# Patient Record
Sex: MALE | Race: WHITE | NOT HISPANIC OR LATINO | URBAN - METROPOLITAN AREA
[De-identification: names, ages, dates, MRNs, and addresses within clinical notes are randomized per-mention and may not be internally consistent; named-entity substitution may affect disease eponyms.]

---

## 2017-02-28 ENCOUNTER — EMERGENCY (EMERGENCY)
Facility: HOSPITAL | Age: 32
LOS: 1 days | Discharge: HOME | End: 2017-02-28
Attending: EMERGENCY MEDICINE
Payer: MEDICAID

## 2017-02-28 VITALS
DIASTOLIC BLOOD PRESSURE: 78 MMHG | TEMPERATURE: 98 F | WEIGHT: 139.99 LBS | OXYGEN SATURATION: 100 % | SYSTOLIC BLOOD PRESSURE: 151 MMHG | HEART RATE: 96 BPM | HEIGHT: 70 IN | RESPIRATION RATE: 22 BRPM

## 2017-02-28 DIAGNOSIS — R11.2 NAUSEA WITH VOMITING, UNSPECIFIED: ICD-10-CM

## 2017-02-28 DIAGNOSIS — F17.200 NICOTINE DEPENDENCE, UNSPECIFIED, UNCOMPLICATED: ICD-10-CM

## 2017-02-28 DIAGNOSIS — K29.20 ALCOHOLIC GASTRITIS WITHOUT BLEEDING: ICD-10-CM

## 2017-02-28 PROCEDURE — 99285 EMERGENCY DEPT VISIT HI MDM: CPT | Mod: 25

## 2017-02-28 NOTE — ED ADULT TRIAGE NOTE - CHIEF COMPLAINT QUOTE
Pt c/o vomiting , diarrhea x 3 hours. Pt has hx of pancreatitis and colitis.  Pt also reports sob.  On 12L NRB upon arrival.

## 2017-03-01 VITALS
RESPIRATION RATE: 16 BRPM | TEMPERATURE: 98 F | HEART RATE: 87 BPM | OXYGEN SATURATION: 98 % | DIASTOLIC BLOOD PRESSURE: 87 MMHG | SYSTOLIC BLOOD PRESSURE: 135 MMHG

## 2017-03-01 LAB
ALBUMIN SERPL ELPH-MCNC: 4.5 G/DL — SIGNIFICANT CHANGE UP (ref 3.3–5.2)
ALP SERPL-CCNC: 65 U/L — SIGNIFICANT CHANGE UP (ref 40–120)
ALT FLD-CCNC: 23 U/L — SIGNIFICANT CHANGE UP
ANION GAP SERPL CALC-SCNC: 14 MMOL/L — SIGNIFICANT CHANGE UP (ref 5–17)
AST SERPL-CCNC: 18 U/L — SIGNIFICANT CHANGE UP
BILIRUB SERPL-MCNC: 0.3 MG/DL — LOW (ref 0.4–2)
BUN SERPL-MCNC: 22 MG/DL — HIGH (ref 8–20)
CALCIUM SERPL-MCNC: 9.8 MG/DL — SIGNIFICANT CHANGE UP (ref 8.6–10.2)
CHLORIDE SERPL-SCNC: 101 MMOL/L — SIGNIFICANT CHANGE UP (ref 98–107)
CO2 SERPL-SCNC: 27 MMOL/L — SIGNIFICANT CHANGE UP (ref 22–29)
CREAT SERPL-MCNC: 1.02 MG/DL — SIGNIFICANT CHANGE UP (ref 0.5–1.3)
ETHANOL SERPL-MCNC: <10 MG/DL — SIGNIFICANT CHANGE UP
GLUCOSE SERPL-MCNC: 105 MG/DL — SIGNIFICANT CHANGE UP (ref 70–115)
HCT VFR BLD CALC: 42.2 % — SIGNIFICANT CHANGE UP (ref 42–52)
HGB BLD-MCNC: 15.3 G/DL — SIGNIFICANT CHANGE UP (ref 14–18)
LIDOCAIN IGE QN: 29 U/L — SIGNIFICANT CHANGE UP (ref 22–51)
MCHC RBC-ENTMCNC: 35.5 PG — HIGH (ref 27–31)
MCHC RBC-ENTMCNC: 36.3 G/DL — HIGH (ref 32–36)
MCV RBC AUTO: 97.9 FL — HIGH (ref 80–94)
PLATELET # BLD AUTO: 249 K/UL — SIGNIFICANT CHANGE UP (ref 150–400)
POTASSIUM SERPL-MCNC: 3.6 MMOL/L — SIGNIFICANT CHANGE UP (ref 3.5–5.3)
POTASSIUM SERPL-SCNC: 3.6 MMOL/L — SIGNIFICANT CHANGE UP (ref 3.5–5.3)
PROT SERPL-MCNC: 7.2 G/DL — SIGNIFICANT CHANGE UP (ref 6.6–8.7)
RBC # BLD: 4.31 M/UL — LOW (ref 4.6–6.2)
RBC # FLD: 12.1 % — SIGNIFICANT CHANGE UP (ref 11–15.6)
SODIUM SERPL-SCNC: 142 MMOL/L — SIGNIFICANT CHANGE UP (ref 135–145)
WBC # BLD: 7.2 K/UL — SIGNIFICANT CHANGE UP (ref 4.8–10.8)
WBC # FLD AUTO: 7.2 K/UL — SIGNIFICANT CHANGE UP (ref 4.8–10.8)

## 2017-03-01 PROCEDURE — 74177 CT ABD & PELVIS W/CONTRAST: CPT

## 2017-03-01 PROCEDURE — 74177 CT ABD & PELVIS W/CONTRAST: CPT | Mod: 26

## 2017-03-01 PROCEDURE — 99284 EMERGENCY DEPT VISIT MOD MDM: CPT | Mod: 25

## 2017-03-01 PROCEDURE — 85027 COMPLETE CBC AUTOMATED: CPT

## 2017-03-01 PROCEDURE — 96376 TX/PRO/DX INJ SAME DRUG ADON: CPT

## 2017-03-01 PROCEDURE — 80307 DRUG TEST PRSMV CHEM ANLYZR: CPT

## 2017-03-01 PROCEDURE — 80053 COMPREHEN METABOLIC PANEL: CPT

## 2017-03-01 PROCEDURE — 96375 TX/PRO/DX INJ NEW DRUG ADDON: CPT | Mod: XU

## 2017-03-01 PROCEDURE — 96374 THER/PROPH/DIAG INJ IV PUSH: CPT | Mod: XU

## 2017-03-01 PROCEDURE — 83690 ASSAY OF LIPASE: CPT

## 2017-03-01 RX ORDER — ONDANSETRON 8 MG/1
4 TABLET, FILM COATED ORAL ONCE
Qty: 0 | Refills: 0 | Status: COMPLETED | OUTPATIENT
Start: 2017-03-01 | End: 2017-03-01

## 2017-03-01 RX ORDER — SODIUM CHLORIDE 9 MG/ML
2000 INJECTION INTRAMUSCULAR; INTRAVENOUS; SUBCUTANEOUS ONCE
Qty: 0 | Refills: 0 | Status: COMPLETED | OUTPATIENT
Start: 2017-03-01 | End: 2017-03-01

## 2017-03-01 RX ORDER — DIPHENHYDRAMINE HCL 50 MG
25 CAPSULE ORAL ONCE
Qty: 0 | Refills: 0 | Status: COMPLETED | OUTPATIENT
Start: 2017-03-01 | End: 2017-03-01

## 2017-03-01 RX ORDER — HYDROMORPHONE HYDROCHLORIDE 2 MG/ML
1 INJECTION INTRAMUSCULAR; INTRAVENOUS; SUBCUTANEOUS ONCE
Qty: 0 | Refills: 0 | Status: DISCONTINUED | OUTPATIENT
Start: 2017-03-01 | End: 2017-03-01

## 2017-03-01 RX ORDER — FAMOTIDINE 10 MG/ML
20 INJECTION INTRAVENOUS ONCE
Qty: 0 | Refills: 0 | Status: COMPLETED | OUTPATIENT
Start: 2017-03-01 | End: 2017-03-01

## 2017-03-01 RX ORDER — KETOROLAC TROMETHAMINE 30 MG/ML
30 SYRINGE (ML) INJECTION ONCE
Qty: 0 | Refills: 0 | Status: DISCONTINUED | OUTPATIENT
Start: 2017-03-01 | End: 2017-03-01

## 2017-03-01 RX ORDER — METOCLOPRAMIDE HCL 10 MG
10 TABLET ORAL ONCE
Qty: 0 | Refills: 0 | Status: COMPLETED | OUTPATIENT
Start: 2017-03-01 | End: 2017-03-01

## 2017-03-01 RX ADMIN — Medication 30 MILLIGRAM(S): at 00:56

## 2017-03-01 RX ADMIN — SODIUM CHLORIDE 2000 MILLILITER(S): 9 INJECTION INTRAMUSCULAR; INTRAVENOUS; SUBCUTANEOUS at 00:41

## 2017-03-01 RX ADMIN — Medication 30 MILLIGRAM(S): at 00:42

## 2017-03-01 RX ADMIN — Medication 10 MILLIGRAM(S): at 00:54

## 2017-03-01 RX ADMIN — HYDROMORPHONE HYDROCHLORIDE 1 MILLIGRAM(S): 2 INJECTION INTRAMUSCULAR; INTRAVENOUS; SUBCUTANEOUS at 01:14

## 2017-03-01 RX ADMIN — HYDROMORPHONE HYDROCHLORIDE 1 MILLIGRAM(S): 2 INJECTION INTRAMUSCULAR; INTRAVENOUS; SUBCUTANEOUS at 00:56

## 2017-03-01 RX ADMIN — ONDANSETRON 4 MILLIGRAM(S): 8 TABLET, FILM COATED ORAL at 02:46

## 2017-03-01 RX ADMIN — HYDROMORPHONE HYDROCHLORIDE 1 MILLIGRAM(S): 2 INJECTION INTRAMUSCULAR; INTRAVENOUS; SUBCUTANEOUS at 03:11

## 2017-03-01 RX ADMIN — HYDROMORPHONE HYDROCHLORIDE 1 MILLIGRAM(S): 2 INJECTION INTRAMUSCULAR; INTRAVENOUS; SUBCUTANEOUS at 03:25

## 2017-03-01 RX ADMIN — HYDROMORPHONE HYDROCHLORIDE 1 MILLIGRAM(S): 2 INJECTION INTRAMUSCULAR; INTRAVENOUS; SUBCUTANEOUS at 00:41

## 2017-03-01 RX ADMIN — FAMOTIDINE 20 MILLIGRAM(S): 10 INJECTION INTRAVENOUS at 00:42

## 2017-03-01 RX ADMIN — Medication 25 MILLIGRAM(S): at 00:54

## 2017-03-01 RX ADMIN — HYDROMORPHONE HYDROCHLORIDE 1 MILLIGRAM(S): 2 INJECTION INTRAMUSCULAR; INTRAVENOUS; SUBCUTANEOUS at 01:30

## 2017-03-01 NOTE — ED PROVIDER NOTE - OBJECTIVE STATEMENT
32 y/o M pt with hx of pancreatitis and colitis presents to ED c/o abdominal pain, nausea, vomiting and diarrhea starting 2 days ago. Pt has recent endoscopy, with normal findings. last episode was in September.  Pt filled prescription for Zofran and Dilaudid last year. Smoker and THC use. Denies chest pain, SOB. No headache, dizziness, blurry vision. No blood in stool. No further complaints at this time.   GI: Jing

## 2017-03-01 NOTE — ED PROVIDER NOTE - MEDICAL DECISION MAKING DETAILS
labs, IV fluids, CT scan, Zofran, Dilaudid, Pepcid. no obvious signs of drug abuse. and re-evaluate pt

## 2017-03-01 NOTE — ED PROVIDER NOTE - NS ED MD SCRIBE ATTENDING SCRIBE SECTIONS
HISTORY OF PRESENT ILLNESS/VITAL SIGNS( Pullset)/PHYSICAL EXAM/HIV/REVIEW OF SYSTEMS/DISPOSITION/PAST MEDICAL/SURGICAL/SOCIAL HISTORY

## 2017-03-01 NOTE — ED ADULT NURSE NOTE - OBJECTIVE STATEMENT
Patient presents to ED with reports of abdominal pain, na Patient presents to ED with reports of abdominal pain, nausea and vomiting nonstop for 3 hours. As per patient he has a history of pancreatitis and colitis. Patient states that he was just having dinner when he suddenly felt above mentioned symptoms. Patient states because of his colitis, he has episodes of diarrhea. Patient added he has experienced SOB. He can't tolerate pain any longer and presents to bedside tearful.

## 2017-03-01 NOTE — ED PROVIDER NOTE - PROGRESS NOTE DETAILS
pt's labs and CT reviewed  no evidence of emergent pathology  possible alcoholic gastritis  will d/c after one more round of pain meds - follow up with GI and pmd  advised to stop ETOH

## 2017-07-22 ENCOUNTER — EMERGENCY (EMERGENCY)
Facility: HOSPITAL | Age: 32
LOS: 1 days | Discharge: HOME | End: 2017-07-22
Attending: EMERGENCY MEDICINE | Admitting: EMERGENCY MEDICINE
Payer: SELF-PAY

## 2017-07-22 VITALS
SYSTOLIC BLOOD PRESSURE: 124 MMHG | OXYGEN SATURATION: 99 % | HEART RATE: 78 BPM | RESPIRATION RATE: 18 BRPM | DIASTOLIC BLOOD PRESSURE: 78 MMHG | TEMPERATURE: 98 F

## 2017-07-22 VITALS
SYSTOLIC BLOOD PRESSURE: 121 MMHG | RESPIRATION RATE: 18 BRPM | HEART RATE: 89 BPM | WEIGHT: 134.92 LBS | OXYGEN SATURATION: 99 % | TEMPERATURE: 99 F | DIASTOLIC BLOOD PRESSURE: 70 MMHG | HEIGHT: 70 IN

## 2017-07-22 LAB
ALBUMIN SERPL ELPH-MCNC: 5 G/DL — SIGNIFICANT CHANGE UP (ref 3.3–5.2)
ALP SERPL-CCNC: 72 U/L — SIGNIFICANT CHANGE UP (ref 40–120)
ALT FLD-CCNC: 24 U/L — SIGNIFICANT CHANGE UP
ANION GAP SERPL CALC-SCNC: 15 MMOL/L — SIGNIFICANT CHANGE UP (ref 5–17)
APPEARANCE UR: CLEAR — SIGNIFICANT CHANGE UP
AST SERPL-CCNC: 21 U/L — SIGNIFICANT CHANGE UP
BACTERIA # UR AUTO: NEGATIVE — SIGNIFICANT CHANGE UP
BASOPHILS NFR BLD AUTO: 1 % — SIGNIFICANT CHANGE UP (ref 0–2)
BILIRUB SERPL-MCNC: 0.3 MG/DL — LOW (ref 0.4–2)
BILIRUB UR-MCNC: NEGATIVE — SIGNIFICANT CHANGE UP
BLD GP AB SCN SERPL QL: SIGNIFICANT CHANGE UP
BUN SERPL-MCNC: 15 MG/DL — SIGNIFICANT CHANGE UP (ref 8–20)
CALCIUM SERPL-MCNC: 9.3 MG/DL — SIGNIFICANT CHANGE UP (ref 8.6–10.2)
CHLORIDE SERPL-SCNC: 104 MMOL/L — SIGNIFICANT CHANGE UP (ref 98–107)
CO2 SERPL-SCNC: 23 MMOL/L — SIGNIFICANT CHANGE UP (ref 22–29)
COLOR SPEC: YELLOW — SIGNIFICANT CHANGE UP
CREAT SERPL-MCNC: 0.6 MG/DL — SIGNIFICANT CHANGE UP (ref 0.5–1.3)
DIFF PNL FLD: NEGATIVE — SIGNIFICANT CHANGE UP
EPI CELLS # UR: SIGNIFICANT CHANGE UP
GLUCOSE SERPL-MCNC: 116 MG/DL — HIGH (ref 70–115)
GLUCOSE UR QL: NEGATIVE MG/DL — SIGNIFICANT CHANGE UP
HCT VFR BLD CALC: 41.1 % — LOW (ref 42–52)
HGB BLD-MCNC: 14.7 G/DL — SIGNIFICANT CHANGE UP (ref 14–18)
KETONES UR-MCNC: ABNORMAL
LEUKOCYTE ESTERASE UR-ACNC: NEGATIVE — SIGNIFICANT CHANGE UP
LIDOCAIN IGE QN: 65 U/L — HIGH (ref 22–51)
LYMPHOCYTES # BLD AUTO: 7 % — LOW (ref 20–55)
MCHC RBC-ENTMCNC: 34.4 PG — HIGH (ref 27–31)
MCHC RBC-ENTMCNC: 35.8 G/DL — SIGNIFICANT CHANGE UP (ref 32–36)
MCV RBC AUTO: 96.3 FL — HIGH (ref 80–94)
NEUTROPHILS NFR BLD AUTO: 91 % — HIGH (ref 37–73)
NITRITE UR-MCNC: NEGATIVE — SIGNIFICANT CHANGE UP
PH UR: 6.5 — SIGNIFICANT CHANGE UP (ref 5–8)
PLAT MORPH BLD: NORMAL — SIGNIFICANT CHANGE UP
PLATELET # BLD AUTO: 227 K/UL — SIGNIFICANT CHANGE UP (ref 150–400)
POTASSIUM SERPL-MCNC: 3.9 MMOL/L — SIGNIFICANT CHANGE UP (ref 3.5–5.3)
POTASSIUM SERPL-SCNC: 3.9 MMOL/L — SIGNIFICANT CHANGE UP (ref 3.5–5.3)
PROT SERPL-MCNC: 7.1 G/DL — SIGNIFICANT CHANGE UP (ref 6.6–8.7)
PROT UR-MCNC: NEGATIVE MG/DL — SIGNIFICANT CHANGE UP
RBC # BLD: 4.27 M/UL — LOW (ref 4.6–6.2)
RBC # FLD: 12.5 % — SIGNIFICANT CHANGE UP (ref 11–15.6)
RBC BLD AUTO: NORMAL — SIGNIFICANT CHANGE UP
RBC CASTS # UR COMP ASSIST: SIGNIFICANT CHANGE UP /HPF (ref 0–4)
SODIUM SERPL-SCNC: 142 MMOL/L — SIGNIFICANT CHANGE UP (ref 135–145)
SP GR SPEC: 1.01 — SIGNIFICANT CHANGE UP (ref 1.01–1.02)
TYPE + AB SCN PNL BLD: SIGNIFICANT CHANGE UP
UROBILINOGEN FLD QL: NEGATIVE MG/DL — SIGNIFICANT CHANGE UP
VARIANT LYMPHS # BLD: 1 % — SIGNIFICANT CHANGE UP (ref 0–6)
WBC # BLD: 8 K/UL — SIGNIFICANT CHANGE UP (ref 4.8–10.8)
WBC # FLD AUTO: 8 K/UL — SIGNIFICANT CHANGE UP (ref 4.8–10.8)
WBC UR QL: SIGNIFICANT CHANGE UP

## 2017-07-22 PROCEDURE — 86850 RBC ANTIBODY SCREEN: CPT

## 2017-07-22 PROCEDURE — 96375 TX/PRO/DX INJ NEW DRUG ADDON: CPT

## 2017-07-22 PROCEDURE — 99284 EMERGENCY DEPT VISIT MOD MDM: CPT | Mod: 25

## 2017-07-22 PROCEDURE — 99284 EMERGENCY DEPT VISIT MOD MDM: CPT

## 2017-07-22 PROCEDURE — 81001 URINALYSIS AUTO W/SCOPE: CPT

## 2017-07-22 PROCEDURE — 74177 CT ABD & PELVIS W/CONTRAST: CPT

## 2017-07-22 PROCEDURE — 96374 THER/PROPH/DIAG INJ IV PUSH: CPT | Mod: XU

## 2017-07-22 PROCEDURE — 80053 COMPREHEN METABOLIC PANEL: CPT

## 2017-07-22 PROCEDURE — 85027 COMPLETE CBC AUTOMATED: CPT

## 2017-07-22 PROCEDURE — 86901 BLOOD TYPING SEROLOGIC RH(D): CPT

## 2017-07-22 PROCEDURE — 83690 ASSAY OF LIPASE: CPT

## 2017-07-22 PROCEDURE — 36415 COLL VENOUS BLD VENIPUNCTURE: CPT

## 2017-07-22 PROCEDURE — 86900 BLOOD TYPING SEROLOGIC ABO: CPT

## 2017-07-22 PROCEDURE — 74177 CT ABD & PELVIS W/CONTRAST: CPT | Mod: 26

## 2017-07-22 RX ORDER — SODIUM CHLORIDE 9 MG/ML
3 INJECTION INTRAMUSCULAR; INTRAVENOUS; SUBCUTANEOUS ONCE
Qty: 0 | Refills: 0 | Status: COMPLETED | OUTPATIENT
Start: 2017-07-22 | End: 2017-07-22

## 2017-07-22 RX ORDER — ONDANSETRON 8 MG/1
8 TABLET, FILM COATED ORAL ONCE
Qty: 0 | Refills: 0 | Status: COMPLETED | OUTPATIENT
Start: 2017-07-22 | End: 2017-07-22

## 2017-07-22 RX ORDER — MORPHINE SULFATE 50 MG/1
4 CAPSULE, EXTENDED RELEASE ORAL ONCE
Qty: 0 | Refills: 0 | Status: DISCONTINUED | OUTPATIENT
Start: 2017-07-22 | End: 2017-07-22

## 2017-07-22 RX ADMIN — SODIUM CHLORIDE 3 MILLILITER(S): 9 INJECTION INTRAMUSCULAR; INTRAVENOUS; SUBCUTANEOUS at 12:34

## 2017-07-22 RX ADMIN — ONDANSETRON 8 MILLIGRAM(S): 8 TABLET, FILM COATED ORAL at 12:56

## 2017-07-22 RX ADMIN — MORPHINE SULFATE 4 MILLIGRAM(S): 50 CAPSULE, EXTENDED RELEASE ORAL at 12:56

## 2017-07-22 RX ADMIN — MORPHINE SULFATE 4 MILLIGRAM(S): 50 CAPSULE, EXTENDED RELEASE ORAL at 13:11

## 2017-07-22 NOTE — ED ADULT NURSE NOTE - OBJECTIVE STATEMENT
pt BIBA for the ED for complaining of left sided abdominal pain with radiation to back.  pt is A/Ox3.

## 2017-07-22 NOTE — ED PROVIDER NOTE - MEDICAL DECISION MAKING DETAILS
The patient has history of ulcer and had alcohol last night.  Lab/CT negative.  The patient appears well and feels better.  Will DC home and f/u with PMD

## 2017-07-22 NOTE — ED PROVIDER NOTE - PROGRESS NOTE DETAILS
Patient given Zofran 8mg and Morphine 4mg and has improved symptomatically. Now is laying more comfortably in bed, only slightly tender to palpation on LLQ but still has L flank tenderness The patient appears well and feels better. Lab/CT negative and will DC home and f/u with PMD

## 2017-07-22 NOTE — ED PROVIDER NOTE - OBJECTIVE STATEMENT
Patient is a 33 y/o M with PMH of alcoholic pancreatitis, gastric ulcers and colitis how presents with multiple episodes of vomiting which started at 2am last night. He had 2 beers and coffee last night. He states he has vomited every 15 mins since they started and is experiencing L sided abdominal pain. The vomit started out yellow in color and in large amounts then small volumes with small specs of red but no carli red blood. He states the pain was 10/10, sharp and colicky, radiates to his L groin and back and only relieved by pain meds and keeping his hips flexed reduces the pain. He states he takes ibuprofen everyday even though he was told by his GI not to. He states he has chronic diarrhea with dark black loose stools daily. He endorses painful and concentrated urination and fatigue. He denies any CP, SOB, H/A, or fever. Patient is a 31 y/o M with PMH of alcoholic pancreatitis, gastric ulcers and colitis presents with multiple episodes of vomiting which started at 2am last night. He had 2 beers and coffee last night. He states he has vomited every 15 mins since they started and is experiencing L sided abdominal pain. The vomit started out yellow in color and in large amounts then small volumes with small specs of red but no carli red blood. He states the pain was 10/10, sharp and colicky, radiates to his L groin and back and only relieved by pain meds and keeping his hips flexed reduces the pain. He states he takes ibuprofen everyday even though he was told by his GI not to. He states he has chronic diarrhea with dark black loose stools daily. He endorses painful and concentrated urination and fatigue. He denies any CP, SOB, H/A, or fever.

## 2018-03-05 ENCOUNTER — EMERGENCY (EMERGENCY)
Facility: HOSPITAL | Age: 33
LOS: 1 days | End: 2018-03-05
Payer: MEDICAID

## 2018-03-05 PROCEDURE — 99284 EMERGENCY DEPT VISIT MOD MDM: CPT

## 2018-08-08 NOTE — ED PROVIDER NOTE - CROS ED RESP ALL NEG
Problem: Risk for Impaired Skin Integrity  Goal: Tissue integrity - skin and mucous membranes  Structural intactness and normal physiological function of skin and  mucous membranes.      Intervention: PRESSURE ULCER PREVENTION  Pt turned to side frequently using pillow wedge, pillow under heels/elbows negative...

## 2019-02-06 ENCOUNTER — EMERGENCY (EMERGENCY)
Facility: HOSPITAL | Age: 34
LOS: 1 days | Discharge: HOME | End: 2019-02-06
Attending: EMERGENCY MEDICINE
Payer: MEDICAID

## 2019-02-06 VITALS
TEMPERATURE: 98 F | SYSTOLIC BLOOD PRESSURE: 126 MMHG | RESPIRATION RATE: 16 BRPM | WEIGHT: 175.05 LBS | OXYGEN SATURATION: 98 % | HEART RATE: 109 BPM | HEIGHT: 71 IN | DIASTOLIC BLOOD PRESSURE: 80 MMHG

## 2019-02-06 VITALS — HEART RATE: 88 BPM

## 2019-02-06 PROBLEM — K25.9 GASTRIC ULCER, UNSPECIFIED AS ACUTE OR CHRONIC, WITHOUT HEMORRHAGE OR PERFORATION: Chronic | Status: ACTIVE | Noted: 2017-07-22

## 2019-02-06 PROBLEM — K52.9 NONINFECTIVE GASTROENTERITIS AND COLITIS, UNSPECIFIED: Chronic | Status: ACTIVE | Noted: 2017-03-01

## 2019-02-06 PROBLEM — K29.70 GASTRITIS, UNSPECIFIED, WITHOUT BLEEDING: Chronic | Status: ACTIVE | Noted: 2017-07-22

## 2019-02-06 LAB
ALBUMIN SERPL ELPH-MCNC: 4.6 G/DL — SIGNIFICANT CHANGE UP (ref 3.3–5.2)
ALP SERPL-CCNC: 97 U/L — SIGNIFICANT CHANGE UP (ref 40–120)
ALT FLD-CCNC: 39 U/L — SIGNIFICANT CHANGE UP
ANION GAP SERPL CALC-SCNC: 11 MMOL/L — SIGNIFICANT CHANGE UP (ref 5–17)
APPEARANCE UR: CLEAR — SIGNIFICANT CHANGE UP
AST SERPL-CCNC: 28 U/L — SIGNIFICANT CHANGE UP
BILIRUB SERPL-MCNC: 0.6 MG/DL — SIGNIFICANT CHANGE UP (ref 0.4–2)
BILIRUB UR-MCNC: NEGATIVE — SIGNIFICANT CHANGE UP
BUN SERPL-MCNC: 22 MG/DL — HIGH (ref 8–20)
CALCIUM SERPL-MCNC: 9.4 MG/DL — SIGNIFICANT CHANGE UP (ref 8.6–10.2)
CHLORIDE SERPL-SCNC: 102 MMOL/L — SIGNIFICANT CHANGE UP (ref 98–107)
CO2 SERPL-SCNC: 28 MMOL/L — SIGNIFICANT CHANGE UP (ref 22–29)
COLOR SPEC: YELLOW — SIGNIFICANT CHANGE UP
CREAT SERPL-MCNC: 0.8 MG/DL — SIGNIFICANT CHANGE UP (ref 0.5–1.3)
DIFF PNL FLD: NEGATIVE — SIGNIFICANT CHANGE UP
EOSINOPHIL NFR BLD AUTO: 1 % — SIGNIFICANT CHANGE UP (ref 0–6)
GLUCOSE SERPL-MCNC: 107 MG/DL — SIGNIFICANT CHANGE UP (ref 70–115)
GLUCOSE UR QL: NEGATIVE MG/DL — SIGNIFICANT CHANGE UP
HCT VFR BLD CALC: 45.5 % — SIGNIFICANT CHANGE UP (ref 42–52)
HGB BLD-MCNC: 16.2 G/DL — SIGNIFICANT CHANGE UP (ref 14–18)
KETONES UR-MCNC: NEGATIVE — SIGNIFICANT CHANGE UP
LEUKOCYTE ESTERASE UR-ACNC: NEGATIVE — SIGNIFICANT CHANGE UP
LIDOCAIN IGE QN: 28 U/L — SIGNIFICANT CHANGE UP (ref 22–51)
LYMPHOCYTES # BLD AUTO: 4 % — LOW (ref 20–55)
MCHC RBC-ENTMCNC: 34.2 PG — HIGH (ref 27–31)
MCHC RBC-ENTMCNC: 35.6 G/DL — SIGNIFICANT CHANGE UP (ref 32–36)
MCV RBC AUTO: 96 FL — HIGH (ref 80–94)
MONOCYTES NFR BLD AUTO: 2 % — LOW (ref 3–10)
NEUTROPHILS NFR BLD AUTO: 90 % — HIGH (ref 37–73)
NEUTS BAND # BLD: 3 % — SIGNIFICANT CHANGE UP (ref 0–8)
NITRITE UR-MCNC: NEGATIVE — SIGNIFICANT CHANGE UP
PH UR: 6 — SIGNIFICANT CHANGE UP (ref 5–8)
PLAT MORPH BLD: NORMAL — SIGNIFICANT CHANGE UP
PLATELET # BLD AUTO: 201 K/UL — SIGNIFICANT CHANGE UP (ref 150–400)
POTASSIUM SERPL-MCNC: 4.3 MMOL/L — SIGNIFICANT CHANGE UP (ref 3.5–5.3)
POTASSIUM SERPL-SCNC: 4.3 MMOL/L — SIGNIFICANT CHANGE UP (ref 3.5–5.3)
PROT SERPL-MCNC: 7.3 G/DL — SIGNIFICANT CHANGE UP (ref 6.6–8.7)
PROT UR-MCNC: NEGATIVE MG/DL — SIGNIFICANT CHANGE UP
RBC # BLD: 4.74 M/UL — SIGNIFICANT CHANGE UP (ref 4.6–6.2)
RBC # FLD: 12.5 % — SIGNIFICANT CHANGE UP (ref 11–15.6)
RBC BLD AUTO: NORMAL — SIGNIFICANT CHANGE UP
SODIUM SERPL-SCNC: 141 MMOL/L — SIGNIFICANT CHANGE UP (ref 135–145)
SP GR SPEC: 1.01 — SIGNIFICANT CHANGE UP (ref 1.01–1.02)
UROBILINOGEN FLD QL: NEGATIVE MG/DL — SIGNIFICANT CHANGE UP
WBC # BLD: 12.1 K/UL — HIGH (ref 4.8–10.8)
WBC # FLD AUTO: 12.1 K/UL — HIGH (ref 4.8–10.8)

## 2019-02-06 PROCEDURE — 96375 TX/PRO/DX INJ NEW DRUG ADDON: CPT

## 2019-02-06 PROCEDURE — 81003 URINALYSIS AUTO W/O SCOPE: CPT

## 2019-02-06 PROCEDURE — 96374 THER/PROPH/DIAG INJ IV PUSH: CPT

## 2019-02-06 PROCEDURE — 85027 COMPLETE CBC AUTOMATED: CPT

## 2019-02-06 PROCEDURE — 99284 EMERGENCY DEPT VISIT MOD MDM: CPT | Mod: 25

## 2019-02-06 PROCEDURE — 96361 HYDRATE IV INFUSION ADD-ON: CPT

## 2019-02-06 PROCEDURE — 36415 COLL VENOUS BLD VENIPUNCTURE: CPT

## 2019-02-06 PROCEDURE — 83690 ASSAY OF LIPASE: CPT

## 2019-02-06 PROCEDURE — 99284 EMERGENCY DEPT VISIT MOD MDM: CPT

## 2019-02-06 PROCEDURE — 80053 COMPREHEN METABOLIC PANEL: CPT

## 2019-02-06 RX ORDER — KETOROLAC TROMETHAMINE 30 MG/ML
15 SYRINGE (ML) INJECTION ONCE
Qty: 0 | Refills: 0 | Status: DISCONTINUED | OUTPATIENT
Start: 2019-02-06 | End: 2019-02-06

## 2019-02-06 RX ORDER — ONDANSETRON 8 MG/1
4 TABLET, FILM COATED ORAL ONCE
Qty: 0 | Refills: 0 | Status: COMPLETED | OUTPATIENT
Start: 2019-02-06 | End: 2019-02-06

## 2019-02-06 RX ORDER — METOCLOPRAMIDE HCL 10 MG
10 TABLET ORAL ONCE
Qty: 0 | Refills: 0 | Status: COMPLETED | OUTPATIENT
Start: 2019-02-06 | End: 2019-02-06

## 2019-02-06 RX ORDER — SODIUM CHLORIDE 9 MG/ML
3 INJECTION INTRAMUSCULAR; INTRAVENOUS; SUBCUTANEOUS ONCE
Qty: 0 | Refills: 0 | Status: COMPLETED | OUTPATIENT
Start: 2019-02-06 | End: 2019-02-06

## 2019-02-06 RX ORDER — FAMOTIDINE 10 MG/ML
20 INJECTION INTRAVENOUS ONCE
Qty: 0 | Refills: 0 | Status: COMPLETED | OUTPATIENT
Start: 2019-02-06 | End: 2019-02-06

## 2019-02-06 RX ORDER — METOCLOPRAMIDE HCL 10 MG
1 TABLET ORAL
Qty: 15 | Refills: 0 | OUTPATIENT
Start: 2019-02-06 | End: 2019-02-10

## 2019-02-06 RX ORDER — SODIUM CHLORIDE 9 MG/ML
1000 INJECTION INTRAMUSCULAR; INTRAVENOUS; SUBCUTANEOUS ONCE
Qty: 0 | Refills: 0 | Status: COMPLETED | OUTPATIENT
Start: 2019-02-06 | End: 2019-02-06

## 2019-02-06 RX ADMIN — SODIUM CHLORIDE 3 MILLILITER(S): 9 INJECTION INTRAMUSCULAR; INTRAVENOUS; SUBCUTANEOUS at 10:44

## 2019-02-06 RX ADMIN — SODIUM CHLORIDE 1000 MILLILITER(S): 9 INJECTION INTRAMUSCULAR; INTRAVENOUS; SUBCUTANEOUS at 10:44

## 2019-02-06 RX ADMIN — SODIUM CHLORIDE 1000 MILLILITER(S): 9 INJECTION INTRAMUSCULAR; INTRAVENOUS; SUBCUTANEOUS at 13:07

## 2019-02-06 RX ADMIN — ONDANSETRON 4 MILLIGRAM(S): 8 TABLET, FILM COATED ORAL at 10:44

## 2019-02-06 RX ADMIN — Medication 10 MILLIGRAM(S): at 13:07

## 2019-02-06 RX ADMIN — Medication 15 MILLIGRAM(S): at 13:07

## 2019-02-06 RX ADMIN — Medication 15 MILLIGRAM(S): at 10:54

## 2019-02-06 RX ADMIN — FAMOTIDINE 20 MILLIGRAM(S): 10 INJECTION INTRAVENOUS at 10:54

## 2019-02-06 NOTE — ED STATDOCS - PMH
Colitis    Colitis    Gastritis    Pancreatitis    Pancreatitis    Stomach ulcer Colitis    Gastritis    Pancreatitis    Stomach ulcer

## 2019-02-06 NOTE — ED ADULT NURSE NOTE - OBJECTIVE STATEMENT
Patient care assumed 1025. Patient received AOx3 sitting comfortably in bed with girlfriend at bedside. No distress noted at this time. Patient states abdominal pain, N/V since 0500 this AM. Patient states he has hx of pancreatitis, but thinks "this might be from bad food I ate last night." pt educated on plan of care, pt able to successfully teach back plan of care to RN, RN will continue to reeducate pt during hospital stay.

## 2019-02-06 NOTE — ED STATDOCS - PSH
No significant past surgical history    No significant past surgical history No significant past surgical history

## 2019-02-06 NOTE — ED STATDOCS - PROGRESS NOTE DETAILS
NP NOTE:  HPI, ROS, PE of intake doctor reviewed.  Abd exam + bs x 4, soft, nttp.  No vomiting while in ED, tolerating po.  Labs reviewed.  Will rx reglan as patient states ondansetron is too expensive and he cannot get it.  Refer to GI.

## 2019-02-06 NOTE — ED STATDOCS - ATTENDING CONTRIBUTION TO CARE
I, Karl Gilbert, performed the initial face to face bedside interview with this patient regarding history of present illness, review of symptoms and relevant past medical, social and family history.  I completed an independent physical examination.  I was the initial provider who evaluated this patient. I have signed out the follow up of any pending tests (i.e. labs, radiological studies) to the ACP.  I have communicated the patient’s plan of care and disposition with the ACP.

## 2019-02-06 NOTE — ED STATDOCS - MEDICAL DECISION MAKING DETAILS
Patient with prior history of alcohol pancreatiti having epigastric and LUQ pain since this morning. Will check blood and UA; will give IV fluid for hydration, Toradol for pain, Pepcid, and Zofran for nausea Patient with prior history of alcohol pancreatitis having epigastric and LUQ pain since this morning. Will check blood and UA; will give IV fluid for hydration, Toradol for pain, Pepcid, and Zofran for nausea

## 2019-02-06 NOTE — ED ADULT TRIAGE NOTE - CHIEF COMPLAINT QUOTE
Pt ambulatory in ED c/o abdominal pain and vomiting since approx 0500 this morning. Reports hx of pancreatitis and states " I may have eaten something that didn't agree with me."

## 2019-02-06 NOTE — ED STATDOCS - PHYSICAL EXAMINATION
epiga and luq pain  uncomfortable looking  lungs heart fine  diffuse erythema of skin (pt states he had recent sunburn_

## 2019-02-06 NOTE — ED STATDOCS - OBJECTIVE STATEMENT
34 y/o M with PMHx of pancreatitis presents to the ED c/o abdominal pain and vomiting since 0500 this morning. Patient reports associated symptoms of diaphoresis and feeling cold; he states regurgitating this morning but no food is excreted. Patient notes that symptoms are similar to pancreatitis symptoms in the past. He states that he has been clear of EtOH and tobacco use for 1 year. No further complaints at this time.

## 2019-06-22 NOTE — ED PROVIDER NOTE - ATTENDING CONTRIBUTION TO CARE
No I, Polo Donaldson, performed the initial face to face bedside interview with this patient regarding history of present illness, review of symptoms and relevant past medical, social and family history.  I completed an independent physical examination.  I was the initial provider who evaluated this patient. I have signed out the follow up of any pending tests (i.e. labs, radiological studies) to the resident.  I have communicated the patient’s plan of care and disposition with the resident.

## 2019-07-27 ENCOUNTER — EMERGENCY (EMERGENCY)
Facility: HOSPITAL | Age: 34
LOS: 1 days | Discharge: HOME | End: 2019-07-27
Attending: EMERGENCY MEDICINE
Payer: MEDICAID

## 2019-07-27 VITALS
SYSTOLIC BLOOD PRESSURE: 148 MMHG | DIASTOLIC BLOOD PRESSURE: 75 MMHG | HEART RATE: 68 BPM | TEMPERATURE: 98 F | WEIGHT: 145.06 LBS | HEIGHT: 70 IN | RESPIRATION RATE: 18 BRPM | OXYGEN SATURATION: 99 %

## 2019-07-27 VITALS
RESPIRATION RATE: 18 BRPM | DIASTOLIC BLOOD PRESSURE: 61 MMHG | SYSTOLIC BLOOD PRESSURE: 104 MMHG | TEMPERATURE: 98 F | OXYGEN SATURATION: 97 % | HEART RATE: 51 BPM

## 2019-07-27 LAB
ALBUMIN SERPL ELPH-MCNC: 4.9 G/DL — SIGNIFICANT CHANGE UP (ref 3.3–5.2)
ALP SERPL-CCNC: 87 U/L — SIGNIFICANT CHANGE UP (ref 40–120)
ALT FLD-CCNC: 16 U/L — SIGNIFICANT CHANGE UP
ANION GAP SERPL CALC-SCNC: 12 MMOL/L — SIGNIFICANT CHANGE UP (ref 5–17)
APTT BLD: 28.6 SEC — SIGNIFICANT CHANGE UP (ref 27.5–36.3)
AST SERPL-CCNC: 18 U/L — SIGNIFICANT CHANGE UP
BASE EXCESS BLDV CALC-SCNC: 2.1 MMOL/L — HIGH (ref -2–2)
BASOPHILS # BLD AUTO: 0.06 K/UL — SIGNIFICANT CHANGE UP (ref 0–0.2)
BASOPHILS NFR BLD AUTO: 0.4 % — SIGNIFICANT CHANGE UP (ref 0–2)
BILIRUB SERPL-MCNC: 0.2 MG/DL — LOW (ref 0.4–2)
BUN SERPL-MCNC: 23 MG/DL — HIGH (ref 8–20)
CA-I SERPL-SCNC: 1.16 MMOL/L — SIGNIFICANT CHANGE UP (ref 1.15–1.33)
CALCIUM SERPL-MCNC: 9.8 MG/DL — SIGNIFICANT CHANGE UP (ref 8.6–10.2)
CHLORIDE BLDV-SCNC: 103 MMOL/L — SIGNIFICANT CHANGE UP (ref 98–107)
CHLORIDE SERPL-SCNC: 102 MMOL/L — SIGNIFICANT CHANGE UP (ref 98–107)
CO2 SERPL-SCNC: 26 MMOL/L — SIGNIFICANT CHANGE UP (ref 22–29)
CREAT SERPL-MCNC: 0.72 MG/DL — SIGNIFICANT CHANGE UP (ref 0.5–1.3)
EOSINOPHIL # BLD AUTO: 0.23 K/UL — SIGNIFICANT CHANGE UP (ref 0–0.5)
EOSINOPHIL NFR BLD AUTO: 1.7 % — SIGNIFICANT CHANGE UP (ref 0–6)
GAS PNL BLDV: 143 MMOL/L — SIGNIFICANT CHANGE UP (ref 135–145)
GAS PNL BLDV: SIGNIFICANT CHANGE UP
GAS PNL BLDV: SIGNIFICANT CHANGE UP
GLUCOSE BLDV-MCNC: 137 MG/DL — HIGH (ref 70–99)
GLUCOSE SERPL-MCNC: 142 MG/DL — HIGH (ref 70–115)
HCO3 BLDV-SCNC: 26 MMOL/L — SIGNIFICANT CHANGE UP (ref 20–26)
HCT VFR BLD CALC: 41.4 % — SIGNIFICANT CHANGE UP (ref 39–50)
HCT VFR BLDA CALC: 48 — SIGNIFICANT CHANGE UP (ref 39–50)
HGB BLD CALC-MCNC: 15.7 G/DL — SIGNIFICANT CHANGE UP (ref 13–17)
HGB BLD-MCNC: 15 G/DL — SIGNIFICANT CHANGE UP (ref 13–17)
IMM GRANULOCYTES NFR BLD AUTO: 0.9 % — SIGNIFICANT CHANGE UP (ref 0–1.5)
INR BLD: 0.95 RATIO — SIGNIFICANT CHANGE UP (ref 0.88–1.16)
LACTATE BLDV-MCNC: 2 MMOL/L — SIGNIFICANT CHANGE UP (ref 0.5–2)
LIDOCAIN IGE QN: 83 U/L — HIGH (ref 22–51)
LYMPHOCYTES # BLD AUTO: 1.98 K/UL — SIGNIFICANT CHANGE UP (ref 1–3.3)
LYMPHOCYTES # BLD AUTO: 14.2 % — SIGNIFICANT CHANGE UP (ref 13–44)
MAGNESIUM SERPL-MCNC: 2 MG/DL — SIGNIFICANT CHANGE UP (ref 1.6–2.6)
MCHC RBC-ENTMCNC: 35.2 PG — HIGH (ref 27–34)
MCHC RBC-ENTMCNC: 36.2 GM/DL — HIGH (ref 32–36)
MCV RBC AUTO: 97.2 FL — SIGNIFICANT CHANGE UP (ref 80–100)
MONOCYTES # BLD AUTO: 0.71 K/UL — SIGNIFICANT CHANGE UP (ref 0–0.9)
MONOCYTES NFR BLD AUTO: 5.1 % — SIGNIFICANT CHANGE UP (ref 2–14)
NEUTROPHILS # BLD AUTO: 10.82 K/UL — HIGH (ref 1.8–7.4)
NEUTROPHILS NFR BLD AUTO: 77.7 % — HIGH (ref 43–77)
OTHER CELLS CSF MANUAL: 18 ML/DL — SIGNIFICANT CHANGE UP (ref 18–22)
PCO2 BLDV: 51 MMHG — HIGH (ref 35–50)
PH BLDV: 7.36 — SIGNIFICANT CHANGE UP (ref 7.32–7.43)
PHOSPHATE SERPL-MCNC: 1.4 MG/DL — LOW (ref 2.4–4.7)
PLATELET # BLD AUTO: 269 K/UL — SIGNIFICANT CHANGE UP (ref 150–400)
PO2 BLDV: 50 MMHG — HIGH (ref 25–45)
POTASSIUM BLDV-SCNC: 4.1 MMOL/L — SIGNIFICANT CHANGE UP (ref 3.4–4.5)
POTASSIUM SERPL-MCNC: 4.1 MMOL/L — SIGNIFICANT CHANGE UP (ref 3.5–5.3)
POTASSIUM SERPL-SCNC: 4.1 MMOL/L — SIGNIFICANT CHANGE UP (ref 3.5–5.3)
PROT SERPL-MCNC: 7 G/DL — SIGNIFICANT CHANGE UP (ref 6.6–8.7)
PROTHROM AB SERPL-ACNC: 10.9 SEC — SIGNIFICANT CHANGE UP (ref 10–12.9)
RBC # BLD: 4.26 M/UL — SIGNIFICANT CHANGE UP (ref 4.2–5.8)
RBC # FLD: 11.7 % — SIGNIFICANT CHANGE UP (ref 10.3–14.5)
SAO2 % BLDV: 84 % — SIGNIFICANT CHANGE UP
SODIUM SERPL-SCNC: 140 MMOL/L — SIGNIFICANT CHANGE UP (ref 135–145)
WBC # BLD: 13.93 K/UL — HIGH (ref 3.8–10.5)
WBC # FLD AUTO: 13.93 K/UL — HIGH (ref 3.8–10.5)

## 2019-07-27 PROCEDURE — 85014 HEMATOCRIT: CPT

## 2019-07-27 PROCEDURE — 96374 THER/PROPH/DIAG INJ IV PUSH: CPT | Mod: XU

## 2019-07-27 PROCEDURE — 82435 ASSAY OF BLOOD CHLORIDE: CPT

## 2019-07-27 PROCEDURE — 93010 ELECTROCARDIOGRAM REPORT: CPT

## 2019-07-27 PROCEDURE — 71045 X-RAY EXAM CHEST 1 VIEW: CPT

## 2019-07-27 PROCEDURE — 85027 COMPLETE CBC AUTOMATED: CPT

## 2019-07-27 PROCEDURE — 96376 TX/PRO/DX INJ SAME DRUG ADON: CPT

## 2019-07-27 PROCEDURE — 96372 THER/PROPH/DIAG INJ SC/IM: CPT | Mod: XU

## 2019-07-27 PROCEDURE — 82330 ASSAY OF CALCIUM: CPT

## 2019-07-27 PROCEDURE — 82803 BLOOD GASES ANY COMBINATION: CPT

## 2019-07-27 PROCEDURE — 74177 CT ABD & PELVIS W/CONTRAST: CPT

## 2019-07-27 PROCEDURE — 74177 CT ABD & PELVIS W/CONTRAST: CPT | Mod: 26

## 2019-07-27 PROCEDURE — 80053 COMPREHEN METABOLIC PANEL: CPT

## 2019-07-27 PROCEDURE — 83735 ASSAY OF MAGNESIUM: CPT

## 2019-07-27 PROCEDURE — 93005 ELECTROCARDIOGRAM TRACING: CPT

## 2019-07-27 PROCEDURE — 85610 PROTHROMBIN TIME: CPT

## 2019-07-27 PROCEDURE — 99284 EMERGENCY DEPT VISIT MOD MDM: CPT | Mod: 25

## 2019-07-27 PROCEDURE — 84100 ASSAY OF PHOSPHORUS: CPT

## 2019-07-27 PROCEDURE — 96375 TX/PRO/DX INJ NEW DRUG ADDON: CPT

## 2019-07-27 PROCEDURE — 83690 ASSAY OF LIPASE: CPT

## 2019-07-27 PROCEDURE — 84295 ASSAY OF SERUM SODIUM: CPT

## 2019-07-27 PROCEDURE — 82947 ASSAY GLUCOSE BLOOD QUANT: CPT

## 2019-07-27 PROCEDURE — 99285 EMERGENCY DEPT VISIT HI MDM: CPT

## 2019-07-27 PROCEDURE — 83605 ASSAY OF LACTIC ACID: CPT

## 2019-07-27 PROCEDURE — 85730 THROMBOPLASTIN TIME PARTIAL: CPT

## 2019-07-27 PROCEDURE — 36415 COLL VENOUS BLD VENIPUNCTURE: CPT

## 2019-07-27 PROCEDURE — 71045 X-RAY EXAM CHEST 1 VIEW: CPT | Mod: 26

## 2019-07-27 PROCEDURE — 84132 ASSAY OF SERUM POTASSIUM: CPT

## 2019-07-27 RX ORDER — ONDANSETRON 8 MG/1
1 TABLET, FILM COATED ORAL
Qty: 12 | Refills: 0
Start: 2019-07-27 | End: 2019-07-30

## 2019-07-27 RX ORDER — ONDANSETRON 8 MG/1
4 TABLET, FILM COATED ORAL ONCE
Refills: 0 | Status: COMPLETED | OUTPATIENT
Start: 2019-07-27 | End: 2019-07-27

## 2019-07-27 RX ORDER — POTASSIUM PHOSPHATE, MONOBASIC POTASSIUM PHOSPHATE, DIBASIC 236; 224 MG/ML; MG/ML
15 INJECTION, SOLUTION INTRAVENOUS ONCE
Refills: 0 | Status: DISCONTINUED | OUTPATIENT
Start: 2019-07-27 | End: 2019-08-01

## 2019-07-27 RX ORDER — HYDROMORPHONE HYDROCHLORIDE 2 MG/ML
1 INJECTION INTRAMUSCULAR; INTRAVENOUS; SUBCUTANEOUS ONCE
Refills: 0 | Status: DISCONTINUED | OUTPATIENT
Start: 2019-07-27 | End: 2019-07-27

## 2019-07-27 RX ORDER — MORPHINE SULFATE 50 MG/1
8 CAPSULE, EXTENDED RELEASE ORAL ONCE
Refills: 0 | Status: DISCONTINUED | OUTPATIENT
Start: 2019-07-27 | End: 2019-07-27

## 2019-07-27 RX ORDER — MORPHINE SULFATE 50 MG/1
6 CAPSULE, EXTENDED RELEASE ORAL ONCE
Refills: 0 | Status: DISCONTINUED | OUTPATIENT
Start: 2019-07-27 | End: 2019-07-27

## 2019-07-27 RX ORDER — ONDANSETRON 8 MG/1
8 TABLET, FILM COATED ORAL ONCE
Refills: 0 | Status: COMPLETED | OUTPATIENT
Start: 2019-07-27 | End: 2019-07-27

## 2019-07-27 RX ORDER — SODIUM CHLORIDE 9 MG/ML
1000 INJECTION, SOLUTION INTRAVENOUS
Refills: 0 | Status: DISCONTINUED | OUTPATIENT
Start: 2019-07-27 | End: 2019-08-01

## 2019-07-27 RX ORDER — SODIUM CHLORIDE 9 MG/ML
1000 INJECTION INTRAMUSCULAR; INTRAVENOUS; SUBCUTANEOUS ONCE
Refills: 0 | Status: COMPLETED | OUTPATIENT
Start: 2019-07-27 | End: 2019-07-27

## 2019-07-27 RX ORDER — PANTOPRAZOLE SODIUM 20 MG/1
40 TABLET, DELAYED RELEASE ORAL ONCE
Refills: 0 | Status: COMPLETED | OUTPATIENT
Start: 2019-07-27 | End: 2019-07-27

## 2019-07-27 RX ORDER — HALOPERIDOL DECANOATE 100 MG/ML
2 INJECTION INTRAMUSCULAR ONCE
Refills: 0 | Status: COMPLETED | OUTPATIENT
Start: 2019-07-27 | End: 2019-07-27

## 2019-07-27 RX ORDER — METOCLOPRAMIDE HCL 10 MG
10 TABLET ORAL ONCE
Refills: 0 | Status: COMPLETED | OUTPATIENT
Start: 2019-07-27 | End: 2019-07-27

## 2019-07-27 RX ADMIN — ONDANSETRON 8 MILLIGRAM(S): 8 TABLET, FILM COATED ORAL at 08:49

## 2019-07-27 RX ADMIN — SODIUM CHLORIDE 200 MILLILITER(S): 9 INJECTION, SOLUTION INTRAVENOUS at 11:45

## 2019-07-27 RX ADMIN — HALOPERIDOL DECANOATE 2 MILLIGRAM(S): 100 INJECTION INTRAMUSCULAR at 11:16

## 2019-07-27 RX ADMIN — HYDROMORPHONE HYDROCHLORIDE 1 MILLIGRAM(S): 2 INJECTION INTRAMUSCULAR; INTRAVENOUS; SUBCUTANEOUS at 08:49

## 2019-07-27 RX ADMIN — MORPHINE SULFATE 8 MILLIGRAM(S): 50 CAPSULE, EXTENDED RELEASE ORAL at 08:02

## 2019-07-27 RX ADMIN — HYDROMORPHONE HYDROCHLORIDE 1 MILLIGRAM(S): 2 INJECTION INTRAMUSCULAR; INTRAVENOUS; SUBCUTANEOUS at 11:16

## 2019-07-27 RX ADMIN — SODIUM CHLORIDE 1000 MILLILITER(S): 9 INJECTION INTRAMUSCULAR; INTRAVENOUS; SUBCUTANEOUS at 08:49

## 2019-07-27 RX ADMIN — Medication 10 MILLIGRAM(S): at 09:54

## 2019-07-27 RX ADMIN — HYDROMORPHONE HYDROCHLORIDE 1 MILLIGRAM(S): 2 INJECTION INTRAMUSCULAR; INTRAVENOUS; SUBCUTANEOUS at 11:38

## 2019-07-27 RX ADMIN — HYDROMORPHONE HYDROCHLORIDE 1 MILLIGRAM(S): 2 INJECTION INTRAMUSCULAR; INTRAVENOUS; SUBCUTANEOUS at 09:54

## 2019-07-27 RX ADMIN — ONDANSETRON 4 MILLIGRAM(S): 8 TABLET, FILM COATED ORAL at 08:02

## 2019-07-27 RX ADMIN — SODIUM CHLORIDE 1000 MILLILITER(S): 9 INJECTION INTRAMUSCULAR; INTRAVENOUS; SUBCUTANEOUS at 08:02

## 2019-07-27 RX ADMIN — PANTOPRAZOLE SODIUM 40 MILLIGRAM(S): 20 TABLET, DELAYED RELEASE ORAL at 09:54

## 2019-07-27 RX ADMIN — Medication 20 MILLIGRAM(S): at 11:16

## 2019-07-27 RX ADMIN — HYDROMORPHONE HYDROCHLORIDE 1 MILLIGRAM(S): 2 INJECTION INTRAMUSCULAR; INTRAVENOUS; SUBCUTANEOUS at 09:39

## 2019-07-27 RX ADMIN — MORPHINE SULFATE 8 MILLIGRAM(S): 50 CAPSULE, EXTENDED RELEASE ORAL at 09:39

## 2019-07-27 NOTE — ED ADULT NURSE NOTE - OBJECTIVE STATEMENT
Assumed pt care at 0730.  Pt a&ox4 c/o 10/10 abdominal pain both R and LLQ radiating to back, pt diaphoretic and vomiting, pt states that he was 500 days sober and drank alcohol last night and smoked weed, also states he has hx of pancreatitis.  No acute s/s of respiratory distress noted or reported at this time, will continue to monitor

## 2019-07-27 NOTE — ED PROVIDER NOTE - PLAN OF CARE
1. Return to ED for worsening, progressive or any other concerning symptoms   2. Follow up with your primary care doctor in 2-3days   3. Take 4mg Zofran every 6-8 hours as needed for nausea   4. Take 20mg of bentyl every 6 hours for abdominal cramping/spasm

## 2019-07-27 NOTE — ED ADULT TRIAGE NOTE - CHIEF COMPLAINT QUOTE
Pt states "I'm sober from alcohol for 500 days and I drank some last night and smoke some weed", c/o pain radiating from right to left abd, denies diarrhea, c/o vomiting at home, pt diaphoretic

## 2019-07-27 NOTE — ED PROVIDER NOTE - PROGRESS NOTE DETAILS
patient with some improvement but still severe pain and intermittent retching, will redose meds and order CT, lipase mildly elvated, but in setting of chornic pancreatitis might be falsely low. -Misty DO Pt offered admission due to inability to tolerate PO, and abnormal findings on CT of ascites and periportal edema as well as dx of likely acute on chornic pancreatitis. patient declined admission. will Follow up outpt. pain resolved. still nauseous. The patient is leaving against medical advice. The patient has the capacity to refuse further medical evaluation and care. I had a lengthy discussion with the patient in which appropriate further evaluation and treatment was offered to the patient as well as acceptable alternative measures, and they declined. The patient understands that as a consequence of this decision they may be at risk for clinical deterioration including vomiting, esophageal rupture, liver failure, infection, permanent disability and death. The patient understands and verbalized the risks of leaving without further evaulation and treatment. Clear return precautions were discussed. The patient was urged to seek follow-up care, with appropriate referrals made as needed. The patient was also given a prescription for zofran and bentyl. -Misty DO

## 2019-07-27 NOTE — ED PROVIDER NOTE - PHYSICAL EXAMINATION
Gen: leaning forward, dry heaving, diaphoretic, AOx3  Head: NCAT  HEENT: PERRL, EOMI, oral mucosa moist, normal conjunctiva, neck supple  Lung: CTAB, no respiratory distress  CV: rrr, no murmur, Normal perfusion  Abd: soft, hypoactive BS, +epigastric ttp with gurading, no rebound  MSK: No edema, no visible deformities  Neuro: No focal neurologic deficits  Skin: No rash   Psych: normal affect

## 2019-07-27 NOTE — ED PROVIDER NOTE - OBJECTIVE STATEMENT
35yo M with h/o chronic pancreatitis, last episode >1 year ago, was drinking heavily last night. since 5am upper abd pain radiating into back and dry heavintg with NBNB vomiting. +chills and sweats. no fevers. no CP/SOB. no diarrhea. was sober for 500 days. has good outpt support. no h/o abd sx. pain sharp constant no improvement with vomiting.

## 2019-07-27 NOTE — ED PROVIDER NOTE - CARE PLAN
Principal Discharge DX:	Pancreatitis, chronic  Assessment and plan of treatment:	1. Return to ED for worsening, progressive or any other concerning symptoms   2. Follow up with your primary care doctor in 2-3days   3. Take 4mg Zofran every 6-8 hours as needed for nausea   4. Take 20mg of bentyl every 6 hours for abdominal cramping/spasm  Secondary Diagnosis:	Acute alcoholic gastritis without hemorrhage

## 2019-07-27 NOTE — ED PROVIDER NOTE - NS ED ROS FT
ROS: no CP/SOB. no cough. no fever. +n/v/ +abd pain. no rash. no bleeding. no urinary complaints. no weakness. no vision changes. no HA. no neck/back pain. no extremity swelling/deformity. No change in mental status.

## 2019-07-27 NOTE — ED PROVIDER NOTE - CLINICAL SUMMARY MEDICAL DECISION MAKING FREE TEXT BOX
patient with upper abd pain consistent with h/o pancreatitis, relapsed and was drinking last night. denies drug use. no CP/SOB. no fevers. acute onset today. will check labs. pain control. antiemetics. no CT now as acute in pain not consistent with necrosis. if requiring multiple medications will image.

## 2019-09-14 ENCOUNTER — EMERGENCY (EMERGENCY)
Facility: HOSPITAL | Age: 34
LOS: 1 days | Discharge: HOME | End: 2019-09-14
Attending: EMERGENCY MEDICINE
Payer: MEDICAID

## 2019-09-14 VITALS
DIASTOLIC BLOOD PRESSURE: 63 MMHG | TEMPERATURE: 98 F | RESPIRATION RATE: 16 BRPM | SYSTOLIC BLOOD PRESSURE: 110 MMHG | OXYGEN SATURATION: 100 % | HEART RATE: 75 BPM

## 2019-09-14 VITALS
OXYGEN SATURATION: 97 % | WEIGHT: 149.91 LBS | HEIGHT: 71 IN | SYSTOLIC BLOOD PRESSURE: 117 MMHG | HEART RATE: 85 BPM | RESPIRATION RATE: 18 BRPM | DIASTOLIC BLOOD PRESSURE: 72 MMHG | TEMPERATURE: 98 F

## 2019-09-14 LAB
ALBUMIN SERPL ELPH-MCNC: 4.9 G/DL — SIGNIFICANT CHANGE UP (ref 3.3–5.2)
ALP SERPL-CCNC: 92 U/L — SIGNIFICANT CHANGE UP (ref 40–120)
ALT FLD-CCNC: 19 U/L — SIGNIFICANT CHANGE UP
ANION GAP SERPL CALC-SCNC: 13 MMOL/L — SIGNIFICANT CHANGE UP (ref 5–17)
AST SERPL-CCNC: 21 U/L — SIGNIFICANT CHANGE UP
BASOPHILS # BLD AUTO: 0.05 K/UL — SIGNIFICANT CHANGE UP (ref 0–0.2)
BASOPHILS NFR BLD AUTO: 0.5 % — SIGNIFICANT CHANGE UP (ref 0–2)
BILIRUB SERPL-MCNC: 0.4 MG/DL — SIGNIFICANT CHANGE UP (ref 0.4–2)
BUN SERPL-MCNC: 17 MG/DL — SIGNIFICANT CHANGE UP (ref 8–20)
CALCIUM SERPL-MCNC: 9.9 MG/DL — SIGNIFICANT CHANGE UP (ref 8.6–10.2)
CHLORIDE SERPL-SCNC: 103 MMOL/L — SIGNIFICANT CHANGE UP (ref 98–107)
CO2 SERPL-SCNC: 25 MMOL/L — SIGNIFICANT CHANGE UP (ref 22–29)
CREAT SERPL-MCNC: 0.83 MG/DL — SIGNIFICANT CHANGE UP (ref 0.5–1.3)
EOSINOPHIL # BLD AUTO: 0.46 K/UL — SIGNIFICANT CHANGE UP (ref 0–0.5)
EOSINOPHIL NFR BLD AUTO: 4.6 % — SIGNIFICANT CHANGE UP (ref 0–6)
ETHANOL SERPL-MCNC: <10 MG/DL — SIGNIFICANT CHANGE UP
GLUCOSE SERPL-MCNC: 136 MG/DL — HIGH (ref 70–115)
HCT VFR BLD CALC: 45.9 % — SIGNIFICANT CHANGE UP (ref 39–50)
HGB BLD-MCNC: 16.3 G/DL — SIGNIFICANT CHANGE UP (ref 13–17)
IMM GRANULOCYTES NFR BLD AUTO: 0.4 % — SIGNIFICANT CHANGE UP (ref 0–1.5)
LIDOCAIN IGE QN: 21 U/L — LOW (ref 22–51)
LYMPHOCYTES # BLD AUTO: 1.83 K/UL — SIGNIFICANT CHANGE UP (ref 1–3.3)
LYMPHOCYTES # BLD AUTO: 18.1 % — SIGNIFICANT CHANGE UP (ref 13–44)
MCHC RBC-ENTMCNC: 34.5 PG — HIGH (ref 27–34)
MCHC RBC-ENTMCNC: 35.5 GM/DL — SIGNIFICANT CHANGE UP (ref 32–36)
MCV RBC AUTO: 97 FL — SIGNIFICANT CHANGE UP (ref 80–100)
MONOCYTES # BLD AUTO: 0.68 K/UL — SIGNIFICANT CHANGE UP (ref 0–0.9)
MONOCYTES NFR BLD AUTO: 6.7 % — SIGNIFICANT CHANGE UP (ref 2–14)
NEUTROPHILS # BLD AUTO: 7.04 K/UL — SIGNIFICANT CHANGE UP (ref 1.8–7.4)
NEUTROPHILS NFR BLD AUTO: 69.7 % — SIGNIFICANT CHANGE UP (ref 43–77)
PLATELET # BLD AUTO: 228 K/UL — SIGNIFICANT CHANGE UP (ref 150–400)
POTASSIUM SERPL-MCNC: 4.2 MMOL/L — SIGNIFICANT CHANGE UP (ref 3.5–5.3)
POTASSIUM SERPL-SCNC: 4.2 MMOL/L — SIGNIFICANT CHANGE UP (ref 3.5–5.3)
PROT SERPL-MCNC: 7.3 G/DL — SIGNIFICANT CHANGE UP (ref 6.6–8.7)
RBC # BLD: 4.73 M/UL — SIGNIFICANT CHANGE UP (ref 4.2–5.8)
RBC # FLD: 11.8 % — SIGNIFICANT CHANGE UP (ref 10.3–14.5)
SODIUM SERPL-SCNC: 141 MMOL/L — SIGNIFICANT CHANGE UP (ref 135–145)
WBC # BLD: 10.1 K/UL — SIGNIFICANT CHANGE UP (ref 3.8–10.5)
WBC # FLD AUTO: 10.1 K/UL — SIGNIFICANT CHANGE UP (ref 3.8–10.5)

## 2019-09-14 PROCEDURE — 74177 CT ABD & PELVIS W/CONTRAST: CPT | Mod: 26

## 2019-09-14 PROCEDURE — 96374 THER/PROPH/DIAG INJ IV PUSH: CPT | Mod: XU

## 2019-09-14 PROCEDURE — 83690 ASSAY OF LIPASE: CPT

## 2019-09-14 PROCEDURE — 96375 TX/PRO/DX INJ NEW DRUG ADDON: CPT

## 2019-09-14 PROCEDURE — 74177 CT ABD & PELVIS W/CONTRAST: CPT

## 2019-09-14 PROCEDURE — 99285 EMERGENCY DEPT VISIT HI MDM: CPT

## 2019-09-14 PROCEDURE — 36415 COLL VENOUS BLD VENIPUNCTURE: CPT

## 2019-09-14 PROCEDURE — 80307 DRUG TEST PRSMV CHEM ANLYZR: CPT

## 2019-09-14 PROCEDURE — 85027 COMPLETE CBC AUTOMATED: CPT

## 2019-09-14 PROCEDURE — 80053 COMPREHEN METABOLIC PANEL: CPT

## 2019-09-14 PROCEDURE — 99284 EMERGENCY DEPT VISIT MOD MDM: CPT | Mod: 25

## 2019-09-14 RX ORDER — METOCLOPRAMIDE HCL 10 MG
1 TABLET ORAL
Qty: 60 | Refills: 0
Start: 2019-09-14 | End: 2019-09-28

## 2019-09-14 RX ORDER — HYDROMORPHONE HYDROCHLORIDE 2 MG/ML
1 INJECTION INTRAMUSCULAR; INTRAVENOUS; SUBCUTANEOUS ONCE
Refills: 0 | Status: DISCONTINUED | OUTPATIENT
Start: 2019-09-14 | End: 2019-09-14

## 2019-09-14 RX ORDER — FAMOTIDINE 10 MG/ML
20 INJECTION INTRAVENOUS ONCE
Refills: 0 | Status: COMPLETED | OUTPATIENT
Start: 2019-09-14 | End: 2019-09-14

## 2019-09-14 RX ORDER — DIPHENHYDRAMINE HCL 50 MG
25 CAPSULE ORAL ONCE
Refills: 0 | Status: COMPLETED | OUTPATIENT
Start: 2019-09-14 | End: 2019-09-14

## 2019-09-14 RX ORDER — ONDANSETRON 8 MG/1
4 TABLET, FILM COATED ORAL ONCE
Refills: 0 | Status: COMPLETED | OUTPATIENT
Start: 2019-09-14 | End: 2019-09-14

## 2019-09-14 RX ORDER — SODIUM CHLORIDE 9 MG/ML
1000 INJECTION INTRAMUSCULAR; INTRAVENOUS; SUBCUTANEOUS ONCE
Refills: 0 | Status: COMPLETED | OUTPATIENT
Start: 2019-09-14 | End: 2019-09-14

## 2019-09-14 RX ORDER — PROCHLORPERAZINE MALEATE 5 MG
10 TABLET ORAL ONCE
Refills: 0 | Status: COMPLETED | OUTPATIENT
Start: 2019-09-14 | End: 2019-09-14

## 2019-09-14 RX ORDER — HALOPERIDOL DECANOATE 100 MG/ML
2.5 INJECTION INTRAMUSCULAR ONCE
Refills: 0 | Status: COMPLETED | OUTPATIENT
Start: 2019-09-14 | End: 2019-09-14

## 2019-09-14 RX ADMIN — Medication 25 MILLIGRAM(S): at 08:11

## 2019-09-14 RX ADMIN — SODIUM CHLORIDE 1000 MILLILITER(S): 9 INJECTION INTRAMUSCULAR; INTRAVENOUS; SUBCUTANEOUS at 08:12

## 2019-09-14 RX ADMIN — ONDANSETRON 4 MILLIGRAM(S): 8 TABLET, FILM COATED ORAL at 08:30

## 2019-09-14 RX ADMIN — HYDROMORPHONE HYDROCHLORIDE 1 MILLIGRAM(S): 2 INJECTION INTRAMUSCULAR; INTRAVENOUS; SUBCUTANEOUS at 10:50

## 2019-09-14 RX ADMIN — FAMOTIDINE 20 MILLIGRAM(S): 10 INJECTION INTRAVENOUS at 08:30

## 2019-09-14 RX ADMIN — HYDROMORPHONE HYDROCHLORIDE 1 MILLIGRAM(S): 2 INJECTION INTRAMUSCULAR; INTRAVENOUS; SUBCUTANEOUS at 09:52

## 2019-09-14 RX ADMIN — Medication 10 MILLIGRAM(S): at 08:12

## 2019-09-14 RX ADMIN — HALOPERIDOL DECANOATE 2.5 MILLIGRAM(S): 100 INJECTION INTRAMUSCULAR at 08:30

## 2019-09-14 NOTE — ED PROVIDER NOTE - CLINICAL SUMMARY MEDICAL DECISION MAKING FREE TEXT BOX
pt here with abd pain nausea, vomiting. symptoms similar to several prior visits with negative workup. will treat as cyclic vomiting for now given marijuana use. if unable to control pain and symptoms will give narcotics and ct a/p

## 2019-09-14 NOTE — ED ADULT NURSE REASSESSMENT NOTE - NS ED NURSE REASSESS COMMENT FT1
pt status improved, refer to flowsheet and chart, pt safety maintained, pt hemodynamically stable, pending CT results prior to dispo

## 2019-09-14 NOTE — ED PROVIDER NOTE - OBJECTIVE STATEMENT
Patient is a 34 year old male with history of substance abuse, chronic pancreatitis, gastritis presenting with abd pain and vomiting. Pt notes he woke up around 4 am with left sided abd pain and vomiting. Many episodes of vomiting and retching. No fevers, trauma, blood in stool or vomit. No diarrhea. No meds for symptoms. Pt has been seen in ED several times over the past 2 years for same complaint with negative labs and ct a/p. Pt requesting dilaudid for symptom control.

## 2019-09-14 NOTE — ED PROVIDER NOTE - PATIENT PORTAL LINK FT
You can access the FollowMyHealth Patient Portal offered by [facility] by registering at the following website: [email or web address]. By joining [facility]’s FollowMyHealth portal, you will also be able to view your health information using other applications (apps) compatible with our system.

## 2019-09-14 NOTE — ED PROVIDER NOTE - CONSTITUTIONAL, MLM
normal... uncomfortable appearing, well nourished, awake, alert, oriented to person, place, time/situation and curled up in fetal position sweating

## 2019-09-14 NOTE — ED ADULT TRIAGE NOTE - CHIEF COMPLAINT QUOTE
Im having left sided abd pain since 0400 radiating into my lower back with associated N/V/D, reports previous episodes with hospitalization, pt presents diaphoretic and actively vomiting in triage

## 2019-09-14 NOTE — ED PROVIDER NOTE - PROGRESS NOTE DETAILS
AJM: pt feeling slightly improved but still withabd pain. explaiend to patient he cannot recive narcotics for pain without abd imaging ordered. will order 1 mg dilaudid and ct ap AJM: pt feeling slightly improved but still with abd pain. explained to patient he cannot received narcotics for pain without abd imaging ordered. will order 1 mg dilaudid and ct ap AJM: pt feeling improved. workup neg. walking around ED. All results discussed with the patient, and a copy of results has been provided. Patient is comfortable with dc plan for home. Opportunity for questions was provided and all questions have been addressed. reglan rx sent. gi follow up given.

## 2019-11-13 NOTE — ED ADULT TRIAGE NOTE - BP NONINVASIVE SYSTOLIC (MM HG)
Quality 130: Documentation Of Current Medications In The Medical Record: Current Medications Documented Detail Level: Detailed Quality 431: Preventive Care And Screening: Unhealthy Alcohol Use - Screening: Patient screened for unhealthy alcohol use using a single question and scores less than 2 times per year Quality 110: Preventive Care And Screening: Influenza Immunization: Influenza Immunization not Administered for Documented Reasons. Quality 111:Pneumonia Vaccination Status For Older Adults: Pneumococcal Vaccination not Administered or Previously Received, Reason not Otherwise Specified Quality 226: Preventive Care And Screening: Tobacco Use: Screening And Cessation Intervention: Tobacco Screening not Performed for Medical Reasons 117

## 2019-12-10 ENCOUNTER — EMERGENCY (EMERGENCY)
Facility: HOSPITAL | Age: 34
LOS: 1 days | Discharge: HOME | End: 2019-12-10
Attending: EMERGENCY MEDICINE
Payer: MEDICAID

## 2019-12-10 VITALS
HEIGHT: 71 IN | WEIGHT: 169.98 LBS | RESPIRATION RATE: 22 BRPM | OXYGEN SATURATION: 99 % | DIASTOLIC BLOOD PRESSURE: 74 MMHG | TEMPERATURE: 98 F | HEART RATE: 100 BPM | SYSTOLIC BLOOD PRESSURE: 123 MMHG

## 2019-12-10 PROCEDURE — 99284 EMERGENCY DEPT VISIT MOD MDM: CPT

## 2019-12-11 VITALS
DIASTOLIC BLOOD PRESSURE: 50 MMHG | RESPIRATION RATE: 20 BRPM | HEART RATE: 64 BPM | OXYGEN SATURATION: 98 % | SYSTOLIC BLOOD PRESSURE: 100 MMHG

## 2019-12-11 LAB
ALBUMIN SERPL ELPH-MCNC: 4.7 G/DL — SIGNIFICANT CHANGE UP (ref 3.3–5.2)
ALP SERPL-CCNC: 78 U/L — SIGNIFICANT CHANGE UP (ref 40–120)
ALT FLD-CCNC: 18 U/L — SIGNIFICANT CHANGE UP
ANION GAP SERPL CALC-SCNC: 19 MMOL/L — HIGH (ref 5–17)
APTT BLD: 27.7 SEC — SIGNIFICANT CHANGE UP (ref 27.5–36.3)
AST SERPL-CCNC: 20 U/L — SIGNIFICANT CHANGE UP
BASOPHILS # BLD AUTO: 0.07 K/UL — SIGNIFICANT CHANGE UP (ref 0–0.2)
BASOPHILS NFR BLD AUTO: 0.3 % — SIGNIFICANT CHANGE UP (ref 0–2)
BILIRUB SERPL-MCNC: 0.3 MG/DL — LOW (ref 0.4–2)
BUN SERPL-MCNC: 21 MG/DL — HIGH (ref 8–20)
CALCIUM SERPL-MCNC: 9.6 MG/DL — SIGNIFICANT CHANGE UP (ref 8.6–10.2)
CHLORIDE SERPL-SCNC: 100 MMOL/L — SIGNIFICANT CHANGE UP (ref 98–107)
CK SERPL-CCNC: 96 U/L — SIGNIFICANT CHANGE UP (ref 30–200)
CO2 SERPL-SCNC: 21 MMOL/L — LOW (ref 22–29)
CREAT SERPL-MCNC: 0.76 MG/DL — SIGNIFICANT CHANGE UP (ref 0.5–1.3)
EOSINOPHIL # BLD AUTO: 0.04 K/UL — SIGNIFICANT CHANGE UP (ref 0–0.5)
EOSINOPHIL NFR BLD AUTO: 0.2 % — SIGNIFICANT CHANGE UP (ref 0–6)
GLUCOSE SERPL-MCNC: 176 MG/DL — HIGH (ref 70–115)
HCT VFR BLD CALC: 40.8 % — SIGNIFICANT CHANGE UP (ref 39–50)
HGB BLD-MCNC: 14.7 G/DL — SIGNIFICANT CHANGE UP (ref 13–17)
IMM GRANULOCYTES NFR BLD AUTO: 0.5 % — SIGNIFICANT CHANGE UP (ref 0–1.5)
INR BLD: 1.03 RATIO — SIGNIFICANT CHANGE UP (ref 0.88–1.16)
LIDOCAIN IGE QN: 17 U/L — LOW (ref 22–51)
LYMPHOCYTES # BLD AUTO: 1.62 K/UL — SIGNIFICANT CHANGE UP (ref 1–3.3)
LYMPHOCYTES # BLD AUTO: 6.9 % — LOW (ref 13–44)
MANUAL SMEAR VERIFICATION: SIGNIFICANT CHANGE UP
MCHC RBC-ENTMCNC: 34.4 PG — HIGH (ref 27–34)
MCHC RBC-ENTMCNC: 36 GM/DL — SIGNIFICANT CHANGE UP (ref 32–36)
MCV RBC AUTO: 95.6 FL — SIGNIFICANT CHANGE UP (ref 80–100)
MONOCYTES # BLD AUTO: 1.2 K/UL — HIGH (ref 0–0.9)
MONOCYTES NFR BLD AUTO: 5.1 % — SIGNIFICANT CHANGE UP (ref 2–14)
NEUTROPHILS # BLD AUTO: 20.31 K/UL — HIGH (ref 1.8–7.4)
NEUTROPHILS NFR BLD AUTO: 87 % — HIGH (ref 43–77)
PLAT MORPH BLD: NORMAL — SIGNIFICANT CHANGE UP
PLATELET # BLD AUTO: 269 K/UL — SIGNIFICANT CHANGE UP (ref 150–400)
POTASSIUM SERPL-MCNC: 3.5 MMOL/L — SIGNIFICANT CHANGE UP (ref 3.5–5.3)
POTASSIUM SERPL-SCNC: 3.5 MMOL/L — SIGNIFICANT CHANGE UP (ref 3.5–5.3)
PROT SERPL-MCNC: 7 G/DL — SIGNIFICANT CHANGE UP (ref 6.6–8.7)
PROTHROM AB SERPL-ACNC: 11.8 SEC — SIGNIFICANT CHANGE UP (ref 10–12.9)
RBC # BLD: 4.27 M/UL — SIGNIFICANT CHANGE UP (ref 4.2–5.8)
RBC # FLD: 11.7 % — SIGNIFICANT CHANGE UP (ref 10.3–14.5)
RBC BLD AUTO: NORMAL — SIGNIFICANT CHANGE UP
SODIUM SERPL-SCNC: 140 MMOL/L — SIGNIFICANT CHANGE UP (ref 135–145)
WBC # BLD: 23.35 K/UL — HIGH (ref 3.8–10.5)
WBC # FLD AUTO: 23.35 K/UL — HIGH (ref 3.8–10.5)

## 2019-12-11 PROCEDURE — 82550 ASSAY OF CK (CPK): CPT

## 2019-12-11 PROCEDURE — 85027 COMPLETE CBC AUTOMATED: CPT

## 2019-12-11 PROCEDURE — 96376 TX/PRO/DX INJ SAME DRUG ADON: CPT

## 2019-12-11 PROCEDURE — 96375 TX/PRO/DX INJ NEW DRUG ADDON: CPT

## 2019-12-11 PROCEDURE — 99284 EMERGENCY DEPT VISIT MOD MDM: CPT | Mod: 25

## 2019-12-11 PROCEDURE — 96361 HYDRATE IV INFUSION ADD-ON: CPT

## 2019-12-11 PROCEDURE — 85730 THROMBOPLASTIN TIME PARTIAL: CPT

## 2019-12-11 PROCEDURE — 80053 COMPREHEN METABOLIC PANEL: CPT

## 2019-12-11 PROCEDURE — 85610 PROTHROMBIN TIME: CPT

## 2019-12-11 PROCEDURE — 36415 COLL VENOUS BLD VENIPUNCTURE: CPT

## 2019-12-11 PROCEDURE — 83690 ASSAY OF LIPASE: CPT

## 2019-12-11 PROCEDURE — 96365 THER/PROPH/DIAG IV INF INIT: CPT

## 2019-12-11 RX ORDER — ONDANSETRON 8 MG/1
8 TABLET, FILM COATED ORAL ONCE
Refills: 0 | Status: COMPLETED | OUTPATIENT
Start: 2019-12-11 | End: 2019-12-11

## 2019-12-11 RX ORDER — MORPHINE SULFATE 50 MG/1
4 CAPSULE, EXTENDED RELEASE ORAL ONCE
Refills: 0 | Status: DISCONTINUED | OUTPATIENT
Start: 2019-12-11 | End: 2019-12-11

## 2019-12-11 RX ORDER — SODIUM CHLORIDE 9 MG/ML
1000 INJECTION, SOLUTION INTRAVENOUS ONCE
Refills: 0 | Status: COMPLETED | OUTPATIENT
Start: 2019-12-11 | End: 2019-12-11

## 2019-12-11 RX ORDER — ONDANSETRON 8 MG/1
1 TABLET, FILM COATED ORAL
Qty: 12 | Refills: 0
Start: 2019-12-11 | End: 2020-11-12

## 2019-12-11 RX ORDER — ONDANSETRON 8 MG/1
4 TABLET, FILM COATED ORAL ONCE
Refills: 0 | Status: COMPLETED | OUTPATIENT
Start: 2019-12-11 | End: 2019-12-11

## 2019-12-11 RX ORDER — ONDANSETRON 8 MG/1
1 TABLET, FILM COATED ORAL
Qty: 12 | Refills: 0
Start: 2019-12-11 | End: 2019-12-14

## 2019-12-11 RX ORDER — METOCLOPRAMIDE HCL 10 MG
10 TABLET ORAL ONCE
Refills: 0 | Status: COMPLETED | OUTPATIENT
Start: 2019-12-11 | End: 2019-12-11

## 2019-12-11 RX ADMIN — ONDANSETRON 8 MILLIGRAM(S): 8 TABLET, FILM COATED ORAL at 00:55

## 2019-12-11 RX ADMIN — MORPHINE SULFATE 4 MILLIGRAM(S): 50 CAPSULE, EXTENDED RELEASE ORAL at 05:03

## 2019-12-11 RX ADMIN — SODIUM CHLORIDE 1000 MILLILITER(S): 9 INJECTION, SOLUTION INTRAVENOUS at 03:05

## 2019-12-11 RX ADMIN — ONDANSETRON 4 MILLIGRAM(S): 8 TABLET, FILM COATED ORAL at 05:02

## 2019-12-11 RX ADMIN — MORPHINE SULFATE 4 MILLIGRAM(S): 50 CAPSULE, EXTENDED RELEASE ORAL at 01:03

## 2019-12-11 RX ADMIN — SODIUM CHLORIDE 2000 MILLILITER(S): 9 INJECTION, SOLUTION INTRAVENOUS at 00:55

## 2019-12-11 RX ADMIN — Medication 10 MILLIGRAM(S): at 03:05

## 2019-12-11 RX ADMIN — Medication 104 MILLIGRAM(S): at 02:06

## 2019-12-11 RX ADMIN — Medication 1 MILLIGRAM(S): at 03:03

## 2019-12-11 NOTE — ED ADULT NURSE NOTE - NSIMPLEMENTINTERV_GEN_ALL_ED
Implemented All Universal Safety Interventions:  [place] to call system. Call bell, personal items and telephone within reach. Instruct patient to call for assistance. Room bathroom lighting operational. Non-slip footwear when patient is off stretcher. Physically safe environment: no spills, clutter or unnecessary equipment. Stretcher in lowest position, wheels locked, appropriate side rails in place.

## 2019-12-11 NOTE — ED PROVIDER NOTE - CARE PROVIDER_API CALL
Eitan Barnes)  Gastroenterology; Internal Medicine  [address], [address]  [place] [address]  Phone: [phone]  Fax: [phone]  Follow Up Time:

## 2019-12-11 NOTE — ED PROVIDER NOTE - OBJECTIVE STATEMENT
34 year old male with Hx of Gastritis, Colitis, Pancreatitis, Stomach Ulcer presenting to the ED c/o abdominal pain with associated vomiting and shortness of breath. denies fever. denies HA or neck pain. no chest pain. no nausea/diarrhea. no urinary f/u/d. no back pain. no motor or sensory deficits. denies illicit drug use. no recent travel. no rash. no other acute issues symptoms or concerns

## 2019-12-11 NOTE — ED PROVIDER NOTE - PROGRESS NOTE DETAILS
labs nml abd soft nt nd labs nml . multiple prior visits fro same presentation reeviewed neg ct abd pelvis multiple times will not radiate abd d/y risk neoplasia with recurrent ct scans antiemetics refileed and advised gi f/u without fail suspect leukocytosis purely reactive stress induced to vomiting anxious . return to ed for intractable abdp ain fever any overall worsening

## 2019-12-11 NOTE — ED ADULT NURSE NOTE - OBJECTIVE STATEMENT
PT presents to ED for abdominal pain that started tonight. Pain left flank radiating to abdomen. Pt vomiting and guarding abdomen. Unable to straighten legs. PT has hx of pancreatitis. Last episode beginning of summer.  PT medicated per orders. CTM

## 2019-12-11 NOTE — ED ADULT NURSE NOTE - NS ED NOTE ABUSE SUSPICION NEGLECT YN
No

## 2019-12-11 NOTE — ED PROVIDER NOTE - PATIENT PORTAL LINK FT
You can access the FollowMyHealth Patient Portal offered by [facility] by registering at the following website: [email or web address]. By joining [employer]s FollowMyHealth portal, you will also be able to view your health information using other applications (apps) compatible with our system.

## 2020-01-17 ENCOUNTER — APPOINTMENT (OUTPATIENT)
Dept: GASTROENTEROLOGY | Facility: CLINIC | Age: 35
End: 2020-01-17

## 2020-03-01 ENCOUNTER — OUTPATIENT (OUTPATIENT)
Dept: OUTPATIENT SERVICES | Facility: HOSPITAL | Age: 35
LOS: 1 days | End: 2020-03-01
Payer: MEDICAID

## 2020-03-01 PROCEDURE — G9001: CPT

## 2020-03-26 ENCOUNTER — EMERGENCY (EMERGENCY)
Facility: HOSPITAL | Age: 35
LOS: 1 days | Discharge: HOME | End: 2020-03-26
Attending: EMERGENCY MEDICINE
Payer: MEDICAID

## 2020-03-26 VITALS
TEMPERATURE: 98 F | WEIGHT: 149.91 LBS | OXYGEN SATURATION: 98 % | HEIGHT: 71 IN | HEART RATE: 57 BPM | RESPIRATION RATE: 20 BRPM | SYSTOLIC BLOOD PRESSURE: 129 MMHG | DIASTOLIC BLOOD PRESSURE: 86 MMHG

## 2020-03-26 LAB
ALBUMIN SERPL ELPH-MCNC: 5.1 G/DL — SIGNIFICANT CHANGE UP (ref 3.3–5.2)
ALP SERPL-CCNC: 92 U/L — SIGNIFICANT CHANGE UP (ref 40–120)
ALT FLD-CCNC: 25 U/L — SIGNIFICANT CHANGE UP
ANION GAP SERPL CALC-SCNC: 18 MMOL/L — HIGH (ref 5–17)
AST SERPL-CCNC: 23 U/L — SIGNIFICANT CHANGE UP
BASOPHILS # BLD AUTO: 0.02 K/UL — SIGNIFICANT CHANGE UP (ref 0–0.2)
BASOPHILS NFR BLD AUTO: 0.2 % — SIGNIFICANT CHANGE UP (ref 0–2)
BILIRUB SERPL-MCNC: 0.5 MG/DL — SIGNIFICANT CHANGE UP (ref 0.4–2)
BUN SERPL-MCNC: 22 MG/DL — HIGH (ref 8–20)
CALCIUM SERPL-MCNC: 10.1 MG/DL — SIGNIFICANT CHANGE UP (ref 8.6–10.2)
CHLORIDE SERPL-SCNC: 96 MMOL/L — LOW (ref 98–107)
CO2 SERPL-SCNC: 25 MMOL/L — SIGNIFICANT CHANGE UP (ref 22–29)
CREAT SERPL-MCNC: 0.93 MG/DL — SIGNIFICANT CHANGE UP (ref 0.5–1.3)
EOSINOPHIL # BLD AUTO: 0 K/UL — SIGNIFICANT CHANGE UP (ref 0–0.5)
EOSINOPHIL NFR BLD AUTO: 0 % — SIGNIFICANT CHANGE UP (ref 0–6)
GLUCOSE SERPL-MCNC: 157 MG/DL — HIGH (ref 70–99)
HCT VFR BLD CALC: 45.5 % — SIGNIFICANT CHANGE UP (ref 39–50)
HGB BLD-MCNC: 16.4 G/DL — SIGNIFICANT CHANGE UP (ref 13–17)
IMM GRANULOCYTES NFR BLD AUTO: 0.3 % — SIGNIFICANT CHANGE UP (ref 0–1.5)
LACTATE BLDV-MCNC: 2.3 MMOL/L — HIGH (ref 0.5–2)
LIDOCAIN IGE QN: 18 U/L — LOW (ref 22–51)
LYMPHOCYTES # BLD AUTO: 1.02 K/UL — SIGNIFICANT CHANGE UP (ref 1–3.3)
LYMPHOCYTES # BLD AUTO: 9.2 % — LOW (ref 13–44)
MCHC RBC-ENTMCNC: 35.3 PG — HIGH (ref 27–34)
MCHC RBC-ENTMCNC: 36 GM/DL — SIGNIFICANT CHANGE UP (ref 32–36)
MCV RBC AUTO: 97.8 FL — SIGNIFICANT CHANGE UP (ref 80–100)
MONOCYTES # BLD AUTO: 0.61 K/UL — SIGNIFICANT CHANGE UP (ref 0–0.9)
MONOCYTES NFR BLD AUTO: 5.5 % — SIGNIFICANT CHANGE UP (ref 2–14)
NEUTROPHILS # BLD AUTO: 9.4 K/UL — HIGH (ref 1.8–7.4)
NEUTROPHILS NFR BLD AUTO: 84.8 % — HIGH (ref 43–77)
PLATELET # BLD AUTO: 266 K/UL — SIGNIFICANT CHANGE UP (ref 150–400)
POTASSIUM SERPL-MCNC: 4.4 MMOL/L — SIGNIFICANT CHANGE UP (ref 3.5–5.3)
POTASSIUM SERPL-SCNC: 4.4 MMOL/L — SIGNIFICANT CHANGE UP (ref 3.5–5.3)
PROT SERPL-MCNC: 7.7 G/DL — SIGNIFICANT CHANGE UP (ref 6.6–8.7)
RBC # BLD: 4.65 M/UL — SIGNIFICANT CHANGE UP (ref 4.2–5.8)
RBC # FLD: 11.7 % — SIGNIFICANT CHANGE UP (ref 10.3–14.5)
SODIUM SERPL-SCNC: 139 MMOL/L — SIGNIFICANT CHANGE UP (ref 135–145)
WBC # BLD: 11.08 K/UL — HIGH (ref 3.8–10.5)
WBC # FLD AUTO: 11.08 K/UL — HIGH (ref 3.8–10.5)

## 2020-03-26 PROCEDURE — 99284 EMERGENCY DEPT VISIT MOD MDM: CPT | Mod: 25

## 2020-03-26 PROCEDURE — 83690 ASSAY OF LIPASE: CPT

## 2020-03-26 PROCEDURE — 36415 COLL VENOUS BLD VENIPUNCTURE: CPT

## 2020-03-26 PROCEDURE — 99285 EMERGENCY DEPT VISIT HI MDM: CPT

## 2020-03-26 PROCEDURE — 83605 ASSAY OF LACTIC ACID: CPT

## 2020-03-26 PROCEDURE — 96376 TX/PRO/DX INJ SAME DRUG ADON: CPT

## 2020-03-26 PROCEDURE — 74177 CT ABD & PELVIS W/CONTRAST: CPT | Mod: 26

## 2020-03-26 PROCEDURE — 96375 TX/PRO/DX INJ NEW DRUG ADDON: CPT

## 2020-03-26 PROCEDURE — 74177 CT ABD & PELVIS W/CONTRAST: CPT

## 2020-03-26 PROCEDURE — 96374 THER/PROPH/DIAG INJ IV PUSH: CPT

## 2020-03-26 PROCEDURE — 85027 COMPLETE CBC AUTOMATED: CPT

## 2020-03-26 PROCEDURE — 80053 COMPREHEN METABOLIC PANEL: CPT

## 2020-03-26 RX ORDER — SODIUM CHLORIDE 9 MG/ML
2000 INJECTION INTRAMUSCULAR; INTRAVENOUS; SUBCUTANEOUS ONCE
Refills: 0 | Status: COMPLETED | OUTPATIENT
Start: 2020-03-26 | End: 2020-03-26

## 2020-03-26 RX ORDER — ONDANSETRON 8 MG/1
1 TABLET, FILM COATED ORAL
Qty: 30 | Refills: 0
Start: 2020-03-26 | End: 2020-04-24

## 2020-03-26 RX ORDER — SODIUM CHLORIDE 9 MG/ML
1000 INJECTION INTRAMUSCULAR; INTRAVENOUS; SUBCUTANEOUS ONCE
Refills: 0 | Status: COMPLETED | OUTPATIENT
Start: 2020-03-26 | End: 2020-03-26

## 2020-03-26 RX ORDER — ONDANSETRON 8 MG/1
4 TABLET, FILM COATED ORAL ONCE
Refills: 0 | Status: COMPLETED | OUTPATIENT
Start: 2020-03-26 | End: 2020-03-26

## 2020-03-26 RX ORDER — FAMOTIDINE 10 MG/ML
20 INJECTION INTRAVENOUS ONCE
Refills: 0 | Status: COMPLETED | OUTPATIENT
Start: 2020-03-26 | End: 2020-03-26

## 2020-03-26 RX ORDER — MORPHINE SULFATE 50 MG/1
4 CAPSULE, EXTENDED RELEASE ORAL ONCE
Refills: 0 | Status: DISCONTINUED | OUTPATIENT
Start: 2020-03-26 | End: 2020-03-26

## 2020-03-26 RX ORDER — FAMOTIDINE 10 MG/ML
1 INJECTION INTRAVENOUS
Qty: 10 | Refills: 0
Start: 2020-03-26 | End: 2020-04-04

## 2020-03-26 RX ADMIN — Medication 30 MILLILITER(S): at 15:36

## 2020-03-26 RX ADMIN — MORPHINE SULFATE 4 MILLIGRAM(S): 50 CAPSULE, EXTENDED RELEASE ORAL at 14:49

## 2020-03-26 RX ADMIN — ONDANSETRON 4 MILLIGRAM(S): 8 TABLET, FILM COATED ORAL at 14:50

## 2020-03-26 RX ADMIN — SODIUM CHLORIDE 1000 MILLILITER(S): 9 INJECTION INTRAMUSCULAR; INTRAVENOUS; SUBCUTANEOUS at 14:48

## 2020-03-26 RX ADMIN — MORPHINE SULFATE 4 MILLIGRAM(S): 50 CAPSULE, EXTENDED RELEASE ORAL at 16:04

## 2020-03-26 RX ADMIN — SODIUM CHLORIDE 2000 MILLILITER(S): 9 INJECTION INTRAMUSCULAR; INTRAVENOUS; SUBCUTANEOUS at 16:04

## 2020-03-26 RX ADMIN — ONDANSETRON 4 MILLIGRAM(S): 8 TABLET, FILM COATED ORAL at 16:04

## 2020-03-26 RX ADMIN — FAMOTIDINE 20 MILLIGRAM(S): 10 INJECTION INTRAVENOUS at 14:52

## 2020-03-26 NOTE — ED PROVIDER NOTE - PROGRESS NOTE DETAILS
Patient feels significantly improved. Conversation had with patient regarding results of testing. Patient agrees with plan for discharge at this time. Patient agrees to comply with follow up with PCP and/or GI. Return to ED precautions and discharge instructions given to patient.

## 2020-03-26 NOTE — ED PROVIDER NOTE - CLINICAL SUMMARY MEDICAL DECISION MAKING FREE TEXT BOX
Patient presenting with abdominal pain and actively vomiting. Labwork and imaging to evaluate for pancreatitis. CBC, CMP, Lactate, Lipase, CT A/P. Meds given for symptom control.

## 2020-03-26 NOTE — ED PROVIDER NOTE - PATIENT PORTAL LINK FT
You can access the FollowMyHealth Patient Portal offered by [facility] by registering at the following website: [email or web address]. By joining [facility]’s FollowMyHealth portal, you will also be able to view your health information using other applications (apps) compatible with our system.

## 2020-03-26 NOTE — ED PROVIDER NOTE - NS ED ROS FT
General: Endorses fever, chills  HEENT: Denies sensory changes, sore throat  Neck: Denies neck pain, neck stiffness  Resp: Denies coughing, SOB  Cardiovascular: Denies CP, palpitations, LE edema  GI: Endorses nausea, vomiting, abdominal pain. Denies diarrhea, constipation, blood in stool  : Denies dysuria, hematuria, frequency, incontinence  MSK: Endorses back pain  Neuro: Denies HA, dizziness, numbness, weakness  Skin: Denies rashes

## 2020-03-26 NOTE — ED PROVIDER NOTE - CARE PROVIDER_API CALL
Andreina Kramer (DO)  Gastroenterology  [address], [address]  [place] [address]  Phone: [phone]  Fax: [phone]  Follow Up Time: Routine

## 2020-03-26 NOTE — ED PROVIDER NOTE - ATTENDING CONTRIBUTION TO CARE
Addy: I performed a face to face evaluation of this patient and performed a full history and physical examination on the patient.  I agree with the resident's history, physical examination, and plan of the patient unless otherwise noted. My brief assessment is as follows: hx pancreatitis, gerd with 1 day of luq discomfort and vomiting. smoked thc today, states not daily but sometimes, denies recently etoh or other drugs. no psh, no diarrhea, no f/c, no cp, sob or urinary symptoms. mild/moderate distress, actively vomiting, ctab, rrr, no tremors, abd with miild luq ttp, neg murphys, no lower abd ttp. neuro intact. labs, ct, symptom control, reassess.

## 2020-03-26 NOTE — ED PROVIDER NOTE - PHYSICAL EXAMINATION
General: Ill appearing male in acute distress actively vomiting  HEENT: Normocephalic, atraumatic. Dry mucous membranes. Oropharynx clear. No lymphadenopathy.  Eyes: No scleral icterus. EOMI. DORINA.  Neck:. Soft and supple. Full ROM without pain. No midline tenderness  Cardiac: Regular rate and regular rhythm. No murmurs, rubs, gallops.   Resp: Lungs CTAB. Speaking in full sentences. No wheezes, rales or rhonchi.  Abd: TTP Epigastric and LUQ. Soft, non-distended. No guarding or rebound. No scars, masses, or lesions.  Back: Spine midline and non-tender. No CVA tenderness.  Skin: No rashes, abrasions, or lacerations.  Neuro: AO x 3. Moves all extremities symmetrically. Motor strength and sensation grossly intact

## 2020-03-26 NOTE — ED PROVIDER NOTE - OBJECTIVE STATEMENT
Pt is a 34yo M with history of pancreatitis presenting with abdominal pain, n/v x 1 day. He states that yesterday around 8pm he began to feel nauseous and started vomiting. He reports that he recently stopped his regular zofran and prilosec due to inability to pay. He also reports feeling feverish and abdominal pain. He states that he quit alcohol and smoking recently and only uses pot occasionally, last used today. He reports a COVID + exposure with his boss at work. Denies any chest pain, SOB, cough.

## 2020-03-27 DIAGNOSIS — Z71.89 OTHER SPECIFIED COUNSELING: ICD-10-CM

## 2020-06-26 ENCOUNTER — EMERGENCY (EMERGENCY)
Facility: HOSPITAL | Age: 35
LOS: 1 days | Discharge: LEFT BEFORE DISCHARGE | End: 2020-06-26
Attending: STUDENT IN AN ORGANIZED HEALTH CARE EDUCATION/TRAINING PROGRAM
Payer: MEDICAID

## 2020-06-26 VITALS
HEIGHT: 70 IN | DIASTOLIC BLOOD PRESSURE: 79 MMHG | OXYGEN SATURATION: 100 % | TEMPERATURE: 98 F | WEIGHT: 145.06 LBS | RESPIRATION RATE: 22 BRPM | HEART RATE: 62 BPM | SYSTOLIC BLOOD PRESSURE: 141 MMHG

## 2020-06-26 LAB
ALBUMIN SERPL ELPH-MCNC: 4.6 G/DL — SIGNIFICANT CHANGE UP (ref 3.3–5.2)
ALP SERPL-CCNC: 78 U/L — SIGNIFICANT CHANGE UP (ref 40–120)
ALT FLD-CCNC: 27 U/L — SIGNIFICANT CHANGE UP
ANION GAP SERPL CALC-SCNC: 15 MMOL/L — SIGNIFICANT CHANGE UP (ref 5–17)
APPEARANCE UR: CLEAR — SIGNIFICANT CHANGE UP
AST SERPL-CCNC: 28 U/L — SIGNIFICANT CHANGE UP
BACTERIA # UR AUTO: ABNORMAL
BASOPHILS # BLD AUTO: 0 K/UL — SIGNIFICANT CHANGE UP (ref 0–0.2)
BASOPHILS NFR BLD AUTO: 0 % — SIGNIFICANT CHANGE UP (ref 0–2)
BILIRUB SERPL-MCNC: 0.7 MG/DL — SIGNIFICANT CHANGE UP (ref 0.4–2)
BILIRUB UR-MCNC: NEGATIVE — SIGNIFICANT CHANGE UP
BUN SERPL-MCNC: 17 MG/DL — SIGNIFICANT CHANGE UP (ref 8–20)
CALCIUM SERPL-MCNC: 9.7 MG/DL — SIGNIFICANT CHANGE UP (ref 8.6–10.2)
CHLORIDE SERPL-SCNC: 100 MMOL/L — SIGNIFICANT CHANGE UP (ref 98–107)
CO2 SERPL-SCNC: 25 MMOL/L — SIGNIFICANT CHANGE UP (ref 22–29)
COLOR SPEC: YELLOW — SIGNIFICANT CHANGE UP
CREAT SERPL-MCNC: 0.76 MG/DL — SIGNIFICANT CHANGE UP (ref 0.5–1.3)
DIFF PNL FLD: NEGATIVE — SIGNIFICANT CHANGE UP
EOSINOPHIL # BLD AUTO: 0 K/UL — SIGNIFICANT CHANGE UP (ref 0–0.5)
EOSINOPHIL NFR BLD AUTO: 0 % — SIGNIFICANT CHANGE UP (ref 0–6)
EPI CELLS # UR: SIGNIFICANT CHANGE UP
GIANT PLATELETS BLD QL SMEAR: PRESENT — SIGNIFICANT CHANGE UP
GLUCOSE SERPL-MCNC: 131 MG/DL — HIGH (ref 70–99)
GLUCOSE UR QL: NEGATIVE MG/DL — SIGNIFICANT CHANGE UP
HCT VFR BLD CALC: 44.5 % — SIGNIFICANT CHANGE UP (ref 39–50)
HGB BLD-MCNC: 16.3 G/DL — SIGNIFICANT CHANGE UP (ref 13–17)
KETONES UR-MCNC: ABNORMAL
LACTATE BLDV-MCNC: 1.4 MMOL/L — SIGNIFICANT CHANGE UP (ref 0.5–2)
LEUKOCYTE ESTERASE UR-ACNC: NEGATIVE — SIGNIFICANT CHANGE UP
LIDOCAIN IGE QN: 19 U/L — LOW (ref 22–51)
LYMPHOCYTES # BLD AUTO: 0.88 K/UL — LOW (ref 1–3.3)
LYMPHOCYTES # BLD AUTO: 3.5 % — LOW (ref 13–44)
MANUAL SMEAR VERIFICATION: SIGNIFICANT CHANGE UP
MCHC RBC-ENTMCNC: 36.3 PG — HIGH (ref 27–34)
MCHC RBC-ENTMCNC: 36.6 GM/DL — HIGH (ref 32–36)
MCV RBC AUTO: 99.1 FL — SIGNIFICANT CHANGE UP (ref 80–100)
MONOCYTES # BLD AUTO: 1.3 K/UL — HIGH (ref 0–0.9)
MONOCYTES NFR BLD AUTO: 5.2 % — SIGNIFICANT CHANGE UP (ref 2–14)
MYELOCYTES NFR BLD: 0.9 % — HIGH (ref 0–0)
NEUTROPHILS # BLD AUTO: 22.44 K/UL — HIGH (ref 1.8–7.4)
NEUTROPHILS NFR BLD AUTO: 86.9 % — HIGH (ref 43–77)
NEUTS BAND # BLD: 2.6 % — SIGNIFICANT CHANGE UP (ref 0–8)
NITRITE UR-MCNC: NEGATIVE — SIGNIFICANT CHANGE UP
PH UR: 7 — SIGNIFICANT CHANGE UP (ref 5–8)
PLAT MORPH BLD: NORMAL — SIGNIFICANT CHANGE UP
PLATELET # BLD AUTO: 235 K/UL — SIGNIFICANT CHANGE UP (ref 150–400)
POTASSIUM SERPL-MCNC: 4.4 MMOL/L — SIGNIFICANT CHANGE UP (ref 3.5–5.3)
POTASSIUM SERPL-SCNC: 4.4 MMOL/L — SIGNIFICANT CHANGE UP (ref 3.5–5.3)
PROT SERPL-MCNC: 7 G/DL — SIGNIFICANT CHANGE UP (ref 6.6–8.7)
PROT UR-MCNC: 30 MG/DL
RBC # BLD: 4.49 M/UL — SIGNIFICANT CHANGE UP (ref 4.2–5.8)
RBC # FLD: 12.6 % — SIGNIFICANT CHANGE UP (ref 10.3–14.5)
RBC BLD AUTO: SIGNIFICANT CHANGE UP
RBC CASTS # UR COMP ASSIST: SIGNIFICANT CHANGE UP /HPF (ref 0–4)
SODIUM SERPL-SCNC: 140 MMOL/L — SIGNIFICANT CHANGE UP (ref 135–145)
SP GR SPEC: 1.01 — SIGNIFICANT CHANGE UP (ref 1.01–1.02)
STOMATOCYTES BLD QL SMEAR: SLIGHT — SIGNIFICANT CHANGE UP
UROBILINOGEN FLD QL: NEGATIVE MG/DL — SIGNIFICANT CHANGE UP
VARIANT LYMPHS # BLD: 0.9 % — SIGNIFICANT CHANGE UP (ref 0–6)
WBC # BLD: 25.07 K/UL — HIGH (ref 3.8–10.5)
WBC # FLD AUTO: 25.07 K/UL — HIGH (ref 3.8–10.5)
WBC UR QL: SIGNIFICANT CHANGE UP

## 2020-06-26 PROCEDURE — 85027 COMPLETE CBC AUTOMATED: CPT

## 2020-06-26 PROCEDURE — 74177 CT ABD & PELVIS W/CONTRAST: CPT | Mod: 26

## 2020-06-26 PROCEDURE — 87040 BLOOD CULTURE FOR BACTERIA: CPT

## 2020-06-26 PROCEDURE — 74177 CT ABD & PELVIS W/CONTRAST: CPT

## 2020-06-26 PROCEDURE — 83605 ASSAY OF LACTIC ACID: CPT

## 2020-06-26 PROCEDURE — 99285 EMERGENCY DEPT VISIT HI MDM: CPT

## 2020-06-26 PROCEDURE — 83690 ASSAY OF LIPASE: CPT

## 2020-06-26 PROCEDURE — 83735 ASSAY OF MAGNESIUM: CPT

## 2020-06-26 PROCEDURE — 96375 TX/PRO/DX INJ NEW DRUG ADDON: CPT

## 2020-06-26 PROCEDURE — 36415 COLL VENOUS BLD VENIPUNCTURE: CPT

## 2020-06-26 PROCEDURE — 99284 EMERGENCY DEPT VISIT MOD MDM: CPT | Mod: 25

## 2020-06-26 PROCEDURE — 80053 COMPREHEN METABOLIC PANEL: CPT

## 2020-06-26 PROCEDURE — 96374 THER/PROPH/DIAG INJ IV PUSH: CPT

## 2020-06-26 PROCEDURE — 81001 URINALYSIS AUTO W/SCOPE: CPT

## 2020-06-26 PROCEDURE — 96376 TX/PRO/DX INJ SAME DRUG ADON: CPT

## 2020-06-26 RX ORDER — OMEPRAZOLE 10 MG/1
1 CAPSULE, DELAYED RELEASE ORAL
Qty: 7 | Refills: 0
Start: 2020-06-26 | End: 2020-07-02

## 2020-06-26 RX ORDER — HYDROMORPHONE HYDROCHLORIDE 2 MG/ML
0.5 INJECTION INTRAMUSCULAR; INTRAVENOUS; SUBCUTANEOUS ONCE
Refills: 0 | Status: DISCONTINUED | OUTPATIENT
Start: 2020-06-26 | End: 2020-06-26

## 2020-06-26 RX ORDER — MORPHINE SULFATE 50 MG/1
4 CAPSULE, EXTENDED RELEASE ORAL ONCE
Refills: 0 | Status: DISCONTINUED | OUTPATIENT
Start: 2020-06-26 | End: 2020-06-26

## 2020-06-26 RX ORDER — FAMOTIDINE 10 MG/ML
20 INJECTION INTRAVENOUS ONCE
Refills: 0 | Status: COMPLETED | OUTPATIENT
Start: 2020-06-26 | End: 2020-06-26

## 2020-06-26 RX ORDER — ONDANSETRON 8 MG/1
4 TABLET, FILM COATED ORAL ONCE
Refills: 0 | Status: COMPLETED | OUTPATIENT
Start: 2020-06-26 | End: 2020-06-26

## 2020-06-26 RX ORDER — KETOROLAC TROMETHAMINE 30 MG/ML
15 SYRINGE (ML) INJECTION ONCE
Refills: 0 | Status: DISCONTINUED | OUTPATIENT
Start: 2020-06-26 | End: 2020-06-26

## 2020-06-26 RX ORDER — SODIUM CHLORIDE 9 MG/ML
2000 INJECTION INTRAMUSCULAR; INTRAVENOUS; SUBCUTANEOUS ONCE
Refills: 0 | Status: COMPLETED | OUTPATIENT
Start: 2020-06-26 | End: 2020-06-26

## 2020-06-26 RX ORDER — ONDANSETRON 8 MG/1
1 TABLET, FILM COATED ORAL
Qty: 8 | Refills: 0
Start: 2020-06-26 | End: 2020-06-27

## 2020-06-26 RX ADMIN — ONDANSETRON 4 MILLIGRAM(S): 8 TABLET, FILM COATED ORAL at 21:00

## 2020-06-26 RX ADMIN — HYDROMORPHONE HYDROCHLORIDE 0.5 MILLIGRAM(S): 2 INJECTION INTRAMUSCULAR; INTRAVENOUS; SUBCUTANEOUS at 21:00

## 2020-06-26 RX ADMIN — MORPHINE SULFATE 4 MILLIGRAM(S): 50 CAPSULE, EXTENDED RELEASE ORAL at 19:58

## 2020-06-26 RX ADMIN — Medication 15 MILLIGRAM(S): at 19:53

## 2020-06-26 RX ADMIN — MORPHINE SULFATE 4 MILLIGRAM(S): 50 CAPSULE, EXTENDED RELEASE ORAL at 19:48

## 2020-06-26 RX ADMIN — ONDANSETRON 4 MILLIGRAM(S): 8 TABLET, FILM COATED ORAL at 19:53

## 2020-06-26 RX ADMIN — FAMOTIDINE 20 MILLIGRAM(S): 10 INJECTION INTRAVENOUS at 19:53

## 2020-06-26 RX ADMIN — SODIUM CHLORIDE 2000 MILLILITER(S): 9 INJECTION INTRAMUSCULAR; INTRAVENOUS; SUBCUTANEOUS at 19:53

## 2020-06-26 NOTE — ED PROVIDER NOTE - CARE PLAN
Principal Discharge DX:	Nausea and vomiting in adult patient Principal Discharge DX:	Colitis  Secondary Diagnosis:	Nausea and vomiting in adult patient

## 2020-06-26 NOTE — ED ADULT TRIAGE NOTE - CHIEF COMPLAINT QUOTE
Patient A&Ox4 complaining of abdominal pain that began around 0800, stated has Hx of Pancreatitis, drank alcohol last night. Also complaining of nausea. Patient A&Ox4 complaining of abdominal pain that began around 0800, stated has Hx of Pancreatitis, drank alcohol last night. Also complaining of nausea. Stated ran out of his Zofran & Morphine today. Denies taking anything for pain today.

## 2020-06-26 NOTE — ED PROVIDER NOTE - PATIENT PORTAL LINK FT
You can access the FollowMyHealth Patient Portal offered by [facility] by registering at the following website: [email or web address]. By joining [employer]’s FollowMyHealth portal, you will also be able to view your health information using other applications (apps) compatible with our system.

## 2020-06-26 NOTE — ED PROVIDER NOTE - CARDIAC, MLM
[FreeTextEntry1] : According to Mom, teachers are commenting that he has a hard time focusing in class. Specifically that he needs reminders to remain on task. There are times when he is unable to understand spoken and written instructions so completes work incorrectly. When there are multiple steps he may not remember all of the steps. He struggles the most academically with reading comprehension. Reportedly his writing composition is appropriate for age. He is typically able to complete his work in class but does have extra time if needed as part of his IEP. He is not described as impulsive or disruptive in class.\par \par At home he requires assistance with initiating homework as he does not understand the instructions. There have been times when he has forgotten what he needed to complete homework but this is not frequent. He studies but then forgets information when he takes his exams.  He can typically multitask and has good time management. He can keep up in conversation and does not go off topic. He has good memory of social events. \par \par Socially he interacts well with other kids. He follows rules and does not require routine.
Normal rate, regular rhythm.  Heart sounds S1, S2.  No murmurs, rubs or gallops.

## 2020-06-26 NOTE — ED PROVIDER NOTE - CARE PROVIDER_API CALL
Mark Newsome  GASTROENTEROLOGY  [address] [place] [address]  Phone: [phone]  Fax: [phone]  Follow Up Time: 1-3 Days

## 2020-06-26 NOTE — ED PROVIDER NOTE - ENMT, MLM
Airway patent, Nasal mucosa clear. Mouth with normal mucosa. Throat has no vesicles, no oropharyngeal exudates and uvula is midline. Pt is spitting up white sputum.

## 2020-06-26 NOTE — ED PROVIDER NOTE - CLINICAL SUMMARY MEDICAL DECISION MAKING FREE TEXT BOX
Will obtain labs, hydrate and given unusual location of pain if recurrent pancreatitis/gastritis will obtain CT.

## 2020-06-26 NOTE — ED ADULT NURSE NOTE - CHIEF COMPLAINT QUOTE
Patient A&Ox4 complaining of abdominal pain that began around 0800, stated has Hx of Pancreatitis, drank alcohol last night. Also complaining of nausea. Stated ran out of his Zofran & Morphine today. Denies taking anything for pain today.

## 2020-06-26 NOTE — ED PROVIDER NOTE - PROGRESS NOTE DETAILS
Pt requesting additional pain medication, stating the initial dose took his pain from a 10 to a 9.5, but his nausea improved and nearly resolved. His CBC is noted and will order cultures and a lactate as it is unclear if this is a stress reaction vs acute infectious process. Pt pulled his IV out after the CT was completed and states he would like to leave even though I informed the pt that he has significant pathology that requires admission. Pt understands and recalls risks of leaving against medical advice, including death from XX. Pt states that pt will see PMD. Pt advised to return to the ED if pt feels worse. Patient feels much better and resting comfortably. Abdominal tenderness and pain have resolved. He is tolerating oral intake. Given elevated WBC's, I recommend continued observation and management in the ED. Pt understands and recalls risks of leaving against medical advice, including death from worsneing inflammation or possible infection. Pt states that pt will see PMD. Pt advised to return to the ED if pt feels worse.

## 2020-06-26 NOTE — ED PROVIDER NOTE - NSFOLLOWUPINSTRUCTIONS_ED_ALL_ED_FT
1) Follow up with your doctor in 1-2 days; you will need repeat blood work  2) Return to the ER immediately for worsening or concerning symptoms  3) take medication as prescribed    Acute Nausea and Vomiting    WHAT YOU NEED TO KNOW:    Acute nausea and vomiting start suddenly, worsen quickly, and last a short time.    DISCHARGE INSTRUCTIONS:    Return to the emergency department if:     You see blood in your vomit or your bowel movements.      You have sudden, severe pain in your chest and upper abdomen after hard vomiting or retching.      You have swelling in your neck and chest.       You are dizzy, cold, and thirsty and your eyes and mouth are dry.      You are urinating very little or not at all.      You have muscle weakness, leg cramps, and trouble breathing.       Your heart is beating much faster than normal.       You continue to vomit for more than 48 hours.     Contact your healthcare provider if:     You have frequent dry heaves (vomiting but nothing comes out).      Your nausea and vomiting does not get better or go away after you use medicine.      You have questions or concerns about your condition or treatment.    Medicines: You may need any of the following:     Medicines may be given to calm your stomach and stop your vomiting. You may also need medicines to help you feel more relaxed or to stop nausea and vomiting caused by motion sickness.      Gastrointestinal stimulants are used to help empty your stomach and bowels. This may help decrease nausea and vomiting.      Take your medicine as directed. Contact your healthcare provider if you think your medicine is not helping or if you have side effects. Tell him or her if you are allergic to any medicine. Keep a list of the medicines, vitamins, and herbs you take. Include the amounts, and when and why you take them. Bring the list or the pill bottles to follow-up visits. Carry your medicine list with you in case of an emergency.    Prevent or manage acute nausea and vomiting:     Do not drink alcohol. Alcohol may upset or irritate your stomach. Too much alcohol can also cause acute nausea and vomiting.      Control stress. Headaches due to stress may cause nausea and vomiting. Find ways to relax and manage your stress. Get more rest and sleep.      Drink more liquids as directed. Vomiting can lead to dehydration. It is important to drink more liquids to help replace lost body fluids. Ask your healthcare provider how much liquid to drink each day and which liquids are best for you. Your provider may recommend that you drink an oral rehydration solution (ORS). ORS contains water, salts, and sugar that are needed to replace the lost body fluids. Ask what kind of ORS to use, how much to drink, and where to get it.      Eat smaller meals, more often. Eat small amounts of food every 2 to 3 hours, even if you are not hungry. Food in your stomach may decrease your nausea.      Talk to your healthcare provider before you take over-the-counter (OTC) medicines. These medicines can cause serious problems if you use certain other medicines, or you have a medical condition. You may have problems if you use too much or use them for longer than the label says. Follow directions on the label carefully.     Follow up with your healthcare provider as directed: Write down your questions so you remember to ask them during your follow-up visits.    Leukocytosis  Leukocytosis means that a person has more white blood cells than normal. White blood cells are made in the bone marrow. Bone marrow is the spongy tissue inside bones. The main job of white blood cells is to fight infection.  Having too many white blood cells is a common condition. It can develop as a result of many types of medical problems.  What are the causes?  Leukocytosis may be caused by various conditions. In some cases, the bone marrow is normal but is still making too many white blood cells. This can be due to:  Infection.Injury.Physical stress.Emotional stress.Surgery.Allergic reactions.Tumors that do not start in the blood or bone marrow.An inherited disease.Certain medicines.Pregnancy and labor.In other cases, a person may have a bone marrow disorder that is causing the body to make too many white blood cells. Bone marrow disorders include:  Leukemia. This is a type of blood cancer.Myeloproliferative disorders. These disorders cause blood cells to grow abnormally.What are the signs or symptoms?  Often, this condition causes no symptoms. Some people may have symptoms due to the medical condition that is causing their leukocytosis. These symptoms may include:  Bleeding.Bruising.Fever.Night sweats.Swollen lymph nodes.An enlarged spleen.Repeated infections.Weakness.Weight loss.How is this diagnosed?  This condition is diagnosed with blood tests. It is often found when blood is tested as part of a routine physical exam. You may have other tests to help determine why you have too many white blood cells. These tests may include:  A complete blood count (CBC). This test measures all the types of blood cells in your body.Chest X-rays, urine tests, or other tests to look for signs of infection.Bone marrow aspiration. For this test, a needle is put into your bone. Cells from the bone marrow are removed through the needle and examined under a microscope.Other tests on the blood or bone marrow sample.CT scan, bone scan, or other imaging tests.How is this treated?  Usually, treatment is not needed for leukocytosis. However, if an infection, cancer, bone marrow disorder, or other serious problem is causing your leukocytosis, it will need to be treated. Treatment may include:  Regular monitoring of your white blood cell count to look for changes.Antibiotic medicine if you have a bacterial infection.Bone marrow transplant. This treatment replaces your diseased bone marrow with healthy cells that will grow new bone marrow.Chemotherapy or biological therapies such as the use of antibodies. These treatments may be used to kill cancer cells or to decrease the number of white blood cells.Follow these instructions at home:  Medicines     Take over-the-counter and prescription medicines only as told by your health care provider.If you were prescribed an antibiotic medicine, take it as told by your health care provider. Do not stop taking the antibiotic even if you start to feel better.Eating and drinking        Eat foods that are low in saturated fats and high in fiber. Eat plenty of fruits and vegetables.Drink enough fluid to keep your urine pale yellow.Limit your intake of caffeine and alcohol.General instructions     Maintain a healthy weight. Ask your health care provider what weight is best for you.Do 30 minutes of exercise at least 5 times each week. Check with your health care provider before you start a new exercise routine.Follow any safety precautions as told by your health care provider. This may be needed if you are at increased risk for infection or bleeding because of your condition.Do not use any products that contain nicotine or tobacco, such as cigarettes, e-cigarettes, and chewing tobacco. If you need help quitting, ask your health care provider.Keep all follow-up visits as told by your health care provider. This is important.Contact a health care provider if you:  Feel weak or more tired than usual.Develop chills, a cough, or nasal congestion.Have a fever.Lose weight without trying.Have night sweats.Bruise easily.Have new or worsening symptoms.Get help right away if you:  Bleed more than normal.Have chest pain.Have trouble breathing.Have uncontrolled nausea or vomiting.Feel dizzy or light-headed.  Summary  Leukocytosis means that a person has more white blood cells than normal.This condition often causes no symptoms.This condition may be caused by various conditions.If an infection, cancer, bone marrow disorder, or other serious problem is causing your leukocytosis, it will need to be treated.Keep all follow-up visits as told by your health care provider. This is important.This information is not intended to replace advice given to you by your health care provider. Make sure you discuss any questions you have with your health care provider. 1) Follow up with your doctor in 1-2 days; you will need repeat blood work  2) Return to the ER immediately for worsening or concerning symptoms  3) take medication as prescribed    Colitis    WHAT YOU NEED TO KNOW:    Colitis is swelling and irritation of your colon. Colitis may be caused by ulcers or a problem with your immune system. Bacteria, a virus, or a parasite may also cause colitis. The cause may not be known. You may have diarrhea, abdominal pain, fever, or blood or mucus in your bowel movement.    DISCHARGE INSTRUCTIONS:    Return to the emergency department if:     You have sudden trouble breathing.      Your bowel movements are black or have blood in them.      You have blood in your vomit.      You have severe abdominal pain or your abdomen is swollen and feels hard.      You have any of the following signs of dehydration:   Dizziness or weakness      Dry mouth, cracked lips, or severe thirst      Fast heartbeat or breathing      Urinating very little or not at all    Call your doctor if:     Your symptoms get worse or do not go away.      You have a fever, chills, cough, or feel weak and achy.      You suddenly lose weight without trying.      You have questions or concerns about your condition or care.    Medicines:     Medicines may be given to decrease inflammation in your colon and treat diarrhea.      Take your medicine as directed. Contact your healthcare provider if you think your medicine is not helping or if you have side effects. Tell him of her if you are allergic to any medicine. Keep a list of the medicines, vitamins, and herbs you take. Include the amounts, and when and why you take them. Bring the list or the pill bottles to follow-up visits. Carry your medicine list with you in case of an emergency.    Manage your symptoms:     Drink liquids as directed to help prevent dehydration. Good liquids to drink include water, juice, and broth. Ask how much liquid to drink each day. You may need to drink an oral rehydration solution (ORS). An ORS contains a balance of water, salt, and sugar to replace body fluids lost during diarrhea.      Eat a variety of healthy foods. Healthy foods include fruits, vegetables, whole-grain breads, beans, low-fat dairy products, lean meats, and fish. You may need to eat several small meals throughout the day instead of large meals. Avoid spicy foods, caffeine, chocolate, and foods high in fat.      Talk to your healthcare provider before you take NSAIDs. NSAIDs can cause worsen your symptoms if ulcers are causing your colitis.      Start to exercise when you feel better. Regular exercise helps your bowels work normally. Ask about the best exercise plan for you.    Prevent the spread of germs:          Wash your hands often. Wash your hands several times each day. Wash after you use the bathroom, change a child's diaper, and before you prepare or eat food. Use soap and water every time. Rub your soapy hands together, lacing your fingers. Wash the front and back of your hands, and in between your fingers. Use the fingers of one hand to scrub under the fingernails of the other hand. Wash for at least 20 seconds. Rinse with warm, running water for several seconds. Then dry your hands with a clean towel or paper towel. Use hand  that contains alcohol if soap and water are not available. Do not touch your eyes, nose, or mouth without washing your hands first.Handwashing           Cover a sneeze or cough. Use a tissue that covers your mouth and nose. Throw the tissue away in a trash can right away. Use the bend of your arm if a tissue is not available. Wash your hands well with soap and water or use a hand .      Clean surfaces often. Clean doorknobs, countertops, cell phones, and other surfaces that are touched often. Use a disinfecting wipe, a single-use sponge, or a cloth you can wash and reuse. Use disinfecting  if you do not have wipes. You can create a disinfecting  by mixing 1 part bleach with 10 parts water.      Ask about vaccines you may need. Vaccines help prevent disease caused by some viruses and bacteria. Get the influenza (flu) vaccine as soon as recommended each year. The flu vaccine is usually available starting in September or October. Flu viruses change, so it is important to get a flu vaccine every year. Get the pneumonia vaccine if recommended. This vaccine is usually recommended every 5 years. Your provider will tell you when to get this vaccine, if needed. Your healthcare provider can tell you if you should get other vaccines, and when to get them.    Follow up with your doctor as directed: You may need to return for a colonoscopy or other tests. Write down how often you have a bowel movements and what they look like. Bring this to your follow-up visits. Write down your questions so you remember to ask them during your visits.        Acute Nausea and Vomiting    WHAT YOU NEED TO KNOW:    Acute nausea and vomiting start suddenly, worsen quickly, and last a short time.    DISCHARGE INSTRUCTIONS:    Return to the emergency department if:     You see blood in your vomit or your bowel movements.      You have sudden, severe pain in your chest and upper abdomen after hard vomiting or retching.      You have swelling in your neck and chest.       You are dizzy, cold, and thirsty and your eyes and mouth are dry.      You are urinating very little or not at all.      You have muscle weakness, leg cramps, and trouble breathing.       Your heart is beating much faster than normal.       You continue to vomit for more than 48 hours.     Contact your healthcare provider if:     You have frequent dry heaves (vomiting but nothing comes out).      Your nausea and vomiting does not get better or go away after you use medicine.      You have questions or concerns about your condition or treatment.    Medicines: You may need any of the following:     Medicines may be given to calm your stomach and stop your vomiting. You may also need medicines to help you feel more relaxed or to stop nausea and vomiting caused by motion sickness.      Gastrointestinal stimulants are used to help empty your stomach and bowels. This may help decrease nausea and vomiting.      Take your medicine as directed. Contact your healthcare provider if you think your medicine is not helping or if you have side effects. Tell him or her if you are allergic to any medicine. Keep a list of the medicines, vitamins, and herbs you take. Include the amounts, and when and why you take them. Bring the list or the pill bottles to follow-up visits. Carry your medicine list with you in case of an emergency.    Prevent or manage acute nausea and vomiting:     Do not drink alcohol. Alcohol may upset or irritate your stomach. Too much alcohol can also cause acute nausea and vomiting.      Control stress. Headaches due to stress may cause nausea and vomiting. Find ways to relax and manage your stress. Get more rest and sleep.      Drink more liquids as directed. Vomiting can lead to dehydration. It is important to drink more liquids to help replace lost body fluids. Ask your healthcare provider how much liquid to drink each day and which liquids are best for you. Your provider may recommend that you drink an oral rehydration solution (ORS). ORS contains water, salts, and sugar that are needed to replace the lost body fluids. Ask what kind of ORS to use, how much to drink, and where to get it.      Eat smaller meals, more often. Eat small amounts of food every 2 to 3 hours, even if you are not hungry. Food in your stomach may decrease your nausea.      Talk to your healthcare provider before you take over-the-counter (OTC) medicines. These medicines can cause serious problems if you use certain other medicines, or you have a medical condition. You may have problems if you use too much or use them for longer than the label says. Follow directions on the label carefully.     Follow up with your healthcare provider as directed: Write down your questions so you remember to ask them during your follow-up visits.    Leukocytosis  Leukocytosis means that a person has more white blood cells than normal. White blood cells are made in the bone marrow. Bone marrow is the spongy tissue inside bones. The main job of white blood cells is to fight infection.  Having too many white blood cells is a common condition. It can develop as a result of many types of medical problems.  What are the causes?  Leukocytosis may be caused by various conditions. In some cases, the bone marrow is normal but is still making too many white blood cells. This can be due to:  Infection.Injury.Physical stress.Emotional stress.Surgery.Allergic reactions.Tumors that do not start in the blood or bone marrow.An inherited disease.Certain medicines.Pregnancy and labor.In other cases, a person may have a bone marrow disorder that is causing the body to make too many white blood cells. Bone marrow disorders include:  Leukemia. This is a type of blood cancer.Myeloproliferative disorders. These disorders cause blood cells to grow abnormally.What are the signs or symptoms?  Often, this condition causes no symptoms. Some people may have symptoms due to the medical condition that is causing their leukocytosis. These symptoms may include:  Bleeding.Bruising.Fever.Night sweats.Swollen lymph nodes.An enlarged spleen.Repeated infections.Weakness.Weight loss.How is this diagnosed?  This condition is diagnosed with blood tests. It is often found when blood is tested as part of a routine physical exam. You may have other tests to help determine why you have too many white blood cells. These tests may include:  A complete blood count (CBC). This test measures all the types of blood cells in your body.Chest X-rays, urine tests, or other tests to look for signs of infection.Bone marrow aspiration. For this test, a needle is put into your bone. Cells from the bone marrow are removed through the needle and examined under a microscope.Other tests on the blood or bone marrow sample.CT scan, bone scan, or other imaging tests.How is this treated?  Usually, treatment is not needed for leukocytosis. However, if an infection, cancer, bone marrow disorder, or other serious problem is causing your leukocytosis, it will need to be treated. Treatment may include:  Regular monitoring of your white blood cell count to look for changes.Antibiotic medicine if you have a bacterial infection.Bone marrow transplant. This treatment replaces your diseased bone marrow with healthy cells that will grow new bone marrow.Chemotherapy or biological therapies such as the use of antibodies. These treatments may be used to kill cancer cells or to decrease the number of white blood cells.Follow these instructions at home:  Medicines     Take over-the-counter and prescription medicines only as told by your health care provider.If you were prescribed an antibiotic medicine, take it as told by your health care provider. Do not stop taking the antibiotic even if you start to feel better.Eating and drinking        Eat foods that are low in saturated fats and high in fiber. Eat plenty of fruits and vegetables.Drink enough fluid to keep your urine pale yellow.Limit your intake of caffeine and alcohol.General instructions     Maintain a healthy weight. Ask your health care provider what weight is best for you.Do 30 minutes of exercise at least 5 times each week. Check with your health care provider before you start a new exercise routine.Follow any safety precautions as told by your health care provider. This may be needed if you are at increased risk for infection or bleeding because of your condition.Do not use any products that contain nicotine or tobacco, such as cigarettes, e-cigarettes, and chewing tobacco. If you need help quitting, ask your health care provider.Keep all follow-up visits as told by your health care provider. This is important.Contact a health care provider if you:  Feel weak or more tired than usual.Develop chills, a cough, or nasal congestion.Have a fever.Lose weight without trying.Have night sweats.Bruise easily.Have new or worsening symptoms.Get help right away if you:  Bleed more than normal.Have chest pain.Have trouble breathing.Have uncontrolled nausea or vomiting.Feel dizzy or light-headed.  Summary  Leukocytosis means that a person has more white blood cells than normal.This condition often causes no symptoms.This condition may be caused by various conditions.If an infection, cancer, bone marrow disorder, or other serious problem is causing your leukocytosis, it will need to be treated.Keep all follow-up visits as told by your health care provider. This is important.This information is not intended to replace advice given to you by your health care provider. Make sure you discuss any questions you have with your health care provider.

## 2020-06-26 NOTE — ED ADULT NURSE NOTE - OBJECTIVE STATEMENT
pt A&Ox4, c/o abdominal pain with associated persistent N/V that started this morning. Pt states that he has been sober for "quite some time" but he broke up with him girlfriend recently and began drinking again yesterday. This morning when he woke up he noticed he was experiencing abdominal pain near the periumbilical region with constant N/V and decreased PO intake.

## 2020-06-26 NOTE — ED ADULT NURSE REASSESSMENT NOTE - NS ED NURSE REASSESS COMMENT FT1
pt pulled out IV stating he wants to go home and  he does not want to wait for the result , MD Yates made aware and will come to bedside ,  pt A&Ox4, ambulating around ED without difficulty, resp even and unlabored no distress noted

## 2020-06-26 NOTE — ED PROVIDER NOTE - OBJECTIVE STATEMENT
36 y/o M pt with significant PMHx of Pancreatitis, Colitis and Gastritis secondary to EtOH abuse presents to the ED c/o abdominal pain with associated persistent N/V that started this morning. Pt states that he has been sober for "quite some time" but he broke up with him girlfriend recently and began drinking again yesterday. This morning when he woke up he noticed he was experiencing abdominal pain near the periumbilical region with constant N/V and decreased PO intake. When asked if this is the area where he usually has pain pt states he does not know. In the past pt states he has received Zofran, Morphine, Dilaudid and fluids which helps his symptoms resolves. Denies fever, chills, diarrhea, bruising or trauma. No further complaints at this time.

## 2020-07-01 LAB
CULTURE RESULTS: SIGNIFICANT CHANGE UP
CULTURE RESULTS: SIGNIFICANT CHANGE UP
SPECIMEN SOURCE: SIGNIFICANT CHANGE UP
SPECIMEN SOURCE: SIGNIFICANT CHANGE UP

## 2020-07-07 ENCOUNTER — EMERGENCY (EMERGENCY)
Facility: HOSPITAL | Age: 35
LOS: 1 days | Discharge: HOME | End: 2020-07-07
Attending: EMERGENCY MEDICINE
Payer: MEDICAID

## 2020-07-07 VITALS
SYSTOLIC BLOOD PRESSURE: 184 MMHG | DIASTOLIC BLOOD PRESSURE: 95 MMHG | RESPIRATION RATE: 18 BRPM | HEART RATE: 93 BPM | HEIGHT: 70 IN | WEIGHT: 145.06 LBS | TEMPERATURE: 98 F | OXYGEN SATURATION: 96 %

## 2020-07-07 LAB
ALBUMIN SERPL ELPH-MCNC: 4.6 G/DL — SIGNIFICANT CHANGE UP (ref 3.3–5.2)
ALP SERPL-CCNC: 70 U/L — SIGNIFICANT CHANGE UP (ref 40–120)
ALT FLD-CCNC: 20 U/L — SIGNIFICANT CHANGE UP
AMPHET UR-MCNC: NEGATIVE — SIGNIFICANT CHANGE UP
ANION GAP SERPL CALC-SCNC: 11 MMOL/L — SIGNIFICANT CHANGE UP (ref 5–17)
AST SERPL-CCNC: 22 U/L — SIGNIFICANT CHANGE UP
BARBITURATES UR SCN-MCNC: NEGATIVE — SIGNIFICANT CHANGE UP
BASOPHILS # BLD AUTO: 0.05 K/UL — SIGNIFICANT CHANGE UP (ref 0–0.2)
BASOPHILS NFR BLD AUTO: 0.6 % — SIGNIFICANT CHANGE UP (ref 0–2)
BENZODIAZ UR-MCNC: POSITIVE
BILIRUB SERPL-MCNC: 0.3 MG/DL — LOW (ref 0.4–2)
BUN SERPL-MCNC: 12 MG/DL — SIGNIFICANT CHANGE UP (ref 8–20)
CALCIUM SERPL-MCNC: 9.8 MG/DL — SIGNIFICANT CHANGE UP (ref 8.6–10.2)
CHLORIDE SERPL-SCNC: 104 MMOL/L — SIGNIFICANT CHANGE UP (ref 98–107)
CO2 SERPL-SCNC: 28 MMOL/L — SIGNIFICANT CHANGE UP (ref 22–29)
COCAINE METAB.OTHER UR-MCNC: NEGATIVE — SIGNIFICANT CHANGE UP
CREAT SERPL-MCNC: 0.74 MG/DL — SIGNIFICANT CHANGE UP (ref 0.5–1.3)
EOSINOPHIL # BLD AUTO: 0.33 K/UL — SIGNIFICANT CHANGE UP (ref 0–0.5)
EOSINOPHIL NFR BLD AUTO: 3.7 % — SIGNIFICANT CHANGE UP (ref 0–6)
ETHANOL SERPL-MCNC: <10 MG/DL — SIGNIFICANT CHANGE UP
GLUCOSE SERPL-MCNC: 126 MG/DL — HIGH (ref 70–99)
HCT VFR BLD CALC: 46.1 % — SIGNIFICANT CHANGE UP (ref 39–50)
HGB BLD-MCNC: 16.7 G/DL — SIGNIFICANT CHANGE UP (ref 13–17)
IMM GRANULOCYTES NFR BLD AUTO: 0.6 % — SIGNIFICANT CHANGE UP (ref 0–1.5)
LIDOCAIN IGE QN: 123 U/L — HIGH (ref 22–51)
LYMPHOCYTES # BLD AUTO: 1.8 K/UL — SIGNIFICANT CHANGE UP (ref 1–3.3)
LYMPHOCYTES # BLD AUTO: 20 % — SIGNIFICANT CHANGE UP (ref 13–44)
MAGNESIUM SERPL-MCNC: 1.8 MG/DL — SIGNIFICANT CHANGE UP (ref 1.6–2.6)
MCHC RBC-ENTMCNC: 36.2 GM/DL — HIGH (ref 32–36)
MCHC RBC-ENTMCNC: 36.6 PG — HIGH (ref 27–34)
MCV RBC AUTO: 101.1 FL — HIGH (ref 80–100)
METHADONE UR-MCNC: NEGATIVE — SIGNIFICANT CHANGE UP
MONOCYTES # BLD AUTO: 0.82 K/UL — SIGNIFICANT CHANGE UP (ref 0–0.9)
MONOCYTES NFR BLD AUTO: 9.1 % — SIGNIFICANT CHANGE UP (ref 2–14)
NEUTROPHILS # BLD AUTO: 5.93 K/UL — SIGNIFICANT CHANGE UP (ref 1.8–7.4)
NEUTROPHILS NFR BLD AUTO: 66 % — SIGNIFICANT CHANGE UP (ref 43–77)
OPIATES UR-MCNC: NEGATIVE — SIGNIFICANT CHANGE UP
PCP SPEC-MCNC: SIGNIFICANT CHANGE UP
PCP UR-MCNC: NEGATIVE — SIGNIFICANT CHANGE UP
PHOSPHATE SERPL-MCNC: 2.9 MG/DL — SIGNIFICANT CHANGE UP (ref 2.4–4.7)
PLATELET # BLD AUTO: 261 K/UL — SIGNIFICANT CHANGE UP (ref 150–400)
POTASSIUM SERPL-MCNC: 3.8 MMOL/L — SIGNIFICANT CHANGE UP (ref 3.5–5.3)
POTASSIUM SERPL-SCNC: 3.8 MMOL/L — SIGNIFICANT CHANGE UP (ref 3.5–5.3)
PROT SERPL-MCNC: 7 G/DL — SIGNIFICANT CHANGE UP (ref 6.6–8.7)
RBC # BLD: 4.56 M/UL — SIGNIFICANT CHANGE UP (ref 4.2–5.8)
RBC # FLD: 12.8 % — SIGNIFICANT CHANGE UP (ref 10.3–14.5)
SODIUM SERPL-SCNC: 143 MMOL/L — SIGNIFICANT CHANGE UP (ref 135–145)
THC UR QL: POSITIVE
WBC # BLD: 8.98 K/UL — SIGNIFICANT CHANGE UP (ref 3.8–10.5)
WBC # FLD AUTO: 8.98 K/UL — SIGNIFICANT CHANGE UP (ref 3.8–10.5)

## 2020-07-07 PROCEDURE — 74019 RADEX ABDOMEN 2 VIEWS: CPT | Mod: 26

## 2020-07-07 PROCEDURE — 99218: CPT

## 2020-07-07 PROCEDURE — 74177 CT ABD & PELVIS W/CONTRAST: CPT | Mod: 26

## 2020-07-07 RX ORDER — HYDROMORPHONE HYDROCHLORIDE 2 MG/ML
0.5 INJECTION INTRAMUSCULAR; INTRAVENOUS; SUBCUTANEOUS ONCE
Refills: 0 | Status: DISCONTINUED | OUTPATIENT
Start: 2020-07-07 | End: 2020-07-07

## 2020-07-07 RX ORDER — FAMOTIDINE 10 MG/ML
20 INJECTION INTRAVENOUS ONCE
Refills: 0 | Status: COMPLETED | OUTPATIENT
Start: 2020-07-07 | End: 2020-07-07

## 2020-07-07 RX ORDER — ONDANSETRON 8 MG/1
4 TABLET, FILM COATED ORAL EVERY 8 HOURS
Refills: 0 | Status: DISCONTINUED | OUTPATIENT
Start: 2020-07-07 | End: 2020-07-12

## 2020-07-07 RX ORDER — DIPHENHYDRAMINE HCL 50 MG
50 CAPSULE ORAL EVERY 6 HOURS
Refills: 0 | Status: DISCONTINUED | OUTPATIENT
Start: 2020-07-07 | End: 2020-07-12

## 2020-07-07 RX ORDER — KETOROLAC TROMETHAMINE 30 MG/ML
30 SYRINGE (ML) INJECTION ONCE
Refills: 0 | Status: DISCONTINUED | OUTPATIENT
Start: 2020-07-07 | End: 2020-07-07

## 2020-07-07 RX ORDER — METRONIDAZOLE 500 MG
500 TABLET ORAL ONCE
Refills: 0 | Status: COMPLETED | OUTPATIENT
Start: 2020-07-07 | End: 2020-07-07

## 2020-07-07 RX ORDER — METOCLOPRAMIDE HCL 10 MG
10 TABLET ORAL EVERY 8 HOURS
Refills: 0 | Status: DISCONTINUED | OUTPATIENT
Start: 2020-07-07 | End: 2020-07-12

## 2020-07-07 RX ORDER — METOCLOPRAMIDE HCL 10 MG
10 TABLET ORAL ONCE
Refills: 0 | Status: COMPLETED | OUTPATIENT
Start: 2020-07-07 | End: 2020-07-07

## 2020-07-07 RX ORDER — SUCRALFATE 1 G
1 TABLET ORAL ONCE
Refills: 0 | Status: COMPLETED | OUTPATIENT
Start: 2020-07-07 | End: 2020-07-07

## 2020-07-07 RX ORDER — CIPROFLOXACIN LACTATE 400MG/40ML
500 VIAL (ML) INTRAVENOUS
Refills: 0 | Status: DISCONTINUED | OUTPATIENT
Start: 2020-07-08 | End: 2020-07-12

## 2020-07-07 RX ORDER — HYDROMORPHONE HYDROCHLORIDE 2 MG/ML
1 INJECTION INTRAMUSCULAR; INTRAVENOUS; SUBCUTANEOUS ONCE
Refills: 0 | Status: DISCONTINUED | OUTPATIENT
Start: 2020-07-07 | End: 2020-07-07

## 2020-07-07 RX ORDER — SODIUM CHLORIDE 9 MG/ML
2000 INJECTION INTRAMUSCULAR; INTRAVENOUS; SUBCUTANEOUS ONCE
Refills: 0 | Status: COMPLETED | OUTPATIENT
Start: 2020-07-07 | End: 2020-07-07

## 2020-07-07 RX ORDER — PANTOPRAZOLE SODIUM 20 MG/1
40 TABLET, DELAYED RELEASE ORAL ONCE
Refills: 0 | Status: COMPLETED | OUTPATIENT
Start: 2020-07-07 | End: 2020-07-07

## 2020-07-07 RX ORDER — ONDANSETRON 8 MG/1
8 TABLET, FILM COATED ORAL ONCE
Refills: 0 | Status: COMPLETED | OUTPATIENT
Start: 2020-07-07 | End: 2020-07-07

## 2020-07-07 RX ORDER — CIPROFLOXACIN LACTATE 400MG/40ML
500 VIAL (ML) INTRAVENOUS ONCE
Refills: 0 | Status: COMPLETED | OUTPATIENT
Start: 2020-07-07 | End: 2020-07-07

## 2020-07-07 RX ORDER — LIDOCAINE 4 G/100G
5 CREAM TOPICAL ONCE
Refills: 0 | Status: COMPLETED | OUTPATIENT
Start: 2020-07-07 | End: 2020-07-07

## 2020-07-07 RX ORDER — ONDANSETRON 8 MG/1
4 TABLET, FILM COATED ORAL ONCE
Refills: 0 | Status: COMPLETED | OUTPATIENT
Start: 2020-07-07 | End: 2020-07-07

## 2020-07-07 RX ORDER — ACETAMINOPHEN 500 MG
975 TABLET ORAL ONCE
Refills: 0 | Status: COMPLETED | OUTPATIENT
Start: 2020-07-07 | End: 2020-07-07

## 2020-07-07 RX ORDER — METRONIDAZOLE 500 MG
500 TABLET ORAL EVERY 8 HOURS
Refills: 0 | Status: DISCONTINUED | OUTPATIENT
Start: 2020-07-07 | End: 2020-07-12

## 2020-07-07 RX ADMIN — Medication 1 MILLIGRAM(S): at 14:00

## 2020-07-07 RX ADMIN — Medication 50 MILLIGRAM(S): at 17:57

## 2020-07-07 RX ADMIN — ONDANSETRON 8 MILLIGRAM(S): 8 TABLET, FILM COATED ORAL at 09:55

## 2020-07-07 RX ADMIN — Medication 1 GRAM(S): at 09:56

## 2020-07-07 RX ADMIN — Medication 30 MILLILITER(S): at 09:55

## 2020-07-07 RX ADMIN — Medication 10 MILLIGRAM(S): at 15:17

## 2020-07-07 RX ADMIN — Medication 1 MILLIGRAM(S): at 11:40

## 2020-07-07 RX ADMIN — Medication 975 MILLIGRAM(S): at 10:45

## 2020-07-07 RX ADMIN — HYDROMORPHONE HYDROCHLORIDE 0.5 MILLIGRAM(S): 2 INJECTION INTRAMUSCULAR; INTRAVENOUS; SUBCUTANEOUS at 11:40

## 2020-07-07 RX ADMIN — ONDANSETRON 4 MILLIGRAM(S): 8 TABLET, FILM COATED ORAL at 13:21

## 2020-07-07 RX ADMIN — Medication 500 MILLIGRAM(S): at 15:16

## 2020-07-07 RX ADMIN — Medication 100 MILLIGRAM(S): at 21:05

## 2020-07-07 RX ADMIN — Medication 50 MILLIGRAM(S): at 15:17

## 2020-07-07 RX ADMIN — SODIUM CHLORIDE 2000 MILLILITER(S): 9 INJECTION INTRAMUSCULAR; INTRAVENOUS; SUBCUTANEOUS at 09:56

## 2020-07-07 RX ADMIN — Medication 50 MILLIGRAM(S): at 21:05

## 2020-07-07 RX ADMIN — Medication 30 MILLIGRAM(S): at 10:46

## 2020-07-07 RX ADMIN — Medication 10 MILLIGRAM(S): at 09:55

## 2020-07-07 RX ADMIN — HYDROMORPHONE HYDROCHLORIDE 1 MILLIGRAM(S): 2 INJECTION INTRAMUSCULAR; INTRAVENOUS; SUBCUTANEOUS at 09:55

## 2020-07-07 RX ADMIN — SODIUM CHLORIDE 2000 MILLILITER(S): 9 INJECTION INTRAMUSCULAR; INTRAVENOUS; SUBCUTANEOUS at 10:46

## 2020-07-07 RX ADMIN — PANTOPRAZOLE SODIUM 40 MILLIGRAM(S): 20 TABLET, DELAYED RELEASE ORAL at 09:56

## 2020-07-07 RX ADMIN — LIDOCAINE 5 MILLILITER(S): 4 CREAM TOPICAL at 09:55

## 2020-07-07 RX ADMIN — FAMOTIDINE 20 MILLIGRAM(S): 10 INJECTION INTRAVENOUS at 09:55

## 2020-07-07 RX ADMIN — Medication 50 MILLIGRAM(S): at 10:46

## 2020-07-07 NOTE — ED PROVIDER NOTE - CLINICAL SUMMARY MEDICAL DECISION MAKING FREE TEXT BOX
Pt states he feels much better., Abd is soft and non-surgical. he has tolerated PO. Mild colitis amenable to out pt PO mgt. trung Jones pt also presented with signs of etoh withdrawal, CIWA 14 at highest. Now improved with benzos. he wants detox. He will place on Obs pending covid swab result for detox placement.

## 2020-07-07 NOTE — SBIRT NOTE ADULT - NSSBIRTALCPASSREFTXDET_GEN_A_CORE
Options discussed for further evaluation and treatment, but referral to treatment was not completed due to pending COVID test results.  Patient motivated to attend detox as of 13:30; [facility] Detox Treatment Services was discussed.  [facility] Detox Doc-to-Doc/case presentation has been not completed at this time; awaiting test result, acceptance still pending at 13:30.  [facility] Detox Doc On-Call # [phone].    Offered [employer], external care navigation; patient declined. Options discussed for further evaluation and treatment, but referral to treatment was not completed due to pending COVID test results.  Patient motivated to attend detox as of 13:30, and as of 17:30 when re-visted; [facility] Detox Treatment Services discussed.    [facility] Detox Doc-to-Doc/case presentation has been not completed at this time; awaiting test result, case acceptance still pending at 13:30.    COVID test results still pending at 17:45.    [facility] Detox Doc on-call contact #:  [phone].    Offered [employer], external care navigation; patient consented and enrolled. Options discussed for further evaluation and treatment, but referral to treatment was not completed due to pending COVID test results.    Patient motivated to attend detox as of 13:30, and as of 17:30 when revisited; [facility] Treatment Services discussed.    [facility] Detox Doc-to-Doc/case presentation has been not completed at this time; awaiting test result, case acceptance still pending at 13:30.    COVID test results still pending at 17:45.  7/7 11:30 - Patient reported to be feeling better since last discussion on 7/6; revisit possible detox tx after next medication dose.    Health  F/U with attending RN, Doc, SW.  [facility] Detox Doc on-call contact #:  [phone].  Offered [employer], external care navigation; patient consented and enrolled.

## 2020-07-07 NOTE — ED CDU PROVIDER INITIAL DAY NOTE - OBJECTIVE STATEMENT
34 y/o M with a PMHx of ETOH abuse, stomach ulcer, gastritis, Colitis, pancreatitis presents to the ED c/o left sided abdominal pain with associated n/v that began at 4:00am today. Pt notes associated diarrhea. Denies any blood in stool. Pt admits to daily intake of alcohol about 1 liter per day. Pt states last drink was at 11pm last night.

## 2020-07-07 NOTE — ED ADULT NURSE NOTE - OBJECTIVE STATEMENT
Pt a&ox3 p/w with hx of alcohol abuse (1 liter of vodka daily) and hx of pancreatitis, and colitis. Pt states sudden onset left sided abd pain, Nausea, vomiting at 0400 today. Pt states last drink 11pm. Pt tender to palpation in LLQ and LUQ. Pt denies fever, HA, dizziness, weakness.

## 2020-07-07 NOTE — SBIRT NOTE ADULT - NSSBIRTDRGBRIEFINTDET_GEN_A_CORE
Provided SBIRT services: Full screen positive. Referral to Treatment attempted. Screening results were  reviewed with the patient and patient was provided information about healthy guidelines and potential negative  consequences associated with level of risk. Motivation and readiness to reduce or stop use was discussed and  goals and activities to make changes were suggested/offered.    Options discussed for further evaluation and treatment, but referral to treatment was not completed because  severity of illness / mental status -- Health  will re-attempt see above. See above see above

## 2020-07-07 NOTE — ED CDU PROVIDER INITIAL DAY NOTE - MEDICAL DECISION MAKING DETAILS
Pt presentd with vomitng and abd pain, likely mutlifactorial due to colitis and alcohol withdrawal. Colitis appears mild, and pt tolerated PO with non-surgical abd. Suitable for po mgt. place don Obs for detox placement

## 2020-07-07 NOTE — ED ADULT TRIAGE NOTE - CHIEF COMPLAINT QUOTE
Patient arrived via EMS, awake, alert, and oriented times 3, breathing unlabored.  Patient complaining of left sided abdominal pain and N/V that began at 0400am this morning.  patient admits to drinking every day, last drink being 11pm.

## 2020-07-07 NOTE — SBIRT NOTE ADULT - NSSBIRTBRIEFINTDET_GEN_A_CORE
Provided SBIRT services: Full screen positive. Referral to Treatment attempted. Screening results were  reviewed with the patient and patient was provided information about healthy guidelines and potential negative  consequences associated with level of risk. Motivation and readiness to reduce or stop use was discussed and  goals and activities to make changes were suggested/offered.      Options discussed for further evaluation and treatment, but referral to treatment was not completed because severity of illness / mental status -- Health  will re-attempt. Provided SBIRT services: Full screen positive. Referral to Treatment attempted. Screening results were  reviewed with the patient and patient was provided information about healthy guidelines and potential negative  consequences associated with level of risk. Motivation and readiness to reduce or stop use was discussed and  goals and activities to make changes were suggested/offered.      Options discussed for further evaluation and treatment, but referral to treatment was not completed due to pending COVID test results.  Patient motivated to attend detox as of 12:30; [facility] Detox Treatment Services was discussed.  [facility] Detox Doc-to-Doc/case presentation has been not completed at this time; awaiting test result, acceptance pending.  [facility] Detox Doc On-Call # [phone]. Provided SBIRT services: Full screen positive. Referral to Treatment attempted. Screening results were reviewed with the patient and patient was provided information about healthy guidelines and potential negative consequences associated with level of risk. Motivation and readiness to reduce or stop use was discussed and goals and activities to make changes were suggested/offered.      Options discussed for further evaluation and treatment, but referral to treatment was not completed due to pending COVID test results.  Patient motivated to attend detox as of 12:30; [facility] Detox Treatment Services was discussed.  [facility] Detox Doc-to-Doc/case presentation has been not completed at this time; awaiting test result, acceptance still pending.  [facility] Detox Doc On-Call # [phone]. Provided SBIRT services: Full screen positive. Referral to Treatment attempted. Screening results were reviewed with the patient and patient was provided information about healthy guidelines and potential negative consequences associated with level of risk. Motivation and readiness to reduce or stop use was discussed and goals and activities to make changes were suggested/offered.

## 2020-07-07 NOTE — ED PROVIDER NOTE - OBJECTIVE STATEMENT
36 y/o M with a PMHx of ETOH abuse, stomach ulcer, gastritis, Colitis, pancreatitis presents to the ED c/o left sided abdominal pain with associated n/v that began at 4:00am today. Pt notes associated diarrhea. Denies any blood in stool. Pt admits to daily intake of alcohol about 1 liter per day. Pt states last drink was at 11pm last night.

## 2020-07-08 VITALS
RESPIRATION RATE: 18 BRPM | TEMPERATURE: 99 F | HEART RATE: 79 BPM | OXYGEN SATURATION: 97 % | SYSTOLIC BLOOD PRESSURE: 111 MMHG | DIASTOLIC BLOOD PRESSURE: 70 MMHG

## 2020-07-08 LAB
MAGNESIUM SERPL-MCNC: 1.8 MG/DL — SIGNIFICANT CHANGE UP (ref 1.6–2.6)
PHOSPHATE SERPL-MCNC: 3.4 MG/DL — SIGNIFICANT CHANGE UP (ref 2.4–4.7)
SARS-COV-2 RNA SPEC QL NAA+PROBE: SIGNIFICANT CHANGE UP

## 2020-07-08 PROCEDURE — 80307 DRUG TEST PRSMV CHEM ANLYZR: CPT

## 2020-07-08 PROCEDURE — 80053 COMPREHEN METABOLIC PANEL: CPT

## 2020-07-08 PROCEDURE — 96361 HYDRATE IV INFUSION ADD-ON: CPT

## 2020-07-08 PROCEDURE — 96376 TX/PRO/DX INJ SAME DRUG ADON: CPT

## 2020-07-08 PROCEDURE — G0378: CPT

## 2020-07-08 PROCEDURE — 96372 THER/PROPH/DIAG INJ SC/IM: CPT | Mod: XU

## 2020-07-08 PROCEDURE — 83735 ASSAY OF MAGNESIUM: CPT

## 2020-07-08 PROCEDURE — 83690 ASSAY OF LIPASE: CPT

## 2020-07-08 PROCEDURE — U0003: CPT

## 2020-07-08 PROCEDURE — 74019 RADEX ABDOMEN 2 VIEWS: CPT

## 2020-07-08 PROCEDURE — 36415 COLL VENOUS BLD VENIPUNCTURE: CPT

## 2020-07-08 PROCEDURE — 99284 EMERGENCY DEPT VISIT MOD MDM: CPT | Mod: 25

## 2020-07-08 PROCEDURE — 74177 CT ABD & PELVIS W/CONTRAST: CPT

## 2020-07-08 PROCEDURE — 84100 ASSAY OF PHOSPHORUS: CPT

## 2020-07-08 PROCEDURE — 96375 TX/PRO/DX INJ NEW DRUG ADDON: CPT

## 2020-07-08 PROCEDURE — 99217: CPT

## 2020-07-08 PROCEDURE — 96374 THER/PROPH/DIAG INJ IV PUSH: CPT | Mod: XU

## 2020-07-08 PROCEDURE — 85027 COMPLETE CBC AUTOMATED: CPT

## 2020-07-08 RX ORDER — METRONIDAZOLE 500 MG
1 TABLET ORAL
Qty: 21 | Refills: 0
Start: 2020-07-08 | End: 2020-07-14

## 2020-07-08 RX ORDER — MOXIFLOXACIN HYDROCHLORIDE TABLETS, 400 MG 400 MG/1
1 TABLET, FILM COATED ORAL
Qty: 14 | Refills: 0
Start: 2020-07-08 | End: 2020-07-14

## 2020-07-08 NOTE — ED CDU PROVIDER SUBSEQUENT DAY NOTE - MEDICAL DECISION MAKING DETAILS
Pt. with hx of alcohol abuse stating that he was experiencing abdominal pain and vomiting. Pt. also requesting detox for alcohol addiction. Pt. is improving with Librium. Awaiting Covid swab and will re-assess.

## 2020-07-08 NOTE — ED ADULT NURSE REASSESSMENT NOTE - NS ED NURSE REASSESS COMMENT FT1
Assumed care from previous RN. Plan of care reviewed. Chief complaint of abdominal pain. Pt states he drinks one pint of vodka per day. Pt states his last drink was 07/06/2020 at 11pm. Abdomen soft, tender, non-distended. +Headache. Denies nausea, vomiting, diarrhea, chest pain, sob. Pt alert and oriented x3, respirations even and unlabored, skin warm and dry, color appropriate for ethnicity, speech clear, moving all extremities. Updated pt on plan of care. Will continue to monitor.
received pt alert and oriemntedx3, in  stretcher in no apparent signs of distress. Pt remains stable, CIWA assessment done ,protocol taken,  , PIV site WNL. Pt status unchanged, refer to flowsheet and chart, pt ID band in place, pt safety maintained, pt hemodynamically stable, updated on plan of care.  . Call light provided, fall safety reinforced. Will continue to monitor
Pt with great improvement, tolerating dinner tray, states he feels great. Pt has CIWA of 0 at this time.
Assumed care of the patient @[id number] from RN Jenniffer. Pt A&Ox4 . Patient in understanding of plan of care. Patient with no further questions for the RN. Resting in comfort. Call bell within reach and encouraged to use when assistance needed. Will continue to monitor.

## 2020-07-08 NOTE — ED ADULT NURSE REASSESSMENT NOTE - COMFORT CARE
meal provided/pt. is independent of care
wait time explained/meal provided/plan of care explained/po fluids offered/repositioned/darkened lights/side rails up

## 2020-07-08 NOTE — CHART NOTE - NSCHARTNOTEFT_GEN_A_CORE
POST DISCHARGE SOCIAL WORK NOTE:  SW WAS FOLLOWING CASE FOR TRANSFER TO DETOX UPON RECEIPT OF RESULTED COVID SWAB.  PATIENT WAS DISCHARGED WITHOUT SOCIAL WORK KNOWLDEGE AS PATIENT WAS FEELING BETTER AND VERBALIZED A PREFEENCE FOR OUTPATIENT TREATMENT AT THIS TIME.  SBIRT WAS NOTIFIED AND MET WITH PATIENT TO PROVIDE OUTPATIENT RESOURCES TO TO DISCUSS RISK FACTORS OF GOING HOME.  SW AND SBIRT DISCUSSED CASE POST DISCHARGE.

## 2020-07-08 NOTE — ED CDU PROVIDER SUBSEQUENT DAY NOTE - HISTORY
Pt. received from Dr. Adkins. Pt with hx of alcohol abuse present with vomiting and abd pain. Pt. was placed in observation unit for detox. Pt. has been resting comfortably throughout the night on a Librium tapered dose. Pt. still pending covid test results. Pt. evaluated this morning. Pt. states that he is feeling better. No tremors. No signs of withdrawal. Pt. ate dinner. Pt. currently has no complaint.

## 2020-07-08 NOTE — ED CDU PROVIDER SUBSEQUENT DAY NOTE - PHYSICAL EXAMINATION
Pt. awake and alert. NO distress  Heart-RRR.   Abd-Soft/NT  EXT-Moving all extremities.   Neuro-No tremors/No focal deficit.

## 2020-07-08 NOTE — ED CDU PROVIDER DISPOSITION NOTE - CLINICAL COURSE
Pt. with hx of alcohol abuse presenting to the ED for abdominal pain and vomiting. Pt. was admitted to our observation unit for placement to a detox facility. Pt. was awaiting Covid swab. Pt. however improved during his stay in our observation unit. Pt. would rather go home and follow up. Pt. was seen by SBIRT and was given outpatient resources. Pt. with hx of alcohol abuse presenting to the ED for abdominal pain and vomiting. Pt. was admitted to our observation unit for placement to a detox facility. Pt. was awaiting Covid swab. Pt. however improved during his stay in our observation unit. Pt. would rather go home and follow up. Pt. was seen by SBIRT and was given outpatient resources. Pt. will be discharged on ABX for possible colitis and PO librium.

## 2020-07-13 PROBLEM — F10.10 ALCOHOL ABUSE, UNCOMPLICATED: Chronic | Status: ACTIVE | Noted: 2020-06-26

## 2020-09-02 ENCOUNTER — EMERGENCY (EMERGENCY)
Facility: HOSPITAL | Age: 35
LOS: 1 days | Discharge: HOME | End: 2020-09-02
Attending: STUDENT IN AN ORGANIZED HEALTH CARE EDUCATION/TRAINING PROGRAM
Payer: MEDICAID

## 2020-09-02 VITALS
OXYGEN SATURATION: 96 % | SYSTOLIC BLOOD PRESSURE: 114 MMHG | WEIGHT: 139.99 LBS | HEIGHT: 71 IN | TEMPERATURE: 99 F | RESPIRATION RATE: 18 BRPM | HEART RATE: 125 BPM | DIASTOLIC BLOOD PRESSURE: 72 MMHG

## 2020-09-02 PROCEDURE — 99284 EMERGENCY DEPT VISIT MOD MDM: CPT

## 2020-09-02 NOTE — ED ADULT NURSE NOTE - OBJECTIVE STATEMENT
pt arrived c/o abscess on tooth and lip. states pus drainage from mouth and eyes. reports body aches and states he has not urinated all day. denies fever, chest pain, sob, dizziness, nausea, vomiting. pt was on abx for 2 weeks for tooth procedure. swelling and pain in mouth started friday and tooth was pulled monday and pt states it has not resolved. pt aao x4. respirations even and unlabored. skin color wnl warm and dry.

## 2020-09-02 NOTE — ED ADULT TRIAGE NOTE - CHIEF COMPLAINT QUOTE
c/o abcess in mouth since friday. pt states he had tooth on left side, bottom pulled yesterday. pt has puss coming from lower bottom lip, unable to open mouth all the way. no other s/s of acute distress.

## 2020-09-03 VITALS
SYSTOLIC BLOOD PRESSURE: 120 MMHG | DIASTOLIC BLOOD PRESSURE: 90 MMHG | OXYGEN SATURATION: 100 % | RESPIRATION RATE: 15 BRPM | TEMPERATURE: 98 F | HEART RATE: 74 BPM

## 2020-09-03 LAB
ALBUMIN SERPL ELPH-MCNC: 4 G/DL — SIGNIFICANT CHANGE UP (ref 3.3–5.2)
ALP SERPL-CCNC: 54 U/L — SIGNIFICANT CHANGE UP (ref 40–120)
ALT FLD-CCNC: 20 U/L — SIGNIFICANT CHANGE UP
AMMONIA BLD-MCNC: 34 UMOL/L — SIGNIFICANT CHANGE UP (ref 11–55)
ANION GAP SERPL CALC-SCNC: 12 MMOL/L — SIGNIFICANT CHANGE UP (ref 5–17)
AST SERPL-CCNC: 22 U/L — SIGNIFICANT CHANGE UP
BASOPHILS # BLD AUTO: 0.05 K/UL — SIGNIFICANT CHANGE UP (ref 0–0.2)
BASOPHILS NFR BLD AUTO: 0.7 % — SIGNIFICANT CHANGE UP (ref 0–2)
BILIRUB DIRECT SERPL-MCNC: <0.1 MG/DL — SIGNIFICANT CHANGE UP (ref 0–0.3)
BILIRUB INDIRECT FLD-MCNC: SIGNIFICANT CHANGE UP MG/DL (ref 0.2–1)
BILIRUB SERPL-MCNC: <0.2 MG/DL — LOW (ref 0.4–2)
BILIRUB SERPL-MCNC: <0.2 MG/DL — LOW (ref 0.4–2)
BUN SERPL-MCNC: 15 MG/DL — SIGNIFICANT CHANGE UP (ref 8–20)
CALCIUM SERPL-MCNC: 9.3 MG/DL — SIGNIFICANT CHANGE UP (ref 8.6–10.2)
CHLORIDE SERPL-SCNC: 100 MMOL/L — SIGNIFICANT CHANGE UP (ref 98–107)
CO2 SERPL-SCNC: 26 MMOL/L — SIGNIFICANT CHANGE UP (ref 22–29)
CREAT SERPL-MCNC: 0.76 MG/DL — SIGNIFICANT CHANGE UP (ref 0.5–1.3)
EOSINOPHIL # BLD AUTO: 0.79 K/UL — HIGH (ref 0–0.5)
EOSINOPHIL NFR BLD AUTO: 11.7 % — HIGH (ref 0–6)
GLUCOSE SERPL-MCNC: 104 MG/DL — HIGH (ref 70–99)
HCT VFR BLD CALC: 38.5 % — LOW (ref 39–50)
HGB BLD-MCNC: 13.9 G/DL — SIGNIFICANT CHANGE UP (ref 13–17)
IMM GRANULOCYTES NFR BLD AUTO: 0.1 % — SIGNIFICANT CHANGE UP (ref 0–1.5)
LYMPHOCYTES # BLD AUTO: 1.81 K/UL — SIGNIFICANT CHANGE UP (ref 1–3.3)
LYMPHOCYTES # BLD AUTO: 26.8 % — SIGNIFICANT CHANGE UP (ref 13–44)
MCHC RBC-ENTMCNC: 36 PG — HIGH (ref 27–34)
MCHC RBC-ENTMCNC: 36.1 GM/DL — HIGH (ref 32–36)
MCV RBC AUTO: 99.7 FL — SIGNIFICANT CHANGE UP (ref 80–100)
MONOCYTES # BLD AUTO: 0.79 K/UL — SIGNIFICANT CHANGE UP (ref 0–0.9)
MONOCYTES NFR BLD AUTO: 11.7 % — SIGNIFICANT CHANGE UP (ref 2–14)
NEUTROPHILS # BLD AUTO: 3.31 K/UL — SIGNIFICANT CHANGE UP (ref 1.8–7.4)
NEUTROPHILS NFR BLD AUTO: 49 % — SIGNIFICANT CHANGE UP (ref 43–77)
PLATELET # BLD AUTO: 308 K/UL — SIGNIFICANT CHANGE UP (ref 150–400)
POTASSIUM SERPL-MCNC: 4.3 MMOL/L — SIGNIFICANT CHANGE UP (ref 3.5–5.3)
POTASSIUM SERPL-SCNC: 4.3 MMOL/L — SIGNIFICANT CHANGE UP (ref 3.5–5.3)
PROT SERPL-MCNC: 6.4 G/DL — LOW (ref 6.6–8.7)
RBC # BLD: 3.86 M/UL — LOW (ref 4.2–5.8)
RBC # FLD: 11.5 % — SIGNIFICANT CHANGE UP (ref 10.3–14.5)
SODIUM SERPL-SCNC: 138 MMOL/L — SIGNIFICANT CHANGE UP (ref 135–145)
WBC # BLD: 6.76 K/UL — SIGNIFICANT CHANGE UP (ref 3.8–10.5)
WBC # FLD AUTO: 6.76 K/UL — SIGNIFICANT CHANGE UP (ref 3.8–10.5)

## 2020-09-03 PROCEDURE — 99284 EMERGENCY DEPT VISIT MOD MDM: CPT | Mod: 25

## 2020-09-03 PROCEDURE — 80053 COMPREHEN METABOLIC PANEL: CPT

## 2020-09-03 PROCEDURE — 82140 ASSAY OF AMMONIA: CPT

## 2020-09-03 PROCEDURE — 96361 HYDRATE IV INFUSION ADD-ON: CPT

## 2020-09-03 PROCEDURE — 85027 COMPLETE CBC AUTOMATED: CPT

## 2020-09-03 PROCEDURE — 36415 COLL VENOUS BLD VENIPUNCTURE: CPT

## 2020-09-03 PROCEDURE — 82248 BILIRUBIN DIRECT: CPT

## 2020-09-03 PROCEDURE — 96374 THER/PROPH/DIAG INJ IV PUSH: CPT

## 2020-09-03 RX ORDER — VALACYCLOVIR 500 MG/1
1 TABLET, FILM COATED ORAL
Qty: 20 | Refills: 0
Start: 2020-09-03 | End: 2020-09-12

## 2020-09-03 RX ORDER — VALACYCLOVIR 500 MG/1
1000 TABLET, FILM COATED ORAL ONCE
Refills: 0 | Status: COMPLETED | OUTPATIENT
Start: 2020-09-03 | End: 2020-09-03

## 2020-09-03 RX ORDER — ACETAMINOPHEN 500 MG
650 TABLET ORAL ONCE
Refills: 0 | Status: COMPLETED | OUTPATIENT
Start: 2020-09-03 | End: 2020-09-03

## 2020-09-03 RX ORDER — KETOROLAC TROMETHAMINE 30 MG/ML
30 SYRINGE (ML) INJECTION ONCE
Refills: 0 | Status: DISCONTINUED | OUTPATIENT
Start: 2020-09-03 | End: 2020-09-03

## 2020-09-03 RX ORDER — IBUPROFEN 200 MG
1 TABLET ORAL
Qty: 9 | Refills: 0
Start: 2020-09-03 | End: 2020-09-05

## 2020-09-03 RX ORDER — SODIUM CHLORIDE 9 MG/ML
1000 INJECTION INTRAMUSCULAR; INTRAVENOUS; SUBCUTANEOUS ONCE
Refills: 0 | Status: COMPLETED | OUTPATIENT
Start: 2020-09-03 | End: 2020-09-03

## 2020-09-03 RX ADMIN — SODIUM CHLORIDE 1000 MILLILITER(S): 9 INJECTION INTRAMUSCULAR; INTRAVENOUS; SUBCUTANEOUS at 00:05

## 2020-09-03 RX ADMIN — Medication 30 MILLIGRAM(S): at 00:06

## 2020-09-03 RX ADMIN — Medication 650 MILLIGRAM(S): at 00:54

## 2020-09-03 RX ADMIN — VALACYCLOVIR 1000 MILLIGRAM(S): 500 TABLET, FILM COATED ORAL at 00:39

## 2020-09-03 RX ADMIN — SODIUM CHLORIDE 1000 MILLILITER(S): 9 INJECTION INTRAMUSCULAR; INTRAVENOUS; SUBCUTANEOUS at 00:40

## 2020-09-03 NOTE — ED ADULT NURSE REASSESSMENT NOTE - NS ED NURSE REASSESS COMMENT FT1
pt still c/o pain .PA aware. pt medicated as ordered. pt still aao x4. respirations even and unlabored. will continue to monitor.

## 2020-09-03 NOTE — ED PROVIDER NOTE - CLINICAL SUMMARY MEDICAL DECISION MAKING FREE TEXT BOX
35m with L lower lip pain after having a tooth extracted this monday. VSS. Pt with + herpetic lesion to L lower lip. Pt also admits to decreased PO intake and urinary out put. Will check labs, hydrate, treat pain, and reassess. Will give dose of valtrex for possible herpes infection.

## 2020-09-03 NOTE — ED PROVIDER NOTE - PATIENT PORTAL LINK FT
You can access the FollowMyHealth Patient Portal offered by [facility] by registering at the following website: [email or web address]. By joining [employer]’s FollowMyHealth portal, you will also be able to view your health information using other applications (apps) compatible with our system.

## 2020-09-03 NOTE — ED PROVIDER NOTE - PROGRESS NOTE DETAILS
PA NOTE: Pt tolerating PO intake and making urine at this time. Will treat with course of PO valtrex. Pt has a follow up appointment with dentist in AM. Advised to continue taking abx as previously prescribed. Educated to return to ED if he develops any new or worsening symptoms ( fevers, vomiting, worsening pain ).

## 2020-09-03 NOTE — ED PROVIDER NOTE - PHYSICAL EXAMINATION
: ( Chaperoned by MD Haynes ) External genitalia WNL. No penile discharge. No rash / lesions.    Skin: Skin color normal for ethnicity. No jaundice appreciated. No lesions.     Mouth: + herpetic lesion L lower lip. No dental abscess appreciated.    GI: Soft, nontender, No rebound / guarding. No perirectal abscess noted. ( chaperoned by MD Haynes )    Vasc: Cap refill < 2 secs

## 2020-09-03 NOTE — ED PROVIDER NOTE - OBJECTIVE STATEMENT
Pt is a 36 y/o m, PMH significant for Pancreatitis, Colitis, Gastritis, and ETOH abuse, presents to ED c/o L sided mouth pain x 3 days. Pt states he had a tooth extracted from his L lower mandible by his dentist this Monday and developed pain and swelling to his L lower lip shortly after. Pt also states he has been having decreased urination since the procedure and states " I feel like my skin is yellow and is blistering". Pt was started on clindamycin 2 weeks ago for a dental infection and had his course extended by his dentist starting today. Pt admits to decreased PO intake secondary to worsening mouth pain. Pt has no other complaints at this time. Denies fevers, chills, SOB, CP, belly pain, palpitations, nausea, vomiting, diarrhea.

## 2020-09-03 NOTE — ED PROVIDER NOTE - ATTENDING CONTRIBUTION TO CARE
36yo male with pmh of Pancreatitis, Colitis, Gastritis, illicit drug use and ETOH abuse, presents with L sided mouth pain x 3 days. Pt states he had a tooth extracted from his L lower mandible by his dentist on Monday and developed pain and swelling to his L lower lip shortly after. Pt also states he has been having decreased urination since the procedure and states " I feel like my skin is yellow and is blistering". Pt was started on clindamycin 2 weeks ago for a dental infection and had his course extended by his dentist starting today. Pt admits to decreased PO intake secondary to worsening mouth pain. Pt denies fevers/chills, ha, loc, focal neuro deficits, cp/sob/palp, cough, abd pain/n/v/d, recent travel and sick contacts.  Const: Awake, alert and oriented. In no acute distress. Well appearing.  HEENT: NC/AT. Dry mucous membranes. left lower lip with blister herpetic like lesion no other lesions notede, midline uvula, no oropharynx erythema/edema/exudates  Eyes: No scleral icterus. EOMI.  Neck:. Soft and supple. Full ROM without pain.  Cardiac: Regular rate and regular rhythm. +S1/S2. Peripheral pulses 2+ and symmetric. No LE edema.  Resp: Speaking in full sentences. No evidence of respiratory distress. No wheezes, rales or rhonchi.  Abd: Soft, non-tender, non-distended. Normal bowel sounds in all 4 quadrants. No guarding or rebound.  Back: Spine midline and non-tender. No CVAT.  Skin: No jaundice, rashes, abrasions or lacerations.  Lymph: No cervical lymphadenopathy.  Neuro: Awake, alert & oriented x 3. Moves all extremities symmetrically.  pt with herpangina, labs wnl, tolerating po, antiviral, stable for dc with follow up

## 2020-09-03 NOTE — ED PROVIDER NOTE - NSFOLLOWUPINSTRUCTIONS_ED_ALL_ED_FT
Complete antibiotics as previously prescribed    Complete full course of valtrex as prescribed     Return to ED if you develop and new or worsening symptoms

## 2020-10-07 ENCOUNTER — EMERGENCY (EMERGENCY)
Facility: HOSPITAL | Age: 35
LOS: 1 days | Discharge: HOME | End: 2020-10-07
Attending: EMERGENCY MEDICINE
Payer: MEDICAID

## 2020-10-07 VITALS
SYSTOLIC BLOOD PRESSURE: 115 MMHG | HEIGHT: 71 IN | TEMPERATURE: 98 F | OXYGEN SATURATION: 98 % | HEART RATE: 70 BPM | WEIGHT: 139.99 LBS | DIASTOLIC BLOOD PRESSURE: 84 MMHG | RESPIRATION RATE: 18 BRPM

## 2020-10-07 LAB
ALBUMIN SERPL ELPH-MCNC: 4.7 G/DL — SIGNIFICANT CHANGE UP (ref 3.3–5.2)
ALP SERPL-CCNC: 79 U/L — SIGNIFICANT CHANGE UP (ref 40–120)
ALT FLD-CCNC: 16 U/L — SIGNIFICANT CHANGE UP
ANION GAP SERPL CALC-SCNC: 20 MMOL/L — HIGH (ref 5–17)
AST SERPL-CCNC: 17 U/L — SIGNIFICANT CHANGE UP
BASOPHILS # BLD AUTO: 0.04 K/UL — SIGNIFICANT CHANGE UP (ref 0–0.2)
BASOPHILS NFR BLD AUTO: 0.3 % — SIGNIFICANT CHANGE UP (ref 0–2)
BILIRUB SERPL-MCNC: 0.3 MG/DL — LOW (ref 0.4–2)
BUN SERPL-MCNC: 14 MG/DL — SIGNIFICANT CHANGE UP (ref 8–20)
CALCIUM SERPL-MCNC: 9.4 MG/DL — SIGNIFICANT CHANGE UP (ref 8.6–10.2)
CHLORIDE SERPL-SCNC: 97 MMOL/L — LOW (ref 98–107)
CO2 SERPL-SCNC: 22 MMOL/L — SIGNIFICANT CHANGE UP (ref 22–29)
CREAT SERPL-MCNC: 0.83 MG/DL — SIGNIFICANT CHANGE UP (ref 0.5–1.3)
EOSINOPHIL # BLD AUTO: 0 K/UL — SIGNIFICANT CHANGE UP (ref 0–0.5)
EOSINOPHIL NFR BLD AUTO: 0 % — SIGNIFICANT CHANGE UP (ref 0–6)
GLUCOSE SERPL-MCNC: 128 MG/DL — HIGH (ref 70–99)
HCT VFR BLD CALC: 46.2 % — SIGNIFICANT CHANGE UP (ref 39–50)
HGB BLD-MCNC: 16.4 G/DL — SIGNIFICANT CHANGE UP (ref 13–17)
IMM GRANULOCYTES NFR BLD AUTO: 0.5 % — SIGNIFICANT CHANGE UP (ref 0–1.5)
LACTATE BLDV-MCNC: 3.5 MMOL/L — HIGH (ref 0.5–2)
LACTATE BLDV-MCNC: 5 MMOL/L — CRITICAL HIGH (ref 0.5–2)
LIDOCAIN IGE QN: 14 U/L — LOW (ref 22–51)
LYMPHOCYTES # BLD AUTO: 0.78 K/UL — LOW (ref 1–3.3)
LYMPHOCYTES # BLD AUTO: 6.2 % — LOW (ref 13–44)
MAGNESIUM SERPL-MCNC: 1.9 MG/DL — SIGNIFICANT CHANGE UP (ref 1.6–2.6)
MCHC RBC-ENTMCNC: 35.4 PG — HIGH (ref 27–34)
MCHC RBC-ENTMCNC: 35.5 GM/DL — SIGNIFICANT CHANGE UP (ref 32–36)
MCV RBC AUTO: 99.8 FL — SIGNIFICANT CHANGE UP (ref 80–100)
MONOCYTES # BLD AUTO: 0.2 K/UL — SIGNIFICANT CHANGE UP (ref 0–0.9)
MONOCYTES NFR BLD AUTO: 1.6 % — LOW (ref 2–14)
NEUTROPHILS # BLD AUTO: 11.56 K/UL — HIGH (ref 1.8–7.4)
NEUTROPHILS NFR BLD AUTO: 91.4 % — HIGH (ref 43–77)
PHOSPHATE SERPL-MCNC: 2.8 MG/DL — SIGNIFICANT CHANGE UP (ref 2.4–4.7)
PLATELET # BLD AUTO: 257 K/UL — SIGNIFICANT CHANGE UP (ref 150–400)
POTASSIUM SERPL-MCNC: 4.3 MMOL/L — SIGNIFICANT CHANGE UP (ref 3.5–5.3)
POTASSIUM SERPL-SCNC: 4.3 MMOL/L — SIGNIFICANT CHANGE UP (ref 3.5–5.3)
PROT SERPL-MCNC: 7.5 G/DL — SIGNIFICANT CHANGE UP (ref 6.6–8.7)
RBC # BLD: 4.63 M/UL — SIGNIFICANT CHANGE UP (ref 4.2–5.8)
RBC # FLD: 12.3 % — SIGNIFICANT CHANGE UP (ref 10.3–14.5)
SODIUM SERPL-SCNC: 139 MMOL/L — SIGNIFICANT CHANGE UP (ref 135–145)
WBC # BLD: 12.64 K/UL — HIGH (ref 3.8–10.5)
WBC # FLD AUTO: 12.64 K/UL — HIGH (ref 3.8–10.5)

## 2020-10-07 PROCEDURE — 83690 ASSAY OF LIPASE: CPT

## 2020-10-07 PROCEDURE — 96374 THER/PROPH/DIAG INJ IV PUSH: CPT | Mod: XU

## 2020-10-07 PROCEDURE — 80053 COMPREHEN METABOLIC PANEL: CPT

## 2020-10-07 PROCEDURE — 76705 ECHO EXAM OF ABDOMEN: CPT

## 2020-10-07 PROCEDURE — 83735 ASSAY OF MAGNESIUM: CPT

## 2020-10-07 PROCEDURE — 83605 ASSAY OF LACTIC ACID: CPT

## 2020-10-07 PROCEDURE — 85025 COMPLETE CBC W/AUTO DIFF WBC: CPT

## 2020-10-07 PROCEDURE — 74177 CT ABD & PELVIS W/CONTRAST: CPT

## 2020-10-07 PROCEDURE — 96376 TX/PRO/DX INJ SAME DRUG ADON: CPT

## 2020-10-07 PROCEDURE — 93005 ELECTROCARDIOGRAM TRACING: CPT

## 2020-10-07 PROCEDURE — 73130 X-RAY EXAM OF HAND: CPT | Mod: 26,LT

## 2020-10-07 PROCEDURE — 74177 CT ABD & PELVIS W/CONTRAST: CPT | Mod: 26

## 2020-10-07 PROCEDURE — 76705 ECHO EXAM OF ABDOMEN: CPT | Mod: 26

## 2020-10-07 PROCEDURE — 84100 ASSAY OF PHOSPHORUS: CPT

## 2020-10-07 PROCEDURE — 36415 COLL VENOUS BLD VENIPUNCTURE: CPT

## 2020-10-07 PROCEDURE — 73130 X-RAY EXAM OF HAND: CPT

## 2020-10-07 PROCEDURE — 99284 EMERGENCY DEPT VISIT MOD MDM: CPT | Mod: 25

## 2020-10-07 PROCEDURE — 93010 ELECTROCARDIOGRAM REPORT: CPT

## 2020-10-07 PROCEDURE — 96375 TX/PRO/DX INJ NEW DRUG ADDON: CPT

## 2020-10-07 PROCEDURE — 99285 EMERGENCY DEPT VISIT HI MDM: CPT

## 2020-10-07 RX ORDER — ONDANSETRON 8 MG/1
1 TABLET, FILM COATED ORAL
Qty: 9 | Refills: 0
Start: 2020-10-07 | End: 2020-10-09

## 2020-10-07 RX ORDER — SODIUM CHLORIDE 9 MG/ML
2000 INJECTION INTRAMUSCULAR; INTRAVENOUS; SUBCUTANEOUS ONCE
Refills: 0 | Status: COMPLETED | OUTPATIENT
Start: 2020-10-07 | End: 2020-10-07

## 2020-10-07 RX ORDER — MORPHINE SULFATE 50 MG/1
4 CAPSULE, EXTENDED RELEASE ORAL ONCE
Refills: 0 | Status: DISCONTINUED | OUTPATIENT
Start: 2020-10-07 | End: 2020-10-07

## 2020-10-07 RX ORDER — FAMOTIDINE 10 MG/ML
20 INJECTION INTRAVENOUS ONCE
Refills: 0 | Status: COMPLETED | OUTPATIENT
Start: 2020-10-07 | End: 2020-10-07

## 2020-10-07 RX ORDER — PANTOPRAZOLE SODIUM 20 MG/1
1 TABLET, DELAYED RELEASE ORAL
Qty: 30 | Refills: 0
Start: 2020-10-07 | End: 2020-11-05

## 2020-10-07 RX ORDER — HYDROMORPHONE HYDROCHLORIDE 2 MG/ML
1 INJECTION INTRAMUSCULAR; INTRAVENOUS; SUBCUTANEOUS ONCE
Refills: 0 | Status: DISCONTINUED | OUTPATIENT
Start: 2020-10-07 | End: 2020-10-07

## 2020-10-07 RX ORDER — ONDANSETRON 8 MG/1
4 TABLET, FILM COATED ORAL ONCE
Refills: 0 | Status: COMPLETED | OUTPATIENT
Start: 2020-10-07 | End: 2020-10-07

## 2020-10-07 RX ORDER — SODIUM CHLORIDE 9 MG/ML
1000 INJECTION INTRAMUSCULAR; INTRAVENOUS; SUBCUTANEOUS ONCE
Refills: 0 | Status: DISCONTINUED | OUTPATIENT
Start: 2020-10-07 | End: 2020-10-07

## 2020-10-07 RX ADMIN — SODIUM CHLORIDE 2000 MILLILITER(S): 9 INJECTION INTRAMUSCULAR; INTRAVENOUS; SUBCUTANEOUS at 09:47

## 2020-10-07 RX ADMIN — ONDANSETRON 4 MILLIGRAM(S): 8 TABLET, FILM COATED ORAL at 09:47

## 2020-10-07 RX ADMIN — Medication 30 MILLILITER(S): at 09:47

## 2020-10-07 RX ADMIN — SODIUM CHLORIDE 2000 MILLILITER(S): 9 INJECTION INTRAMUSCULAR; INTRAVENOUS; SUBCUTANEOUS at 11:42

## 2020-10-07 RX ADMIN — FAMOTIDINE 20 MILLIGRAM(S): 10 INJECTION INTRAVENOUS at 09:47

## 2020-10-07 RX ADMIN — HYDROMORPHONE HYDROCHLORIDE 1 MILLIGRAM(S): 2 INJECTION INTRAMUSCULAR; INTRAVENOUS; SUBCUTANEOUS at 10:03

## 2020-10-07 RX ADMIN — ONDANSETRON 4 MILLIGRAM(S): 8 TABLET, FILM COATED ORAL at 13:17

## 2020-10-07 RX ADMIN — MORPHINE SULFATE 4 MILLIGRAM(S): 50 CAPSULE, EXTENDED RELEASE ORAL at 09:47

## 2020-10-07 NOTE — ED ADULT TRIAGE NOTE - CHIEF COMPLAINT QUOTE
"I woke up at 3am vomiting" c/o abd pain and vomiting starting last night approx 0300, pt reports being dx with pancreatitis. Drank a couple beers last night and starting feeling pain. denies daily ETOH use, denies drug use denies sick contacts. skin warm and dry.

## 2020-10-07 NOTE — ED PROVIDER NOTE - PATIENT PORTAL LINK FT
You can access the FollowMyHealth Patient Portal offered by [facility] by registering at the following website: [email or web address]. By joining [employer]’s FollowMyHealth portal, you will also be able to view your health information using other applications (apps) compatible with our system.

## 2020-10-07 NOTE — ED PROVIDER NOTE - PROGRESS NOTE DETAILS
pt is seen by Dr peres initially agreed with hx , PE and plan   pt is come back from US and CT . feeling better . left ring finger with ecchymosis and swollen States he may jammed last night . ordered xray hand , no wrist or other bony TTP,. ENG NSr . and 2nd  lacate is been drawn lactate is improving from 5to 3.5. most likely due to vomiting and dehydration.  dshemar upton we don't need to repeat the lactate . pt is able tolerated the liquids.   pt has no tremor or shaking . alert and oriented x 3 .  xray of the left hand with possible fracture  mid phalanx . applied splint

## 2020-10-07 NOTE — ED PROVIDER NOTE - CARE PROVIDERS DIRECT ADDRESSES
,[email or web address],[email or web address]

## 2020-10-07 NOTE — ED PROCEDURE NOTE - NS ED ATTENDING STATEMENT MOD
I have personally seen and examined this patient.  I have fully participated in the care of this patient. I have reviewed all pertinent clinical information, including history, physical exam, plan and the Resident’s note and agree except as noted.
I have personally performed a face to face diagnostic evaluation on this patient. I have reviewed the ACP note and agree with the history, exam and plan of care, except as noted.

## 2020-10-07 NOTE — ED ADULT NURSE NOTE - NSIMPLEMENTINTERV_GEN_ALL_ED
Implemented All Universal Safety Interventions:  [place] to call system. Call bell, personal items and telephone within reach. Instruct patient to call for assistance. Room bathroom lighting operational. Non-slip footwear when patient is off stretcher. Physically safe environment: no spills, clutter or unnecessary equipment. Stretcher in lowest position, wheels locked, appropriate side rails in place.

## 2020-10-07 NOTE — ED PROVIDER NOTE - CARE PROVIDER_API CALL
Dk Alonzo  GASTROENTEROLOGY  [address], [address]  [place] [address]  Phone: [phone]  Fax: [phone]  Follow Up Time:     Naseem Marie  PLASTIC SURGERY  [address] [place] [address], [address]  [place] [address]  Phone: [phone]  Fax: [phone]  Follow Up Time:

## 2020-10-07 NOTE — ED PROVIDER NOTE - CLINICAL SUMMARY MEDICAL DECISION MAKING FREE TEXT BOX
36yo male with suspected pancreatitis vs colitis, likely alcohol-induced, labs, analgesia, fluids, CT a/p, reassess. Carolin Moreno DO

## 2020-10-07 NOTE — ED PROCEDURE NOTE - ATTENDING CONTRIBUTION TO CARE
I have participated in and supervised all key portions of the above procedures and agree with the above documentation.
Splint applied by REZA Worthy. Carolin Moreno DO

## 2020-10-07 NOTE — ED PROVIDER NOTE - NSFOLLOWUPINSTRUCTIONS_ED_ALL_ED_FT
please do not remove the splint and keep it intact / please call and follow up with primary hand specialist   please call and follow up with gastroenterology  please drink plenty fluids. avoid heavy drinking   come back in ER if any worsen the pain , abdominal pain , nausea and vomiting that dose not get better with meds or any new concern    please follow up In surgery clinic for gallbladder slugged   Tylenol over the counter as need it for  pain     Gastritis    Gastritis is soreness and swelling (inflammation) of the lining of the stomach. Gastritis can develop as a sudden onset (acute) or long-term (chronic) condition. If gastritis is not treated, it can lead to stomach bleeding and ulcers. Causes include viral and bacterial infections, excessive alcohol consumption, tobacco use, or certain medications. Symptoms include nausea, vomiting, or abdominal pain or burning especially after eating. Avoid foods or drinks that make your symptoms worse such as caffeine, chocolate, spicy foods, acidic foods, or alcohol.    SEEK IMMEDIATE MEDICAL CARE IF YOU HAVE ANY OF THE FOLLOWING SYMPTOMS: black or bloody stools, blood or coffee-ground-colored vomitus, worsening abdominal pain, fever, or inability to keep fluids down.    Alcohol Abuse    Alcohol intoxication occurs when the amount of alcohol that a person has consumed impairs his or her ability to mentally and physically function. Chronic alcohol consumption can also lead to a variety of health issues including neurological disease, stomach disease, heart disease, liver disease, etc. Do not drive after drinking alcohol. Drinking enough alcohol to end up in an Emergency Room suggests you may have an alcohol abuse problem. Seek help at a drug addiction center.    SEEK IMMEDIATE MEDICAL CARE IF YOU HAVE ANY OF THE FOLLOWING SYMPTOMS: seizures, vomiting blood, blood in your stool, lightheadedness/dizziness, or becoming shaky to tremulous when you stop drinking.

## 2020-10-07 NOTE — ED PROVIDER NOTE - ATTENDING CONTRIBUTION TO CARE
I, Carolin Moreno, performed a face to face bedside interview with this patient regarding history of present illness, review of symptoms and relevant past medical, social and family history.  I completed an independent physical examination. Medical decision making, follow-up on ordered tests (ie labs, radiologic studies) and re-evaluation of the patient's status has been communicated to the ACP.  Disposition of the patient will be based on test outcome and response to ED interventions.

## 2020-10-07 NOTE — ED PROVIDER NOTE - NSFOLLOWUPCLINICS_GEN_ALL_ED_FT
[facility] General Surgery  Surgery  [address] [place] [address]  Phone: [phone]  Fax:   Follow Up Time:

## 2020-10-07 NOTE — ED PROVIDER NOTE - CARE PLAN
Principal Discharge DX:	Gastritis, presence of bleeding unspecified, unspecified chronicity, unspecified gastritis type  Secondary Diagnosis:	Closed displaced fracture of middle phalanx of left ring finger, initial encounter  Secondary Diagnosis:	Abdominal pain, unspecified abdominal location

## 2020-10-07 NOTE — ED PROVIDER NOTE - OBJECTIVE STATEMENT
34yo male with PMH pancreatitis, colitis, gastritis, EtOH abuse presenting with diffuse abdominal pain, vomiting, and diarrhea that started 6 hours prior to arrival. +chills, no fevers. Patient states it feels like his prior pancreatitis flares. Patient states that he has been sober for last several months but last night he drank a lot of alcohol, unable to quantify how much. States he vomited "hundreds of times" and that it is just watery, nonbloody. No h/o abdominal surgeries.

## 2020-10-07 NOTE — ED PROVIDER NOTE - PHYSICAL EXAMINATION
Gen: uncomfortable appearing, awake, alert  Head: NCAT  HEENT: oral mucosa dry, normal conjunctiva, oropharynx clear without exudate or erythema  Lung: CTAB, no respiratory distress, no wheezing, rales, rhonchi  CV: normal s1/s2, rrr, no murmurs, Normal perfusion, pulses 2+ throughout  Abd: soft, diffusely tender to palpation throughout abdomen no guarding/rigidity no rebound tenderness/guarding  MSK: No edema, no visible deformities, full range of motion in all 4 extremities  Neuro: No focal neurologic deficits  Skin: No rash   Psych: normal affect

## 2020-10-07 NOTE — ED ADULT NURSE NOTE - OBJECTIVE STATEMENT
patient c/o abdominal pain, hx of pancreatitis. patient is stating it feels similar because he drank last night.

## 2020-10-07 NOTE — ED PROVIDER NOTE - SECONDARY DIAGNOSIS.
Closed displaced fracture of middle phalanx of left ring finger, initial encounter Abdominal pain, unspecified abdominal location

## 2020-11-09 ENCOUNTER — EMERGENCY (EMERGENCY)
Facility: HOSPITAL | Age: 35
LOS: 1 days | Discharge: HOME | End: 2020-11-09
Attending: EMERGENCY MEDICINE
Payer: MEDICAID

## 2020-11-09 VITALS
OXYGEN SATURATION: 99 % | SYSTOLIC BLOOD PRESSURE: 128 MMHG | HEART RATE: 89 BPM | RESPIRATION RATE: 18 BRPM | DIASTOLIC BLOOD PRESSURE: 78 MMHG

## 2020-11-09 VITALS
DIASTOLIC BLOOD PRESSURE: 85 MMHG | OXYGEN SATURATION: 96 % | HEART RATE: 60 BPM | RESPIRATION RATE: 18 BRPM | HEIGHT: 71 IN | SYSTOLIC BLOOD PRESSURE: 129 MMHG | TEMPERATURE: 98 F | WEIGHT: 175.05 LBS

## 2020-11-09 VITALS
HEART RATE: 57 BPM | SYSTOLIC BLOOD PRESSURE: 133 MMHG | DIASTOLIC BLOOD PRESSURE: 82 MMHG | TEMPERATURE: 98 F | OXYGEN SATURATION: 95 % | RESPIRATION RATE: 18 BRPM | HEIGHT: 71 IN

## 2020-11-09 VITALS
SYSTOLIC BLOOD PRESSURE: 131 MMHG | HEART RATE: 68 BPM | OXYGEN SATURATION: 98 % | RESPIRATION RATE: 18 BRPM | TEMPERATURE: 98 F | DIASTOLIC BLOOD PRESSURE: 84 MMHG

## 2020-11-09 LAB
ALBUMIN SERPL ELPH-MCNC: 4.3 G/DL — SIGNIFICANT CHANGE UP (ref 3.3–5.2)
ALBUMIN SERPL ELPH-MCNC: 4.9 G/DL — SIGNIFICANT CHANGE UP (ref 3.3–5.2)
ALP SERPL-CCNC: 66 U/L — SIGNIFICANT CHANGE UP (ref 40–120)
ALP SERPL-CCNC: 74 U/L — SIGNIFICANT CHANGE UP (ref 40–120)
ALT FLD-CCNC: 47 U/L — HIGH
ALT FLD-CCNC: 59 U/L — HIGH
ANION GAP SERPL CALC-SCNC: 13 MMOL/L — SIGNIFICANT CHANGE UP (ref 5–17)
ANION GAP SERPL CALC-SCNC: 19 MMOL/L — HIGH (ref 5–17)
APPEARANCE UR: CLEAR — SIGNIFICANT CHANGE UP
APTT BLD: 31.6 SEC — SIGNIFICANT CHANGE UP (ref 27.5–35.5)
AST SERPL-CCNC: 38 U/L — SIGNIFICANT CHANGE UP
AST SERPL-CCNC: 44 U/L — HIGH
BASOPHILS # BLD AUTO: 0.02 K/UL — SIGNIFICANT CHANGE UP (ref 0–0.2)
BASOPHILS # BLD AUTO: 0.04 K/UL — SIGNIFICANT CHANGE UP (ref 0–0.2)
BASOPHILS NFR BLD AUTO: 0.2 % — SIGNIFICANT CHANGE UP (ref 0–2)
BASOPHILS NFR BLD AUTO: 0.3 % — SIGNIFICANT CHANGE UP (ref 0–2)
BILIRUB SERPL-MCNC: 0.4 MG/DL — SIGNIFICANT CHANGE UP (ref 0.4–2)
BILIRUB SERPL-MCNC: 0.5 MG/DL — SIGNIFICANT CHANGE UP (ref 0.4–2)
BILIRUB UR-MCNC: NEGATIVE — SIGNIFICANT CHANGE UP
BLD GP AB SCN SERPL QL: SIGNIFICANT CHANGE UP
BUN SERPL-MCNC: 10 MG/DL — SIGNIFICANT CHANGE UP (ref 8–20)
BUN SERPL-MCNC: 13 MG/DL — SIGNIFICANT CHANGE UP (ref 8–20)
CALCIUM SERPL-MCNC: 9.2 MG/DL — SIGNIFICANT CHANGE UP (ref 8.6–10.2)
CALCIUM SERPL-MCNC: 9.8 MG/DL — SIGNIFICANT CHANGE UP (ref 8.6–10.2)
CHLORIDE SERPL-SCNC: 101 MMOL/L — SIGNIFICANT CHANGE UP (ref 98–107)
CHLORIDE SERPL-SCNC: 97 MMOL/L — LOW (ref 98–107)
CO2 SERPL-SCNC: 25 MMOL/L — SIGNIFICANT CHANGE UP (ref 22–29)
CO2 SERPL-SCNC: 25 MMOL/L — SIGNIFICANT CHANGE UP (ref 22–29)
COLOR SPEC: YELLOW — SIGNIFICANT CHANGE UP
CREAT SERPL-MCNC: 0.6 MG/DL — SIGNIFICANT CHANGE UP (ref 0.5–1.3)
CREAT SERPL-MCNC: 0.73 MG/DL — SIGNIFICANT CHANGE UP (ref 0.5–1.3)
DIFF PNL FLD: NEGATIVE — SIGNIFICANT CHANGE UP
EOSINOPHIL # BLD AUTO: 0.01 K/UL — SIGNIFICANT CHANGE UP (ref 0–0.5)
EOSINOPHIL # BLD AUTO: 0.01 K/UL — SIGNIFICANT CHANGE UP (ref 0–0.5)
EOSINOPHIL NFR BLD AUTO: 0.1 % — SIGNIFICANT CHANGE UP (ref 0–6)
EOSINOPHIL NFR BLD AUTO: 0.1 % — SIGNIFICANT CHANGE UP (ref 0–6)
EPI CELLS # UR: SIGNIFICANT CHANGE UP
ETHANOL SERPL-MCNC: 92 MG/DL — HIGH (ref 0–9)
GLUCOSE SERPL-MCNC: 132 MG/DL — HIGH (ref 70–99)
GLUCOSE SERPL-MCNC: 97 MG/DL — SIGNIFICANT CHANGE UP (ref 70–99)
GLUCOSE UR QL: NEGATIVE MG/DL — SIGNIFICANT CHANGE UP
HCT VFR BLD CALC: 40 % — SIGNIFICANT CHANGE UP (ref 39–50)
HCT VFR BLD CALC: 44.4 % — SIGNIFICANT CHANGE UP (ref 39–50)
HGB BLD-MCNC: 14.5 G/DL — SIGNIFICANT CHANGE UP (ref 13–17)
HGB BLD-MCNC: 16.1 G/DL — SIGNIFICANT CHANGE UP (ref 13–17)
IMM GRANULOCYTES NFR BLD AUTO: 0.4 % — SIGNIFICANT CHANGE UP (ref 0–1.5)
IMM GRANULOCYTES NFR BLD AUTO: 0.4 % — SIGNIFICANT CHANGE UP (ref 0–1.5)
INR BLD: 1.06 RATIO — SIGNIFICANT CHANGE UP (ref 0.88–1.16)
KETONES UR-MCNC: ABNORMAL
LEUKOCYTE ESTERASE UR-ACNC: NEGATIVE — SIGNIFICANT CHANGE UP
LIDOCAIN IGE QN: 17 U/L — LOW (ref 22–51)
LIDOCAIN IGE QN: 26 U/L — SIGNIFICANT CHANGE UP (ref 22–51)
LYMPHOCYTES # BLD AUTO: 0.89 K/UL — LOW (ref 1–3.3)
LYMPHOCYTES # BLD AUTO: 1.15 K/UL — SIGNIFICANT CHANGE UP (ref 1–3.3)
LYMPHOCYTES # BLD AUTO: 6.3 % — LOW (ref 13–44)
LYMPHOCYTES # BLD AUTO: 9.6 % — LOW (ref 13–44)
MCHC RBC-ENTMCNC: 35.2 PG — HIGH (ref 27–34)
MCHC RBC-ENTMCNC: 35.2 PG — HIGH (ref 27–34)
MCHC RBC-ENTMCNC: 36.3 GM/DL — HIGH (ref 32–36)
MCHC RBC-ENTMCNC: 36.3 GM/DL — HIGH (ref 32–36)
MCV RBC AUTO: 96.9 FL — SIGNIFICANT CHANGE UP (ref 80–100)
MCV RBC AUTO: 97.1 FL — SIGNIFICANT CHANGE UP (ref 80–100)
MONOCYTES # BLD AUTO: 0.32 K/UL — SIGNIFICANT CHANGE UP (ref 0–0.9)
MONOCYTES # BLD AUTO: 0.89 K/UL — SIGNIFICANT CHANGE UP (ref 0–0.9)
MONOCYTES NFR BLD AUTO: 2.2 % — SIGNIFICANT CHANGE UP (ref 2–14)
MONOCYTES NFR BLD AUTO: 7.4 % — SIGNIFICANT CHANGE UP (ref 2–14)
NEUTROPHILS # BLD AUTO: 12.91 K/UL — HIGH (ref 1.8–7.4)
NEUTROPHILS # BLD AUTO: 9.85 K/UL — HIGH (ref 1.8–7.4)
NEUTROPHILS NFR BLD AUTO: 82.3 % — HIGH (ref 43–77)
NEUTROPHILS NFR BLD AUTO: 90.7 % — HIGH (ref 43–77)
NITRITE UR-MCNC: NEGATIVE — SIGNIFICANT CHANGE UP
PH UR: 7 — SIGNIFICANT CHANGE UP (ref 5–8)
PLATELET # BLD AUTO: 259 K/UL — SIGNIFICANT CHANGE UP (ref 150–400)
PLATELET # BLD AUTO: 312 K/UL — SIGNIFICANT CHANGE UP (ref 150–400)
POTASSIUM SERPL-MCNC: 3.4 MMOL/L — LOW (ref 3.5–5.3)
POTASSIUM SERPL-MCNC: 3.7 MMOL/L — SIGNIFICANT CHANGE UP (ref 3.5–5.3)
POTASSIUM SERPL-SCNC: 3.4 MMOL/L — LOW (ref 3.5–5.3)
POTASSIUM SERPL-SCNC: 3.7 MMOL/L — SIGNIFICANT CHANGE UP (ref 3.5–5.3)
PROT SERPL-MCNC: 6.8 G/DL — SIGNIFICANT CHANGE UP (ref 6.6–8.7)
PROT SERPL-MCNC: 7.8 G/DL — SIGNIFICANT CHANGE UP (ref 6.6–8.7)
PROT UR-MCNC: 15 MG/DL
PROTHROM AB SERPL-ACNC: 12.3 SEC — SIGNIFICANT CHANGE UP (ref 10.6–13.6)
RBC # BLD: 4.12 M/UL — LOW (ref 4.2–5.8)
RBC # BLD: 4.58 M/UL — SIGNIFICANT CHANGE UP (ref 4.2–5.8)
RBC # FLD: 11.5 % — SIGNIFICANT CHANGE UP (ref 10.3–14.5)
RBC # FLD: 11.6 % — SIGNIFICANT CHANGE UP (ref 10.3–14.5)
RBC CASTS # UR COMP ASSIST: SIGNIFICANT CHANGE UP /HPF (ref 0–4)
SODIUM SERPL-SCNC: 139 MMOL/L — SIGNIFICANT CHANGE UP (ref 135–145)
SODIUM SERPL-SCNC: 141 MMOL/L — SIGNIFICANT CHANGE UP (ref 135–145)
SP GR SPEC: 1.01 — SIGNIFICANT CHANGE UP (ref 1.01–1.02)
UROBILINOGEN FLD QL: NEGATIVE MG/DL — SIGNIFICANT CHANGE UP
WBC # BLD: 11.97 K/UL — HIGH (ref 3.8–10.5)
WBC # BLD: 14.23 K/UL — HIGH (ref 3.8–10.5)
WBC # FLD AUTO: 11.97 K/UL — HIGH (ref 3.8–10.5)
WBC # FLD AUTO: 14.23 K/UL — HIGH (ref 3.8–10.5)
WBC UR QL: SIGNIFICANT CHANGE UP

## 2020-11-09 PROCEDURE — 96375 TX/PRO/DX INJ NEW DRUG ADDON: CPT

## 2020-11-09 PROCEDURE — 93005 ELECTROCARDIOGRAM TRACING: CPT

## 2020-11-09 PROCEDURE — 36415 COLL VENOUS BLD VENIPUNCTURE: CPT

## 2020-11-09 PROCEDURE — 86850 RBC ANTIBODY SCREEN: CPT

## 2020-11-09 PROCEDURE — 93010 ELECTROCARDIOGRAM REPORT: CPT

## 2020-11-09 PROCEDURE — 99284 EMERGENCY DEPT VISIT MOD MDM: CPT | Mod: 25

## 2020-11-09 PROCEDURE — 85610 PROTHROMBIN TIME: CPT

## 2020-11-09 PROCEDURE — 71045 X-RAY EXAM CHEST 1 VIEW: CPT

## 2020-11-09 PROCEDURE — 96361 HYDRATE IV INFUSION ADD-ON: CPT

## 2020-11-09 PROCEDURE — 86901 BLOOD TYPING SEROLOGIC RH(D): CPT

## 2020-11-09 PROCEDURE — 99285 EMERGENCY DEPT VISIT HI MDM: CPT

## 2020-11-09 PROCEDURE — 74177 CT ABD & PELVIS W/CONTRAST: CPT

## 2020-11-09 PROCEDURE — 85025 COMPLETE CBC W/AUTO DIFF WBC: CPT

## 2020-11-09 PROCEDURE — 96374 THER/PROPH/DIAG INJ IV PUSH: CPT | Mod: XU

## 2020-11-09 PROCEDURE — 74177 CT ABD & PELVIS W/CONTRAST: CPT | Mod: 26

## 2020-11-09 PROCEDURE — 80307 DRUG TEST PRSMV CHEM ANLYZR: CPT

## 2020-11-09 PROCEDURE — 85730 THROMBOPLASTIN TIME PARTIAL: CPT

## 2020-11-09 PROCEDURE — 71045 X-RAY EXAM CHEST 1 VIEW: CPT | Mod: 26

## 2020-11-09 PROCEDURE — 80053 COMPREHEN METABOLIC PANEL: CPT

## 2020-11-09 PROCEDURE — 96374 THER/PROPH/DIAG INJ IV PUSH: CPT

## 2020-11-09 PROCEDURE — 83690 ASSAY OF LIPASE: CPT

## 2020-11-09 PROCEDURE — 81001 URINALYSIS AUTO W/SCOPE: CPT

## 2020-11-09 PROCEDURE — 96376 TX/PRO/DX INJ SAME DRUG ADON: CPT

## 2020-11-09 PROCEDURE — 86900 BLOOD TYPING SEROLOGIC ABO: CPT

## 2020-11-09 RX ORDER — MORPHINE SULFATE 50 MG/1
4 CAPSULE, EXTENDED RELEASE ORAL ONCE
Refills: 0 | Status: DISCONTINUED | OUTPATIENT
Start: 2020-11-09 | End: 2020-11-09

## 2020-11-09 RX ORDER — ONDANSETRON 8 MG/1
1 TABLET, FILM COATED ORAL
Qty: 6 | Refills: 0
Start: 2020-11-09 | End: 2020-11-10

## 2020-11-09 RX ORDER — SODIUM CHLORIDE 9 MG/ML
1000 INJECTION INTRAMUSCULAR; INTRAVENOUS; SUBCUTANEOUS ONCE
Refills: 0 | Status: COMPLETED | OUTPATIENT
Start: 2020-11-09 | End: 2020-11-09

## 2020-11-09 RX ORDER — ONDANSETRON 8 MG/1
4 TABLET, FILM COATED ORAL ONCE
Refills: 0 | Status: COMPLETED | OUTPATIENT
Start: 2020-11-09 | End: 2020-11-09

## 2020-11-09 RX ORDER — HALOPERIDOL DECANOATE 100 MG/ML
5 INJECTION INTRAMUSCULAR ONCE
Refills: 0 | Status: COMPLETED | OUTPATIENT
Start: 2020-11-09 | End: 2020-11-09

## 2020-11-09 RX ORDER — HYDROMORPHONE HYDROCHLORIDE 2 MG/ML
1 INJECTION INTRAMUSCULAR; INTRAVENOUS; SUBCUTANEOUS ONCE
Refills: 0 | Status: DISCONTINUED | OUTPATIENT
Start: 2020-11-09 | End: 2020-11-09

## 2020-11-09 RX ORDER — FAMOTIDINE 10 MG/ML
20 INJECTION INTRAVENOUS ONCE
Refills: 0 | Status: COMPLETED | OUTPATIENT
Start: 2020-11-09 | End: 2020-11-09

## 2020-11-09 RX ADMIN — ONDANSETRON 4 MILLIGRAM(S): 8 TABLET, FILM COATED ORAL at 08:05

## 2020-11-09 RX ADMIN — MORPHINE SULFATE 4 MILLIGRAM(S): 50 CAPSULE, EXTENDED RELEASE ORAL at 16:24

## 2020-11-09 RX ADMIN — Medication 30 MILLILITER(S): at 08:37

## 2020-11-09 RX ADMIN — ONDANSETRON 4 MILLIGRAM(S): 8 TABLET, FILM COATED ORAL at 09:40

## 2020-11-09 RX ADMIN — MORPHINE SULFATE 4 MILLIGRAM(S): 50 CAPSULE, EXTENDED RELEASE ORAL at 17:48

## 2020-11-09 RX ADMIN — SODIUM CHLORIDE 1000 MILLILITER(S): 9 INJECTION INTRAMUSCULAR; INTRAVENOUS; SUBCUTANEOUS at 09:41

## 2020-11-09 RX ADMIN — SODIUM CHLORIDE 1000 MILLILITER(S): 9 INJECTION INTRAMUSCULAR; INTRAVENOUS; SUBCUTANEOUS at 17:48

## 2020-11-09 RX ADMIN — SODIUM CHLORIDE 1000 MILLILITER(S): 9 INJECTION INTRAMUSCULAR; INTRAVENOUS; SUBCUTANEOUS at 16:24

## 2020-11-09 RX ADMIN — ONDANSETRON 4 MILLIGRAM(S): 8 TABLET, FILM COATED ORAL at 16:24

## 2020-11-09 RX ADMIN — SODIUM CHLORIDE 1000 MILLILITER(S): 9 INJECTION INTRAMUSCULAR; INTRAVENOUS; SUBCUTANEOUS at 08:05

## 2020-11-09 RX ADMIN — HYDROMORPHONE HYDROCHLORIDE 1 MILLIGRAM(S): 2 INJECTION INTRAMUSCULAR; INTRAVENOUS; SUBCUTANEOUS at 18:07

## 2020-11-09 RX ADMIN — FAMOTIDINE 20 MILLIGRAM(S): 10 INJECTION INTRAVENOUS at 17:48

## 2020-11-09 RX ADMIN — MORPHINE SULFATE 4 MILLIGRAM(S): 50 CAPSULE, EXTENDED RELEASE ORAL at 08:05

## 2020-11-09 RX ADMIN — HALOPERIDOL DECANOATE 5 MILLIGRAM(S): 100 INJECTION INTRAMUSCULAR at 17:45

## 2020-11-09 RX ADMIN — HYDROMORPHONE HYDROCHLORIDE 1 MILLIGRAM(S): 2 INJECTION INTRAMUSCULAR; INTRAVENOUS; SUBCUTANEOUS at 17:48

## 2020-11-09 RX ADMIN — MORPHINE SULFATE 4 MILLIGRAM(S): 50 CAPSULE, EXTENDED RELEASE ORAL at 08:37

## 2020-11-09 NOTE — ED PROVIDER NOTE - PHYSICAL EXAMINATION
Gen: Well appearing in moderate distress 2/2 pain/vomiting/retching  Head: NC/AT  Neck: trachea midline  Resp:  No distress, CTAB  CV: RRR  GI: patient screaming in pain when palpated, is non-tender when distracted.   Ext: no deformities  Neuro:  A&O appears non focal  Skin:  Warm and dry as visualized  Psych:  Normal affect and mood

## 2020-11-09 NOTE — ED PROVIDER NOTE - CARE PROVIDER_API CALL
Sandor Lopez  GASTROENTEROLOGY  [address] [facility]  [place] [address]  Phone: [phone]  Fax: [phone]  Follow Up Time: 1-3 Days

## 2020-11-09 NOTE — ED PROVIDER NOTE - PROGRESS NOTE DETAILS
Workup unremarkable. Patient is feeling improved. Medication prescribed.  Return precautions given.  Follow up with PCM.  All questions answered and patient agreeable to the plan.

## 2020-11-09 NOTE — ED PROVIDER NOTE - CLINICAL SUMMARY MEDICAL DECISION MAKING FREE TEXT BOX
35M h/o ETOH abuse, gastritis, pancreatitis, PUD p/w generalized abd pain radiating from LLQ since 4am a/w multiple episodes of emesis, returns to ED due to uncontrolled pain/vomiting.  Will treat and get basic labs.

## 2020-11-09 NOTE — ED PROVIDER NOTE - PATIENT PORTAL LINK FT
You can access the FollowMyHealth Patient Portal offered by [facility] by registering at the following website: [email or web address]. By joining [employer]’s FollowMyHealth portal, you will also be able to view your health information using other applications (apps) compatible with our system.

## 2020-11-09 NOTE — ED PROVIDER NOTE - OBJECTIVE STATEMENT
35M h/o ETOH abuse, gastritis, pancreatitis, PUD p/w generalized abd pain radiating from LLQ since 4am a/w multiple episodes of emesis. Originally NBNB emesis and then with red blood. +diarrhea. Denies fever, drug use, CP. No known history of varices. Has been drinking alcohol last 2 days.

## 2020-11-09 NOTE — ED PROVIDER NOTE - ATTENDING CONTRIBUTION TO CARE
The patient seen and examined    Vomiting-Resolved    I, Polo Donaldson, performed the initial face to face bedside interview with this patient regarding history of present illness, review of symptoms and relevant past medical, social and family history.  I completed an independent physical examination.  I was the initial provider who evaluated this patient. I have signed out the follow up of any pending tests (i.e. labs, radiological studies) to the resident.  I have communicated the patient’s plan of care and disposition with the resident

## 2020-11-09 NOTE — ED ADULT NURSE NOTE - OBJECTIVE STATEMENT
assumed care at 0730am. pt. awake, crying, yelling in pain. pt. states he has "pancreatitis" pt. states he has been drinking for the past x2 days. pt. c/o dark bloody vomit and generalized abdominal pain starting at 4am. pt. c/o bloody stool. MD. Mikey at bedside to evaluate pt.

## 2020-11-09 NOTE — ED ADULT NURSE NOTE - OBJECTIVE STATEMENT
Pt who was treated and released yesterday for same complaint, c/o abdominal pain that radiates to back with mild nausea.

## 2020-11-09 NOTE — ED PROVIDER NOTE - PATIENT PORTAL LINK FT
You can access the FollowMyHealth Patient Portal offered by [facility] by registering at the following website: [email or web address]. By joining [employer]’s FollowMyHealth portal, you will also be able to view your health information using other applications (apps) compatible with our system.

## 2020-11-09 NOTE — ED PROVIDER NOTE - PHYSICAL EXAMINATION
General: NAD, well appearing  HEENT: Normocephalic, EOM intact  Neck: No apparent stiffness or JVD  Pulm: Chest wall symmetric and nontender, lungs clear to ascultation   Cardiac: Regular rate and regular rhythm  Abdomen: Nontender and nondistended  Skin: Skin in warm, dry and intact without rashes or lesions.  Neuro: No apparent motor or sensory deficits  Psych: Alert, oriented, and cooperative

## 2020-11-09 NOTE — ED PROVIDER NOTE - NSFOLLOWUPINSTRUCTIONS_ED_ALL_ED_FT
- Follow up with your doctor within 2-3 days.   - Follow up with Gastroenterology, see information above.   - Bring results with you to the appointment.   - Return to the ED for any new or worsening symptoms.    Abdominal Pain    Many things can cause abdominal pain. Many times, abdominal pain is not caused by a disease and will improve without treatment. Your health care provider will do a physical exam to determine if there is a dangerous cause of your pain; blood tests and imaging may help determine the cause of your pain. However, in many cases, no cause may be found and you may need further testing as an outpatient. Monitor your abdominal pain for any changes.     SEEK IMMEDIATE MEDICAL CARE IF YOU HAVE ANY OF THE FOLLOWING SYMPTOMS: worsening abdominal pain, uncontrollable vomiting, profuse diarrhea, inability to have bowel movements or pass gas, black or bloody stools, fever accompanying chest pain or back pain, or fainting. These symptoms may represent a serious problem that is an emergency. Do not wait to see if the symptoms will go away. Get medical help right away. Call 911 and do not drive yourself to the hospital.

## 2020-11-09 NOTE — ED ADULT NURSE NOTE - NSIMPLEMENTINTERV_GEN_ALL_ED
Implemented All Universal Safety Interventions:  [place] to call system. Call bell, personal items and telephone within reach. Instruct patient to call for assistance. Room bathroom lighting operational. Non-slip footwear when patient is off stretcher. Physically safe environment: no spills, clutter or unnecessary equipment. Stretcher in lowest position, wheels locked, appropriate side rails in place.

## 2020-11-09 NOTE — ED ADULT TRIAGE NOTE - CHIEF COMPLAINT QUOTE
pt BIBA from home a&ox3, no acute distress, breaths even and unlabored, c/o LLQ abdominal pain and vomiting since 12am. reports drinks alcohol everyday. reports last drink was 8pm last night, when he drinks heavily it makes his pancreatitis act up. actively vomiting in triage.

## 2020-11-09 NOTE — ED PROVIDER NOTE - NS ED ROS FT
General: No fever, no massive bleeding  Head: No HA, no ongoing scalp bleed  ENT: no neck pain, +sore throat  Resp: No SOB, no hemoptysis  Cardiovascular: No CP, No LOC  GI: +abdominal pain, No blood in stool  : No dysuria, no hematuria   MSK: No back pain, no limb pain  Skin: No painful or bleeding lesions  Neuro: No paresthesias, No focal deficits

## 2020-11-09 NOTE — ED ADULT TRIAGE NOTE - CHIEF COMPLAINT QUOTE
Patient arrived to ED today with c/o abdominal pains, vomiting, at times vomiting blood now. Patient reports for the past 14 years of his life he has "attacks" or "episodes" like this.  Patient was in ED today at 123 and released, CT negative.

## 2020-11-09 NOTE — ED PROVIDER NOTE - OBJECTIVE STATEMENT
35M  hx ETOH abuse, gastritis, pancreatitis, PUD p/w generalized abd pain radiating from LLQ since with nausea / bloody and nonbloody emesis.  Patient admits to drinking last night which causes this problem. Patient was in ED earlier today and had unremarkable workup and was sent home.  Patient returned to ED due to worsening pain and nausea/vomiting.

## 2020-11-09 NOTE — ED ADULT NURSE NOTE - NSIMPLEMENTINTERV_GEN_ALL_ED
Implemented All Universal Safety Interventions:  [place] to call system. Call bell, personal items and telephone within reach. Instruct patient to call for assistance. Room bathroom lighting operational. Non-slip footwear when patient is off stretcher. Physically safe environment: no spills, clutter or unnecessary equipment. Stretcher in lowest position, wheels locked, appropriate side rails in place.

## 2020-11-10 ENCOUNTER — EMERGENCY (EMERGENCY)
Facility: HOSPITAL | Age: 35
LOS: 1 days | Discharge: HOME | End: 2020-11-10
Attending: STUDENT IN AN ORGANIZED HEALTH CARE EDUCATION/TRAINING PROGRAM
Payer: MEDICAID

## 2020-11-10 VITALS
OXYGEN SATURATION: 97 % | RESPIRATION RATE: 16 BRPM | WEIGHT: 160.06 LBS | DIASTOLIC BLOOD PRESSURE: 72 MMHG | TEMPERATURE: 98 F | HEART RATE: 82 BPM | HEIGHT: 71 IN | SYSTOLIC BLOOD PRESSURE: 124 MMHG

## 2020-11-10 VITALS
RESPIRATION RATE: 16 BRPM | OXYGEN SATURATION: 97 % | SYSTOLIC BLOOD PRESSURE: 121 MMHG | TEMPERATURE: 98 F | DIASTOLIC BLOOD PRESSURE: 74 MMHG | HEART RATE: 78 BPM

## 2020-11-10 LAB
ALBUMIN SERPL ELPH-MCNC: 3.9 G/DL — SIGNIFICANT CHANGE UP (ref 3.3–5.2)
ALP SERPL-CCNC: 56 U/L — SIGNIFICANT CHANGE UP (ref 40–120)
ALT FLD-CCNC: 66 U/L — HIGH
ANION GAP SERPL CALC-SCNC: 10 MMOL/L — SIGNIFICANT CHANGE UP (ref 5–17)
APPEARANCE UR: CLEAR — SIGNIFICANT CHANGE UP
AST SERPL-CCNC: 46 U/L — HIGH
BASOPHILS # BLD AUTO: 0.05 K/UL — SIGNIFICANT CHANGE UP (ref 0–0.2)
BASOPHILS NFR BLD AUTO: 0.5 % — SIGNIFICANT CHANGE UP (ref 0–2)
BILIRUB SERPL-MCNC: 0.5 MG/DL — SIGNIFICANT CHANGE UP (ref 0.4–2)
BILIRUB UR-MCNC: NEGATIVE — SIGNIFICANT CHANGE UP
BUN SERPL-MCNC: 12 MG/DL — SIGNIFICANT CHANGE UP (ref 8–20)
CALCIUM SERPL-MCNC: 8.7 MG/DL — SIGNIFICANT CHANGE UP (ref 8.6–10.2)
CHLORIDE SERPL-SCNC: 101 MMOL/L — SIGNIFICANT CHANGE UP (ref 98–107)
CO2 SERPL-SCNC: 28 MMOL/L — SIGNIFICANT CHANGE UP (ref 22–29)
COLOR SPEC: YELLOW — SIGNIFICANT CHANGE UP
CREAT SERPL-MCNC: 0.77 MG/DL — SIGNIFICANT CHANGE UP (ref 0.5–1.3)
DIFF PNL FLD: NEGATIVE — SIGNIFICANT CHANGE UP
EOSINOPHIL # BLD AUTO: 0.12 K/UL — SIGNIFICANT CHANGE UP (ref 0–0.5)
EOSINOPHIL NFR BLD AUTO: 1.2 % — SIGNIFICANT CHANGE UP (ref 0–6)
GLUCOSE SERPL-MCNC: 110 MG/DL — HIGH (ref 70–99)
GLUCOSE UR QL: NEGATIVE MG/DL — SIGNIFICANT CHANGE UP
HCT VFR BLD CALC: 39.1 % — SIGNIFICANT CHANGE UP (ref 39–50)
HGB BLD-MCNC: 13.9 G/DL — SIGNIFICANT CHANGE UP (ref 13–17)
IMM GRANULOCYTES NFR BLD AUTO: 0.3 % — SIGNIFICANT CHANGE UP (ref 0–1.5)
KETONES UR-MCNC: NEGATIVE — SIGNIFICANT CHANGE UP
LEUKOCYTE ESTERASE UR-ACNC: NEGATIVE — SIGNIFICANT CHANGE UP
LYMPHOCYTES # BLD AUTO: 1.12 K/UL — SIGNIFICANT CHANGE UP (ref 1–3.3)
LYMPHOCYTES # BLD AUTO: 11 % — LOW (ref 13–44)
MAGNESIUM SERPL-MCNC: 2.1 MG/DL — SIGNIFICANT CHANGE UP (ref 1.8–2.6)
MCHC RBC-ENTMCNC: 35.5 GM/DL — SIGNIFICANT CHANGE UP (ref 32–36)
MCHC RBC-ENTMCNC: 35.5 PG — HIGH (ref 27–34)
MCV RBC AUTO: 99.7 FL — SIGNIFICANT CHANGE UP (ref 80–100)
MONOCYTES # BLD AUTO: 0.8 K/UL — SIGNIFICANT CHANGE UP (ref 0–0.9)
MONOCYTES NFR BLD AUTO: 7.9 % — SIGNIFICANT CHANGE UP (ref 2–14)
NEUTROPHILS # BLD AUTO: 8.02 K/UL — HIGH (ref 1.8–7.4)
NEUTROPHILS NFR BLD AUTO: 79.1 % — HIGH (ref 43–77)
NITRITE UR-MCNC: NEGATIVE — SIGNIFICANT CHANGE UP
PH UR: 7 — SIGNIFICANT CHANGE UP (ref 5–8)
PLATELET # BLD AUTO: 210 K/UL — SIGNIFICANT CHANGE UP (ref 150–400)
POTASSIUM SERPL-MCNC: 3.6 MMOL/L — SIGNIFICANT CHANGE UP (ref 3.5–5.3)
POTASSIUM SERPL-SCNC: 3.6 MMOL/L — SIGNIFICANT CHANGE UP (ref 3.5–5.3)
PROT SERPL-MCNC: 6.2 G/DL — LOW (ref 6.6–8.7)
PROT UR-MCNC: NEGATIVE MG/DL — SIGNIFICANT CHANGE UP
RBC # BLD: 3.92 M/UL — LOW (ref 4.2–5.8)
RBC # FLD: 11.8 % — SIGNIFICANT CHANGE UP (ref 10.3–14.5)
SODIUM SERPL-SCNC: 139 MMOL/L — SIGNIFICANT CHANGE UP (ref 135–145)
SP GR SPEC: 1.01 — SIGNIFICANT CHANGE UP (ref 1.01–1.02)
UROBILINOGEN FLD QL: NEGATIVE MG/DL — SIGNIFICANT CHANGE UP
WBC # BLD: 10.14 K/UL — SIGNIFICANT CHANGE UP (ref 3.8–10.5)
WBC # FLD AUTO: 10.14 K/UL — SIGNIFICANT CHANGE UP (ref 3.8–10.5)

## 2020-11-10 PROCEDURE — 36415 COLL VENOUS BLD VENIPUNCTURE: CPT

## 2020-11-10 PROCEDURE — 83735 ASSAY OF MAGNESIUM: CPT

## 2020-11-10 PROCEDURE — 80053 COMPREHEN METABOLIC PANEL: CPT

## 2020-11-10 PROCEDURE — 85025 COMPLETE CBC W/AUTO DIFF WBC: CPT

## 2020-11-10 PROCEDURE — 99283 EMERGENCY DEPT VISIT LOW MDM: CPT

## 2020-11-10 PROCEDURE — 99284 EMERGENCY DEPT VISIT MOD MDM: CPT

## 2020-11-10 PROCEDURE — 81003 URINALYSIS AUTO W/O SCOPE: CPT

## 2020-11-10 RX ORDER — SODIUM CHLORIDE 9 MG/ML
1000 INJECTION INTRAMUSCULAR; INTRAVENOUS; SUBCUTANEOUS ONCE
Refills: 0 | Status: COMPLETED | OUTPATIENT
Start: 2020-11-10 | End: 2020-11-10

## 2020-11-10 RX ADMIN — SODIUM CHLORIDE 1000 MILLILITER(S): 9 INJECTION INTRAMUSCULAR; INTRAVENOUS; SUBCUTANEOUS at 19:43

## 2020-11-10 NOTE — ED PROVIDER NOTE - CLINICAL SUMMARY MEDICAL DECISION MAKING FREE TEXT BOX
labs reviewed. Pt feeling better. no episodes in the ED. Pt with no signs of clinical withdrawal.  no tremor. no tachycardia. no htn.  Pt neurologically intact. Pt reassured and instructed to f/up as an outpatient.  Pt instructed to return for any new/concerning symptoms.  Pt given a copy of all results and instructed to f/up with pcp regarding any abnormal results.

## 2020-11-10 NOTE — ED PROVIDER NOTE - PATIENT PORTAL LINK FT
You can access the FollowMyHealth Patient Portal offered by [facility] by registering at the following website: [email or web address]. By joining [employer]’s FollowMyHealth portal, you will also be able to view your health information using other applications (apps) compatible with our system.

## 2020-11-10 NOTE — ED PROVIDER NOTE - OBJECTIVE STATEMENT
Pt is a 36 yo M co spasms.  Pt states that he was here yesterday for upper abd pain and vomiting. Pt states that his stomach is feeling better and is no longer vomiting but that he was having episodes of spasms today in his legs and arms. Pt states that he was awake during these episodes and that they would start in his legs and go to his arms.  Pt states that he has had similar episodes in the past. no fever/chills. no numbness/weakness. no other complaints.

## 2020-11-10 NOTE — ED ADULT TRIAGE NOTE - CHIEF COMPLAINT QUOTE
pt BIBA for abdominal pain, n/v x 4 days. pt states has been here several times for gastritis sx with no relief.

## 2020-11-10 NOTE — ED ADULT NURSE NOTE - OBJECTIVE STATEMENT
Pt in no apparent distress at this time. Airway patent, breathing spontaneous and nonlabored. Pt A&Ox3 resting in stretcher. Pt states his friends called ambulance due to leg shaking which is not happened since he came. pt lethargic but arousable to voice. weak but no neuro deficits noted. pt was here yesterday for abd pain. denies any drug use, last alcohol sunday.

## 2020-11-10 NOTE — ED PROVIDER NOTE - PHYSICAL EXAMINATION
Constitutional - well-developed; well nourished. Head - NCAT. Airway patent. Eyes - PERRL. CV - RRR. no murmur. no edema. Pulm - CTAB. Abd - soft, nt. no rebound. no guarding. Neuro - A&Ox3. strength 5/5 x4. sensation intact x4. normal gait. Skin - No rash. MSK - normal ROM. Constitutional - well-developed; well nourished. Head - NCAT. Airway patent. Eyes - PERRL. CV - RRR. no murmur. no edema. Pulm - CTAB. Abd - soft, nt. no rebound. no guarding. Neuro - A&Ox3. strength 5/5 x4. sensation intact x4. normal gait. CN II-XII intact. Skin - No rash. MSK - normal ROM.

## 2020-11-18 NOTE — ED ADULT NURSE NOTE - NSFALLRSKHARMRISK_ED_ALL_ED
no Referred To Plastics For Closure Text (Leave Blank If You Do Not Want): After obtaining clear surgical margins the patient was sent to on-site plastics for surgical repair.  The patient understands they will receive post-surgical care instructions and follow-up from the plastic surgeon.

## 2020-12-21 ENCOUNTER — EMERGENCY (EMERGENCY)
Facility: HOSPITAL | Age: 35
LOS: 1 days | Discharge: HOME | End: 2020-12-21
Attending: STUDENT IN AN ORGANIZED HEALTH CARE EDUCATION/TRAINING PROGRAM
Payer: MEDICAID

## 2020-12-21 VITALS
HEART RATE: 89 BPM | OXYGEN SATURATION: 97 % | TEMPERATURE: 98 F | HEIGHT: 71 IN | DIASTOLIC BLOOD PRESSURE: 92 MMHG | SYSTOLIC BLOOD PRESSURE: 144 MMHG | WEIGHT: 179.9 LBS | RESPIRATION RATE: 18 BRPM

## 2020-12-21 VITALS — DIASTOLIC BLOOD PRESSURE: 92 MMHG | SYSTOLIC BLOOD PRESSURE: 139 MMHG

## 2020-12-21 LAB
ALBUMIN SERPL ELPH-MCNC: 4.7 G/DL — SIGNIFICANT CHANGE UP (ref 3.3–5.2)
ALP SERPL-CCNC: 82 U/L — SIGNIFICANT CHANGE UP (ref 40–120)
ALT FLD-CCNC: 15 U/L — SIGNIFICANT CHANGE UP
ANION GAP SERPL CALC-SCNC: 14 MMOL/L — SIGNIFICANT CHANGE UP (ref 5–17)
AST SERPL-CCNC: 16 U/L — SIGNIFICANT CHANGE UP
BASOPHILS # BLD AUTO: 0.04 K/UL — SIGNIFICANT CHANGE UP (ref 0–0.2)
BASOPHILS NFR BLD AUTO: 0.2 % — SIGNIFICANT CHANGE UP (ref 0–2)
BILIRUB SERPL-MCNC: 0.5 MG/DL — SIGNIFICANT CHANGE UP (ref 0.4–2)
BUN SERPL-MCNC: 22 MG/DL — HIGH (ref 8–20)
CALCIUM SERPL-MCNC: 9.7 MG/DL — SIGNIFICANT CHANGE UP (ref 8.6–10.2)
CHLORIDE SERPL-SCNC: 101 MMOL/L — SIGNIFICANT CHANGE UP (ref 98–107)
CO2 SERPL-SCNC: 23 MMOL/L — SIGNIFICANT CHANGE UP (ref 22–29)
CREAT SERPL-MCNC: 0.94 MG/DL — SIGNIFICANT CHANGE UP (ref 0.5–1.3)
EOSINOPHIL # BLD AUTO: 0.08 K/UL — SIGNIFICANT CHANGE UP (ref 0–0.5)
EOSINOPHIL NFR BLD AUTO: 0.4 % — SIGNIFICANT CHANGE UP (ref 0–6)
GLUCOSE SERPL-MCNC: 169 MG/DL — HIGH (ref 70–99)
HCT VFR BLD CALC: 45.5 % — SIGNIFICANT CHANGE UP (ref 39–50)
HGB BLD-MCNC: 16.3 G/DL — SIGNIFICANT CHANGE UP (ref 13–17)
IMM GRANULOCYTES NFR BLD AUTO: 0.4 % — SIGNIFICANT CHANGE UP (ref 0–1.5)
LIDOCAIN IGE QN: 17 U/L — LOW (ref 22–51)
LYMPHOCYTES # BLD AUTO: 0.54 K/UL — LOW (ref 1–3.3)
LYMPHOCYTES # BLD AUTO: 2.9 % — LOW (ref 13–44)
MCHC RBC-ENTMCNC: 34.9 PG — HIGH (ref 27–34)
MCHC RBC-ENTMCNC: 35.8 GM/DL — SIGNIFICANT CHANGE UP (ref 32–36)
MCV RBC AUTO: 97.4 FL — SIGNIFICANT CHANGE UP (ref 80–100)
MONOCYTES # BLD AUTO: 0.68 K/UL — SIGNIFICANT CHANGE UP (ref 0–0.9)
MONOCYTES NFR BLD AUTO: 3.7 % — SIGNIFICANT CHANGE UP (ref 2–14)
NEUTROPHILS # BLD AUTO: 17.2 K/UL — HIGH (ref 1.8–7.4)
NEUTROPHILS NFR BLD AUTO: 92.4 % — HIGH (ref 43–77)
PLATELET # BLD AUTO: 244 K/UL — SIGNIFICANT CHANGE UP (ref 150–400)
POTASSIUM SERPL-MCNC: 4.2 MMOL/L — SIGNIFICANT CHANGE UP (ref 3.5–5.3)
POTASSIUM SERPL-SCNC: 4.2 MMOL/L — SIGNIFICANT CHANGE UP (ref 3.5–5.3)
PROT SERPL-MCNC: 7.4 G/DL — SIGNIFICANT CHANGE UP (ref 6.6–8.7)
RBC # BLD: 4.67 M/UL — SIGNIFICANT CHANGE UP (ref 4.2–5.8)
RBC # FLD: 11.3 % — SIGNIFICANT CHANGE UP (ref 10.3–14.5)
SODIUM SERPL-SCNC: 137 MMOL/L — SIGNIFICANT CHANGE UP (ref 135–145)
WBC # BLD: 18.62 K/UL — HIGH (ref 3.8–10.5)
WBC # FLD AUTO: 18.62 K/UL — HIGH (ref 3.8–10.5)

## 2020-12-21 PROCEDURE — 36415 COLL VENOUS BLD VENIPUNCTURE: CPT

## 2020-12-21 PROCEDURE — 80307 DRUG TEST PRSMV CHEM ANLYZR: CPT

## 2020-12-21 PROCEDURE — 80053 COMPREHEN METABOLIC PANEL: CPT

## 2020-12-21 PROCEDURE — 85025 COMPLETE CBC W/AUTO DIFF WBC: CPT

## 2020-12-21 PROCEDURE — 83735 ASSAY OF MAGNESIUM: CPT

## 2020-12-21 PROCEDURE — 96375 TX/PRO/DX INJ NEW DRUG ADDON: CPT

## 2020-12-21 PROCEDURE — 96374 THER/PROPH/DIAG INJ IV PUSH: CPT

## 2020-12-21 PROCEDURE — 99284 EMERGENCY DEPT VISIT MOD MDM: CPT

## 2020-12-21 PROCEDURE — 96376 TX/PRO/DX INJ SAME DRUG ADON: CPT

## 2020-12-21 PROCEDURE — 99284 EMERGENCY DEPT VISIT MOD MDM: CPT | Mod: 25

## 2020-12-21 PROCEDURE — 83690 ASSAY OF LIPASE: CPT

## 2020-12-21 RX ORDER — HYDROMORPHONE HYDROCHLORIDE 2 MG/ML
0.5 INJECTION INTRAMUSCULAR; INTRAVENOUS; SUBCUTANEOUS ONCE
Refills: 0 | Status: DISCONTINUED | OUTPATIENT
Start: 2020-12-21 | End: 2020-12-21

## 2020-12-21 RX ORDER — ONDANSETRON 8 MG/1
4 TABLET, FILM COATED ORAL ONCE
Refills: 0 | Status: COMPLETED | OUTPATIENT
Start: 2020-12-21 | End: 2020-12-21

## 2020-12-21 RX ORDER — HALOPERIDOL DECANOATE 100 MG/ML
2.5 INJECTION INTRAMUSCULAR ONCE
Refills: 0 | Status: COMPLETED | OUTPATIENT
Start: 2020-12-21 | End: 2020-12-21

## 2020-12-21 RX ORDER — KETOROLAC TROMETHAMINE 30 MG/ML
15 SYRINGE (ML) INJECTION ONCE
Refills: 0 | Status: DISCONTINUED | OUTPATIENT
Start: 2020-12-21 | End: 2020-12-21

## 2020-12-21 RX ORDER — SODIUM CHLORIDE 9 MG/ML
1000 INJECTION INTRAMUSCULAR; INTRAVENOUS; SUBCUTANEOUS ONCE
Refills: 0 | Status: DISCONTINUED | OUTPATIENT
Start: 2020-12-21 | End: 2020-12-21

## 2020-12-21 RX ORDER — ONDANSETRON 8 MG/1
1 TABLET, FILM COATED ORAL
Qty: 9 | Refills: 0
Start: 2020-12-21 | End: 2020-12-23

## 2020-12-21 RX ORDER — SODIUM CHLORIDE 9 MG/ML
2000 INJECTION INTRAMUSCULAR; INTRAVENOUS; SUBCUTANEOUS ONCE
Refills: 0 | Status: COMPLETED | OUTPATIENT
Start: 2020-12-21 | End: 2020-12-21

## 2020-12-21 RX ADMIN — Medication 15 MILLIGRAM(S): at 03:02

## 2020-12-21 RX ADMIN — ONDANSETRON 4 MILLIGRAM(S): 8 TABLET, FILM COATED ORAL at 03:02

## 2020-12-21 RX ADMIN — Medication 15 MILLIGRAM(S): at 05:05

## 2020-12-21 RX ADMIN — ONDANSETRON 4 MILLIGRAM(S): 8 TABLET, FILM COATED ORAL at 04:25

## 2020-12-21 RX ADMIN — HALOPERIDOL DECANOATE 2.5 MILLIGRAM(S): 100 INJECTION INTRAMUSCULAR at 03:02

## 2020-12-21 RX ADMIN — HYDROMORPHONE HYDROCHLORIDE 0.5 MILLIGRAM(S): 2 INJECTION INTRAMUSCULAR; INTRAVENOUS; SUBCUTANEOUS at 05:08

## 2020-12-21 RX ADMIN — HYDROMORPHONE HYDROCHLORIDE 0.5 MILLIGRAM(S): 2 INJECTION INTRAMUSCULAR; INTRAVENOUS; SUBCUTANEOUS at 03:02

## 2020-12-21 RX ADMIN — SODIUM CHLORIDE 2000 MILLILITER(S): 9 INJECTION INTRAMUSCULAR; INTRAVENOUS; SUBCUTANEOUS at 03:07

## 2020-12-21 RX ADMIN — Medication 15 MILLIGRAM(S): at 04:25

## 2020-12-21 RX ADMIN — HYDROMORPHONE HYDROCHLORIDE 0.5 MILLIGRAM(S): 2 INJECTION INTRAMUSCULAR; INTRAVENOUS; SUBCUTANEOUS at 05:05

## 2020-12-21 NOTE — ED PROVIDER NOTE - NSFOLLOWUPINSTRUCTIONS_ED_ALL_ED_FT
- Prescription sent to pharmacy.  - Increase fluids.  - DISCONTINUE USE OF MARIJUANA. If you do not, you will continue to have repeated vomiting.  - [place] diet as tolerated. Avoid citrus based food/drink, spicy/greasy foods, milk, caffeine.   - Please call to schedule follow up appointment with your primary care physician within 24-48 hours.  - Please seek immediate medical attention for any new/worsening, signs/symptoms, or concerns.      Acute Abdominal Pain    WHAT YOU NEED TO KNOW:    The cause of your abdominal pain may not be found. If a cause is found, treatment will depend on what the cause is.     DISCHARGE INSTRUCTIONS:    Seek care immediately if:   •You vomit blood or cannot stop vomiting.      •You have blood in your bowel movement or it looks like tar.       •You have bleeding from your rectum.       •Your abdomen is larger than usual, more painful, and hard.       •You have severe pain in your abdomen.       •You stop passing gas and having bowel movements.       •You feel weak, dizzy, or faint.      Contact your healthcare provider if:   •You have a fever.      •You have new signs and symptoms.      •Your symptoms do not get better with treatment.       •You have questions or concerns about your condition or care.      Medicines may be given to decrease pain, treat an infection, and manage your symptoms. Take your medicine as directed. Call your healthcare provider if you think your medicine is not helping or if you have side effects. Tell him if you are allergic to any medicine. Keep a list of the medicines, vitamins, and herbs you take. Include the amounts, and when and why you take them. Bring the list or the pill bottles to follow-up visits. Carry your medicine list with you in case of an emergency.    Manage your symptoms:   •Apply heat on your abdomen for 20 to 30 minutes every 2 hours for as many days as directed. Heat helps decrease pain and muscle spasms.       •Manage your stress. Stress may cause abdominal pain. Your healthcare provider may recommend relaxation techniques and deep breathing exercises to help decrease your stress. Your healthcare provider may recommend you talk to someone about your stress or anxiety, such as a counselor or a trusted friend. Get plenty of sleep and exercise regularly.       •Limit or do not drink alcohol. Alcohol can make your abdominal pain worse. Ask your healthcare provider if it is safe for you to drink alcohol. Also ask how much is safe for you to drink.       •Do not smoke. Nicotine and other chemicals in cigarettes can damage your esophagus and stomach. Ask your healthcare provider for information if you currently smoke and need help to quit. E-cigarettes or smokeless tobacco still contain nicotine. Talk to your healthcare provider before you use these products.       Make changes to the food you eat as directed: Do not eat foods that cause abdominal pain or other symptoms. Eat small meals more often.   •Eat more high-fiber foods if you are constipated. High-fiber foods include fruits, vegetables, whole-grain foods, and legumes.       •Do not eat foods that cause gas if you have bloating. Examples include broccoli, cabbage, and cauliflower. Do not drink soda or carbonated drinks, because these may also cause gas.       •Do not eat foods or drinks that contain sorbitol or fructose if you have diarrhea and bloating. Some examples are fruit juices, candy, jelly, and sugar-free gum.       •Do not eat high-fat foods, such as fried foods, cheeseburgers, hot dogs, and desserts.      •Limit or do not drink caffeine. Caffeine may make symptoms, such as heart burn or nausea, worse.       •Drink plenty of liquids to prevent dehydration from diarrhea or vomiting. Ask your healthcare provider how much liquid to drink each day and which liquids are best for you.       Follow up with your healthcare provider as directed: Write down your questions so you remember to ask them during your visits.      Cannabinoid Hyperemesis Syndrome      Cannabinoid hyperemesis syndrome (CHS) is a condition that causes repeated nausea, vomiting, and abdominal pain after long-term (chronic) use of marijuana (cannabis). People with CHS typically use marijuana 3–5 times a day for many years before they have symptoms, although it is possible to develop CHS with as little as 1 use per day.    Symptoms of CHS may be mild at first but can get worse and more frequent. In some cases, CHS may cause vomiting many times a day, which can lead to weight loss and dehydration. CHS may go away and come back many times (recur). People may not have symptoms or may otherwise be healthy in between CHS attacks.      What are the causes?    The exact cause of this condition is not known. Long-term use of marijuana may over-stimulate certain proteins in the brain that react with chemicals in marijuana (cannabinoid receptors). This over-stimulation may cause CHS.      What are the signs or symptoms?  Symptoms of this condition are often mild during the first few attacks, but they can get worse over time. Symptoms may include:  •Frequent nausea, especially early in the morning.      •Vomiting.      •Abdominal pain.      Taking several hot showers throughout the day can also be a sign of this condition. People with CHS may do this because it relieves symptoms.      How is this diagnosed?  This condition may be diagnosed based on:  •Your symptoms and medical history, including any drug use.      •A physical exam.    You may have tests done to rule out other problems. These tests may include:  •Blood tests.      •Urine tests.      •Imaging tests, such as an X-ray or CT scan.        How is this treated?  Treatment for this condition involves stopping marijuana use. Your health care provider may recommend:  •A drug rehabilitation program, if you have trouble stopping marijuana use.      •Medicines for nausea.      •Hot showers to help relieve symptoms.      Certain creams that contain a substance called capsaicin may improve symptoms when applied to the abdomen. Ask your health care provider before starting any medicines or other treatments.    Severe nausea and vomiting may require you to stay at the hospital. You may need IV fluids to prevent or treat dehydration. You may also need certain medicines that must be given at the hospital.      Follow these instructions at home:      During an attack      •Stay in bed and rest in a dark, quiet room.      •Take anti-nausea medicine as told by your health care provider.      •Try taking hot showers to relieve your symptoms.      After an attack     •Drink small amounts of clear fluids slowly. Gradually add more.      •Once you are able to eat without vomiting, eat soft foods in small amounts every 3–4 hours.        General instructions      • Do not use any products that contain marijuana.If you need help quitting, ask your health care provider for resources and treatment options.      •Drink enough fluid to keep your urine pale yellow. Avoid drinking fluids that have a lot of sugar or caffeine, such as coffee and soda.      •Take and apply over-the-counter and prescription medicines only as told by your health care provider. Ask your health care provider before starting any new medicines or treatments.      •Keep all follow-up visits as told by your health care provider. This is important.        Contact a health care provider if:    •Your symptoms get worse.      •You cannot drink fluids without vomiting.      •You have pain and trouble swallowing after an attack.        Get help right away if:    •You cannot stop vomiting.      •You have blood in your vomit or your vomit looks like coffee grounds.      •You have severe abdominal pain.      •You have stools that are bloody or black, or stools that look like tar.    •You have symptoms of dehydration, such as:  •Sunken eyes.      •Inability to make tears.      •Cracked lips.      •Dry mouth.      •Decreased urine production.      •Weakness.      •Sleepiness.      •Fainting.          Summary    •Cannabinoid hyperemesis syndrome (CHS) is a condition that causes repeated nausea, vomiting, and abdominal pain after long-term use of marijuana.      •People with CHS typically use marijuana 3–5 times a day for many years before they have symptoms, although it is possible to develop CHS with as little as 1 use per day.      •Treatment for this condition involves stopping marijuana use. Hot showers and capsaicin creams may also help relieve symptoms. Ask your health care provider before starting any medicines or other treatments.      •Your health care provider may prescribe medicines to help with nausea.      •Get help right away if you have signs of dehydration, such as dry mouth, decreased urine production, or weakness.      This information is not intended to replace advice given to you by your health care provider. Make sure you discuss any questions you have with your health care provider.

## 2020-12-21 NOTE — ED ADULT NURSE NOTE - OBJECTIVE STATEMENT
Patient A&Ox4 complaining of LLQ pain, N/V/D x 1 day. denies fever, chills, sick contacts, SOB, CP. pt admits to daily marijuana use. HX of ETOH abuse but has not drank in 4 weeks.   NAD noted, respirations even and unlabored.  Safety precautions in place.  Plan of care explained, pt verbalized understanding.

## 2020-12-21 NOTE — ED PROVIDER NOTE - OBJECTIVE STATEMENT
36 yo male PMHx ETOH abuse, gastritis, colitis, pancreatitis, gastric ulcer presents to ED c/o abdominal pain x3 hours. Pain to LLQ. Associated with nausea/vomiting/diarrhea. Symptoms of vomiting awoke patient from sleep, then abdominal pain began. Did not self medicate PTA Patient had Italian for dinner; no alcohol use in 4 weeks. +Daily marijuana use, sxms improved with hot showers. "They usually give me Dilaudid and Zofran." Recent unremarkable CT 11/2020. No further complaints at this time.   Denies abdominal surgeries, fever. 34 yo male PMHx ETOH abuse, gastritis, colitis, pancreatitis, gastric ulcer presents to ED c/o abdominal pain x3 hours. Pain to LLQ which is typical for pain. Associated with nausea/vomiting/diarrhea. Symptoms of vomiting awoke patient from sleep, then abdominal pain began. Did not self medicate PTA Patient had Italian for dinner; no alcohol use in 4 weeks. +Daily marijuana use, sxms improved with hot showers. "They usually give me Dilaudid and Zofran." Recent unremarkable CT 11/2020. No further complaints at this time.   Denies abdominal surgeries, fever. 36 yo male PMHx ETOH abuse, gastritis, colitis, alcoholic pancreatitis, gastric ulcer presents to ED c/o abdominal pain x3 hours. Pain to LLQ which is typical for pain. Associated with nausea/NBNB emesis/diarrhea. Symptoms of vomiting awoke patient from sleep, then abdominal pain began. Did not self medicate PTA Patient had Italian for dinner; no alcohol use in 4 weeks. +Daily marijuana use, sxms improved with hot showers. "They usually give me Dilaudid and Zofran." Recent unremarkable CT 11/2020. No further complaints at this time.   Denies abdominal surgeries, fever.

## 2020-12-21 NOTE — ED PROVIDER NOTE - ATTENDING CONTRIBUTION TO CARE
36 yo male w/ hx of cyclic vomiting syndrome in significant gi distress presents for management. I personally saw the patient with the PA, and completed the key components of the history and physical exam. I then discussed the management plan with the PA.

## 2020-12-21 NOTE — ED PROVIDER NOTE - CARE PROVIDERS DIRECT ADDRESSES
,[email or web address]

## 2020-12-21 NOTE — ED PROVIDER NOTE - CLINICAL SUMMARY MEDICAL DECISION MAKING FREE TEXT BOX
34 yo male PMHx ETOH abuse, gastritis, colitis, alcoholic pancreatitis, gastric ulcer presents to ED c/o abdominal pain/vomiting/diarrhea x3 hours. Patient well known to ED for similar sxms. Denies recent ETOH use, +daily marijuana use. Patient with multiple previous visits to ED for these sxms, recent CT 11/2020 unremarkable. Actively vomiting. Plan: labs, meds, reassess.

## 2020-12-21 NOTE — ED PROVIDER NOTE - PATIENT PORTAL LINK FT
You can access the FollowMyHealth Patient Portal offered by Health system by registering at the following website: [email or web address]. By joining [employer]’s FollowMyHealth portal, you will also be able to view your health information using other applications (apps) compatible with our system.

## 2020-12-21 NOTE — ED PROVIDER NOTE - CARE PROVIDER_API CALL
unk
Eitan Barnes)  Gastroenterology; Internal Medicine  [address], [facility]  [place] [address]  Phone: [phone]  Fax: [phone]  Follow Up Time:

## 2020-12-21 NOTE — ED PROVIDER NOTE - PHYSICAL EXAMINATION
General: In NAD, non-toxic, actively vomiting, uncomfortable.  Skin: No rashes or lesions. Warm, dry, color normal for race.   Head: Normocephalic/atraumatic.  Eyes: Sclera anicteric, conjunctivae clear b/l. No discharge. PERRLA, EOMI.  Neck: Supple, FROM.   Cardio: No lifts, heaves, visible pulsations. No thrills. Rate and rhythm regular, S1 & S2 clear. No audible murmur, gallop, or rub.  Resp: Normal AP to lateral diameter, symmetrical excursion b/l. Breath sounds vesicular, symmetrical and without rales, rhonchi or wheezing b/l.  Abd: Limited 2/2 lack of cooperation. Non-distended. Soft, diffuse abdominal pain, no masses palpated. No rebound, guarding. No McBurney's point tenderness. No CVA tenderness.  Neuro: A&Ox3. CN II-XII grossly intact.  Psych: Normal mood and affect. No apparent risk to self or others.

## 2020-12-21 NOTE — ED PROVIDER NOTE - PROGRESS NOTE DETAILS
REZA Townsend: Patient lowered self to floor, DID NOT FALL, 2/2 abdominal pain, actively vomiting. REZA Townsend: Patient appears more comfortable. No active vomiting, pain improved, but still present. Abdomen soft, minimal diffuse tenderness. REZA Townsend: No further episodes. Strict instruction to d/c marijuana use.

## 2021-02-04 ENCOUNTER — EMERGENCY (EMERGENCY)
Facility: HOSPITAL | Age: 36
LOS: 1 days | Discharge: HOME | End: 2021-02-04
Attending: EMERGENCY MEDICINE
Payer: MEDICAID

## 2021-02-04 VITALS
TEMPERATURE: 98 F | DIASTOLIC BLOOD PRESSURE: 78 MMHG | RESPIRATION RATE: 20 BRPM | OXYGEN SATURATION: 98 % | HEART RATE: 65 BPM | SYSTOLIC BLOOD PRESSURE: 159 MMHG

## 2021-02-04 VITALS
SYSTOLIC BLOOD PRESSURE: 154 MMHG | WEIGHT: 154.98 LBS | OXYGEN SATURATION: 100 % | TEMPERATURE: 97 F | DIASTOLIC BLOOD PRESSURE: 103 MMHG | HEART RATE: 58 BPM | RESPIRATION RATE: 20 BRPM | HEIGHT: 71 IN

## 2021-02-04 LAB
ALBUMIN SERPL ELPH-MCNC: 4.2 G/DL — SIGNIFICANT CHANGE UP (ref 3.3–5.2)
ALP SERPL-CCNC: 58 U/L — SIGNIFICANT CHANGE UP (ref 40–120)
ALT FLD-CCNC: 15 U/L — SIGNIFICANT CHANGE UP
ANION GAP SERPL CALC-SCNC: 13 MMOL/L — SIGNIFICANT CHANGE UP (ref 5–17)
AST SERPL-CCNC: 21 U/L — SIGNIFICANT CHANGE UP
BASOPHILS # BLD AUTO: 0.07 K/UL — SIGNIFICANT CHANGE UP (ref 0–0.2)
BASOPHILS NFR BLD AUTO: 0.4 % — SIGNIFICANT CHANGE UP (ref 0–2)
BILIRUB SERPL-MCNC: <0.2 MG/DL — LOW (ref 0.4–2)
BUN SERPL-MCNC: 20 MG/DL — SIGNIFICANT CHANGE UP (ref 8–20)
CALCIUM SERPL-MCNC: 9.1 MG/DL — SIGNIFICANT CHANGE UP (ref 8.6–10.2)
CHLORIDE SERPL-SCNC: 103 MMOL/L — SIGNIFICANT CHANGE UP (ref 98–107)
CO2 SERPL-SCNC: 26 MMOL/L — SIGNIFICANT CHANGE UP (ref 22–29)
CREAT SERPL-MCNC: 0.65 MG/DL — SIGNIFICANT CHANGE UP (ref 0.5–1.3)
EOSINOPHIL # BLD AUTO: 0.44 K/UL — SIGNIFICANT CHANGE UP (ref 0–0.5)
EOSINOPHIL NFR BLD AUTO: 2.6 % — SIGNIFICANT CHANGE UP (ref 0–6)
GLUCOSE SERPL-MCNC: 115 MG/DL — HIGH (ref 70–99)
HCT VFR BLD CALC: 40.6 % — SIGNIFICANT CHANGE UP (ref 39–50)
HGB BLD-MCNC: 14.6 G/DL — SIGNIFICANT CHANGE UP (ref 13–17)
IMM GRANULOCYTES NFR BLD AUTO: 0.5 % — SIGNIFICANT CHANGE UP (ref 0–1.5)
LIDOCAIN IGE QN: 32 U/L — SIGNIFICANT CHANGE UP (ref 22–51)
LYMPHOCYTES # BLD AUTO: 1.36 K/UL — SIGNIFICANT CHANGE UP (ref 1–3.3)
LYMPHOCYTES # BLD AUTO: 8 % — LOW (ref 13–44)
MCHC RBC-ENTMCNC: 34.8 PG — HIGH (ref 27–34)
MCHC RBC-ENTMCNC: 36 GM/DL — SIGNIFICANT CHANGE UP (ref 32–36)
MCV RBC AUTO: 96.9 FL — SIGNIFICANT CHANGE UP (ref 80–100)
MONOCYTES # BLD AUTO: 1.12 K/UL — HIGH (ref 0–0.9)
MONOCYTES NFR BLD AUTO: 6.6 % — SIGNIFICANT CHANGE UP (ref 2–14)
NEUTROPHILS # BLD AUTO: 13.89 K/UL — HIGH (ref 1.8–7.4)
NEUTROPHILS NFR BLD AUTO: 81.9 % — HIGH (ref 43–77)
PLATELET # BLD AUTO: 226 K/UL — SIGNIFICANT CHANGE UP (ref 150–400)
POTASSIUM SERPL-MCNC: 3.9 MMOL/L — SIGNIFICANT CHANGE UP (ref 3.5–5.3)
POTASSIUM SERPL-SCNC: 3.9 MMOL/L — SIGNIFICANT CHANGE UP (ref 3.5–5.3)
PROT SERPL-MCNC: 6.7 G/DL — SIGNIFICANT CHANGE UP (ref 6.6–8.7)
RBC # BLD: 4.19 M/UL — LOW (ref 4.2–5.8)
RBC # FLD: 12.3 % — SIGNIFICANT CHANGE UP (ref 10.3–14.5)
SODIUM SERPL-SCNC: 142 MMOL/L — SIGNIFICANT CHANGE UP (ref 135–145)
WBC # BLD: 16.97 K/UL — HIGH (ref 3.8–10.5)
WBC # FLD AUTO: 16.97 K/UL — HIGH (ref 3.8–10.5)

## 2021-02-04 PROCEDURE — 99285 EMERGENCY DEPT VISIT HI MDM: CPT

## 2021-02-04 PROCEDURE — 82962 GLUCOSE BLOOD TEST: CPT

## 2021-02-04 PROCEDURE — 80053 COMPREHEN METABOLIC PANEL: CPT

## 2021-02-04 PROCEDURE — 96375 TX/PRO/DX INJ NEW DRUG ADDON: CPT

## 2021-02-04 PROCEDURE — 96376 TX/PRO/DX INJ SAME DRUG ADON: CPT

## 2021-02-04 PROCEDURE — 96365 THER/PROPH/DIAG IV INF INIT: CPT

## 2021-02-04 PROCEDURE — 93005 ELECTROCARDIOGRAM TRACING: CPT

## 2021-02-04 PROCEDURE — 96361 HYDRATE IV INFUSION ADD-ON: CPT

## 2021-02-04 PROCEDURE — 85025 COMPLETE CBC W/AUTO DIFF WBC: CPT

## 2021-02-04 PROCEDURE — 83690 ASSAY OF LIPASE: CPT

## 2021-02-04 PROCEDURE — 36415 COLL VENOUS BLD VENIPUNCTURE: CPT

## 2021-02-04 PROCEDURE — 96372 THER/PROPH/DIAG INJ SC/IM: CPT | Mod: XU

## 2021-02-04 PROCEDURE — 93010 ELECTROCARDIOGRAM REPORT: CPT

## 2021-02-04 PROCEDURE — 99285 EMERGENCY DEPT VISIT HI MDM: CPT | Mod: 25

## 2021-02-04 RX ORDER — ONDANSETRON 8 MG/1
1 TABLET, FILM COATED ORAL
Qty: 10 | Refills: 0
Start: 2021-02-04 | End: 2021-02-08

## 2021-02-04 RX ORDER — ONDANSETRON 8 MG/1
4 TABLET, FILM COATED ORAL ONCE
Refills: 0 | Status: COMPLETED | OUTPATIENT
Start: 2021-02-04 | End: 2021-02-04

## 2021-02-04 RX ORDER — KETOROLAC TROMETHAMINE 30 MG/ML
15 SYRINGE (ML) INJECTION ONCE
Refills: 0 | Status: DISCONTINUED | OUTPATIENT
Start: 2021-02-04 | End: 2021-02-04

## 2021-02-04 RX ORDER — FAMOTIDINE 10 MG/ML
20 INJECTION INTRAVENOUS ONCE
Refills: 0 | Status: COMPLETED | OUTPATIENT
Start: 2021-02-04 | End: 2021-02-04

## 2021-02-04 RX ORDER — ONDANSETRON 8 MG/1
1 TABLET, FILM COATED ORAL
Qty: 15 | Refills: 0
Start: 2021-02-04 | End: 2021-02-08

## 2021-02-04 RX ORDER — MORPHINE SULFATE 50 MG/1
4 CAPSULE, EXTENDED RELEASE ORAL ONCE
Refills: 0 | Status: DISCONTINUED | OUTPATIENT
Start: 2021-02-04 | End: 2021-02-04

## 2021-02-04 RX ORDER — SODIUM CHLORIDE 9 MG/ML
1000 INJECTION, SOLUTION INTRAVENOUS ONCE
Refills: 0 | Status: COMPLETED | OUTPATIENT
Start: 2021-02-04 | End: 2021-02-04

## 2021-02-04 RX ORDER — FAMOTIDINE 10 MG/ML
1 INJECTION INTRAVENOUS
Qty: 28 | Refills: 0
Start: 2021-02-04 | End: 2021-02-17

## 2021-02-04 RX ORDER — HALOPERIDOL DECANOATE 100 MG/ML
2 INJECTION INTRAMUSCULAR ONCE
Refills: 0 | Status: COMPLETED | OUTPATIENT
Start: 2021-02-04 | End: 2021-02-04

## 2021-02-04 RX ADMIN — ONDANSETRON 4 MILLIGRAM(S): 8 TABLET, FILM COATED ORAL at 08:18

## 2021-02-04 RX ADMIN — FAMOTIDINE 20 MILLIGRAM(S): 10 INJECTION INTRAVENOUS at 08:48

## 2021-02-04 RX ADMIN — HALOPERIDOL DECANOATE 2 MILLIGRAM(S): 100 INJECTION INTRAMUSCULAR at 10:54

## 2021-02-04 RX ADMIN — SODIUM CHLORIDE 1000 MILLILITER(S): 9 INJECTION, SOLUTION INTRAVENOUS at 08:19

## 2021-02-04 RX ADMIN — ONDANSETRON 4 MILLIGRAM(S): 8 TABLET, FILM COATED ORAL at 09:27

## 2021-02-04 RX ADMIN — MORPHINE SULFATE 4 MILLIGRAM(S): 50 CAPSULE, EXTENDED RELEASE ORAL at 08:29

## 2021-02-04 RX ADMIN — Medication 10 MILLIGRAM(S): at 09:54

## 2021-02-04 RX ADMIN — SODIUM CHLORIDE 1000 MILLILITER(S): 9 INJECTION, SOLUTION INTRAVENOUS at 09:19

## 2021-02-04 RX ADMIN — FAMOTIDINE 100 MILLIGRAM(S): 10 INJECTION INTRAVENOUS at 08:18

## 2021-02-04 RX ADMIN — Medication 15 MILLIGRAM(S): at 08:18

## 2021-02-04 NOTE — ED PROVIDER NOTE - PROGRESS NOTE DETAILS
Patient symptoms improved after medical management. Labwork unremarkable for acute pancreatitis. Will observe patient for worsening or symptoms. To discharge with GI follow up. Patient tolerating PO. Requesting pain medication. Patient not vomiting. Labwork unremarkable. Will discharge. Vladimir: patient received multiple doses of zofran, pepcid, and now haldo. patient awake, report feeling better. comfortable going home.

## 2021-02-04 NOTE — ED ADULT NURSE NOTE - NSIMPLEMENTINTERV_GEN_ALL_ED
Implemented All Universal Safety Interventions:  [place] to call system. Call bell, personal items and telephone within reach. Instruct patient to call for assistance. Room bathroom lighting operational. Non-slip footwear when patient is off stretcher. Physically safe environment: no spills, clutter or unnecessary equipment. Stretcher in lowest position, wheels locked, appropriate side rails in place.

## 2021-02-04 NOTE — ED PROVIDER NOTE - CARE PROVIDER_API CALL
Sandor Lopez)  Gastroenterology; Internal Medicine  [address] [facility], [facility] [address]  [place] [address]  Phone: [phone]  Fax: [phone]  Follow Up Time: Routine

## 2021-02-04 NOTE — ED PROVIDER NOTE - OBJECTIVE STATEMENT
Patient is a 36yo M presenting with abdominal pain nausea and vomiting that began this morning. He reports he was drinking at approx midnight and then began having symptoms. He reports that he has a history of pancreatitis and that he has not followed up with GI, even though he should be. He reports that the pain is in the middle of his belly and feels like his regular pancreatitis pain. He endorses radiation of the pain all over his abdomen. He states that he has been vomiting a lot and that it is just food content. He denies any bloody, dark, or green bilious vomitus. He denies any f/c/cp/sob.

## 2021-02-04 NOTE — ED ADULT NURSE NOTE - OBJECTIVE STATEMENT
Assumed care 0820 pt co N/V diarrhea, and pain to left side,  pt states last drink was12 am today. pt denies any fevers, chills, sick contacts.

## 2021-02-04 NOTE — ED PROVIDER NOTE - CLINICAL SUMMARY MEDICAL DECISION MAKING FREE TEXT BOX
Patient with history of alcoholic pancreatitis, noncompliant with abstinence or GI follow up, presenting with recurrence of abdominal pain. Will evaluate with labwork including lipase. To treat with zofran, IVF, toradol. Will reassess symptoms and labwork.

## 2021-02-04 NOTE — ED ADULT TRIAGE NOTE - CHIEF COMPLAINT QUOTE
Pt BIBA A&ox3 c/o abdominal pain, nausea and vomiting. Hx of Pancreatitis, states he drank 5 alcoholic drinks at approx 12am.

## 2021-02-04 NOTE — ED PROVIDER NOTE - PHYSICAL EXAMINATION
General: Uncomfortable male in mild distress  HEENT: Normocephalic, atraumatic. Moist mucous membranes.   Eyes: No scleral icterus. EOMI. DORINA.  Neck: Soft and supple. Full ROM without pain.   Cardiac: Regular rate and regular rhythm. No murmurs.   Resp: Lungs CTAB. No wheezes, rales or rhonchi.  Abd: Diffuse TTP. Actively vomiting.   Back: Spine midline and non-tender. No CVA tenderness.    Skin: No rashes, abrasions, or lacerations.  Neuro: AO x 3. Moves all extremities symmetrically. no nausea/no diarrhea/no vomiting

## 2021-02-04 NOTE — ED PROVIDER NOTE - ATTENDING CONTRIBUTION TO CARE
I, Karl Gilbert, personally saw the patient with the resident, and completed the key components of the history and physical exam. I then discussed the management plan with the resident.    34 yo M hx of alcoholic pancreatitis and marijuanna abuse report abdominal pain and vomiting since last night. patient report having 5 drinks yesterday. lipase wnl. blood work unremarkable. ekg without QTC prolongation. Patient given zofran x 2, morpihne 4 mg , pepcidi, benytl, toradol, haldo with improvement. will discharge home with zofran and pepcid. GI follow up.

## 2021-02-04 NOTE — ED PROVIDER NOTE - PATIENT PORTAL LINK FT
You can access the FollowMyHealth Patient Portal offered by [facility] by registering at the following website: [email or web address]. By joining [employer]’s FollowMyHealth portal, you will also be able to view your health information using other applications (apps) compatible with our system.

## 2021-02-04 NOTE — ED ADULT NURSE REASSESSMENT NOTE - NS ED NURSE REASSESS COMMENT FT1
belonging not found on pt, charge RN and nurse manager aware, multiple people looked for belonging but unable to find it, pt made aware and speaking to GEORGINA West at the moment, Security also called to bedside for pt to make a report, pt given clothing.

## 2021-02-04 NOTE — CHART NOTE - NSCHARTNOTEFT_GEN_A_CORE
GEORGINA Note: GEORGINA made aware pt's belongings have been lost, pt is in need of transport home. SW met with pt, pt confirmed address on facesheet is current residence. Pt stating his keys are with his lost belongings however his uncle will be home to let him in. GEORGINA made pt aware he has medicaid, pt in agreement to medicaid taxi for d/c. Medicaid taxi arranged and info provided to pt, no further SW services identified at this time

## 2021-02-04 NOTE — ED PROVIDER NOTE - NS ED ROS FT
General: Denies fever, chills  HEENT: Denies sensory changes  Neck: Denies neck pain, neck stiffness  Resp: Denies coughing, SOB  Cardiovascular: Denies CP, palpitations  GI: Endorses abdominal pain, nausea, vomiting  : Denies dysuria, hematuria, incontinence  MSK: Denies back pain  Neuro: Denies HA, dizziness  Skin: Denies rashes

## 2021-02-08 NOTE — ED ADULT TRIAGE NOTE - IDEAL BODY WEIGHT(KG)
Health Maintenance Due   Topic Date Due   • Shingles Vaccine (2 of 3) 10/08/2016   • Diabetes Eye Exam  02/20/2020       Patient is due for topics as listed above but is not proceeding with Immunization(s) Shingles and Diabetes Eye Exam at this time.         Care teams updated with patient    Advance Directives:  on file     75

## 2021-03-16 ENCOUNTER — EMERGENCY (EMERGENCY)
Facility: HOSPITAL | Age: 36
LOS: 1 days | Discharge: HOME | End: 2021-03-16
Attending: EMERGENCY MEDICINE
Payer: MEDICAID

## 2021-03-16 VITALS
DIASTOLIC BLOOD PRESSURE: 72 MMHG | OXYGEN SATURATION: 98 % | SYSTOLIC BLOOD PRESSURE: 112 MMHG | HEART RATE: 78 BPM | TEMPERATURE: 98 F | RESPIRATION RATE: 20 BRPM

## 2021-03-16 VITALS
TEMPERATURE: 97 F | RESPIRATION RATE: 16 BRPM | SYSTOLIC BLOOD PRESSURE: 119 MMHG | DIASTOLIC BLOOD PRESSURE: 105 MMHG | HEIGHT: 71 IN | WEIGHT: 173.94 LBS | OXYGEN SATURATION: 96 % | HEART RATE: 87 BPM

## 2021-03-16 LAB
ALBUMIN SERPL ELPH-MCNC: 4.8 G/DL — SIGNIFICANT CHANGE UP (ref 3.3–5.2)
ALP SERPL-CCNC: 63 U/L — SIGNIFICANT CHANGE UP (ref 40–120)
ALT FLD-CCNC: 25 U/L — SIGNIFICANT CHANGE UP
ANION GAP SERPL CALC-SCNC: 13 MMOL/L — SIGNIFICANT CHANGE UP (ref 5–17)
ANISOCYTOSIS BLD QL: SLIGHT — SIGNIFICANT CHANGE UP
APAP SERPL-MCNC: <3 UG/ML — LOW (ref 10–26)
AST SERPL-CCNC: 24 U/L — SIGNIFICANT CHANGE UP
BASOPHILS # BLD AUTO: 0.28 K/UL — HIGH (ref 0–0.2)
BASOPHILS NFR BLD AUTO: 1.7 % — SIGNIFICANT CHANGE UP (ref 0–2)
BILIRUB SERPL-MCNC: 0.3 MG/DL — LOW (ref 0.4–2)
BUN SERPL-MCNC: 13 MG/DL — SIGNIFICANT CHANGE UP (ref 8–20)
CALCIUM SERPL-MCNC: 9.5 MG/DL — SIGNIFICANT CHANGE UP (ref 8.6–10.2)
CHLORIDE SERPL-SCNC: 99 MMOL/L — SIGNIFICANT CHANGE UP (ref 98–107)
CK SERPL-CCNC: 79 U/L — SIGNIFICANT CHANGE UP (ref 30–200)
CO2 SERPL-SCNC: 27 MMOL/L — SIGNIFICANT CHANGE UP (ref 22–29)
CREAT SERPL-MCNC: 0.91 MG/DL — SIGNIFICANT CHANGE UP (ref 0.5–1.3)
EOSINOPHIL # BLD AUTO: 0.42 K/UL — SIGNIFICANT CHANGE UP (ref 0–0.5)
EOSINOPHIL NFR BLD AUTO: 2.6 % — SIGNIFICANT CHANGE UP (ref 0–6)
GIANT PLATELETS BLD QL SMEAR: PRESENT — SIGNIFICANT CHANGE UP
GLUCOSE SERPL-MCNC: 231 MG/DL — HIGH (ref 70–99)
HCT VFR BLD CALC: 45.1 % — SIGNIFICANT CHANGE UP (ref 39–50)
HGB BLD-MCNC: 15.4 G/DL — SIGNIFICANT CHANGE UP (ref 13–17)
LYMPHOCYTES # BLD AUTO: 17.4 % — SIGNIFICANT CHANGE UP (ref 13–44)
LYMPHOCYTES # BLD AUTO: 2.82 K/UL — SIGNIFICANT CHANGE UP (ref 1–3.3)
MACROCYTES BLD QL: SLIGHT — SIGNIFICANT CHANGE UP
MANUAL SMEAR VERIFICATION: SIGNIFICANT CHANGE UP
MCHC RBC-ENTMCNC: 34.1 GM/DL — SIGNIFICANT CHANGE UP (ref 32–36)
MCHC RBC-ENTMCNC: 34.8 PG — HIGH (ref 27–34)
MCV RBC AUTO: 101.8 FL — HIGH (ref 80–100)
MONOCYTES # BLD AUTO: 1.56 K/UL — HIGH (ref 0–0.9)
MONOCYTES NFR BLD AUTO: 9.6 % — SIGNIFICANT CHANGE UP (ref 2–14)
MYELOCYTES NFR BLD: 0.9 % — HIGH (ref 0–0)
NEUTROPHILS # BLD AUTO: 10.58 K/UL — HIGH (ref 1.8–7.4)
NEUTROPHILS NFR BLD AUTO: 65.2 % — SIGNIFICANT CHANGE UP (ref 43–77)
PLAT MORPH BLD: NORMAL — SIGNIFICANT CHANGE UP
PLATELET # BLD AUTO: 329 K/UL — SIGNIFICANT CHANGE UP (ref 150–400)
POLYCHROMASIA BLD QL SMEAR: SLIGHT — SIGNIFICANT CHANGE UP
POTASSIUM SERPL-MCNC: 3.7 MMOL/L — SIGNIFICANT CHANGE UP (ref 3.5–5.3)
POTASSIUM SERPL-SCNC: 3.7 MMOL/L — SIGNIFICANT CHANGE UP (ref 3.5–5.3)
PROT SERPL-MCNC: 7.4 G/DL — SIGNIFICANT CHANGE UP (ref 6.6–8.7)
RBC # BLD: 4.43 M/UL — SIGNIFICANT CHANGE UP (ref 4.2–5.8)
RBC # FLD: 12.2 % — SIGNIFICANT CHANGE UP (ref 10.3–14.5)
RBC BLD AUTO: ABNORMAL
SALICYLATES SERPL-MCNC: <0.6 MG/DL — LOW (ref 10–20)
SODIUM SERPL-SCNC: 138 MMOL/L — SIGNIFICANT CHANGE UP (ref 135–145)
VARIANT LYMPHS # BLD: 2.6 % — SIGNIFICANT CHANGE UP (ref 0–6)
WBC # BLD: 16.22 K/UL — HIGH (ref 3.8–10.5)
WBC # FLD AUTO: 16.22 K/UL — HIGH (ref 3.8–10.5)

## 2021-03-16 PROCEDURE — 71045 X-RAY EXAM CHEST 1 VIEW: CPT | Mod: 26

## 2021-03-16 PROCEDURE — 93010 ELECTROCARDIOGRAM REPORT: CPT

## 2021-03-16 PROCEDURE — 99285 EMERGENCY DEPT VISIT HI MDM: CPT

## 2021-03-16 RX ORDER — NALOXONE HYDROCHLORIDE 4 MG/.1ML
2 SPRAY NASAL ONCE
Refills: 0 | Status: COMPLETED | OUTPATIENT
Start: 2021-03-16 | End: 2021-03-16

## 2021-03-16 RX ADMIN — NALOXONE HYDROCHLORIDE 2 MILLIGRAM(S): 4 SPRAY NASAL at 19:28

## 2021-03-16 NOTE — ED PROVIDER NOTE - PATIENT PORTAL LINK FT
You can access the FollowMyHealth Patient Portal offered by [facility] by registering at the following website: [email or web address]. By joining [employer]’s FollowMyHealth portal, you will also be able to view your health information using other applications (apps) compatible with our system.
arm

## 2021-03-16 NOTE — ED PROVIDER NOTE - ATTENDING CONTRIBUTION TO CARE
I personally saw the patient with the resident, and completed the key components of the history and physical exam. I then discussed the management plan with the resident.   gen in nad resp no rhocnhi cardiac no murmur abd soft neuro intact

## 2021-03-16 NOTE — ED PROVIDER NOTE - OBJECTIVE STATEMENT
PT is a 35yo M presenting with an overdose. EMS states that he took xanax and was called at home for unresponsiveness. In ED patient endorses taking 1mg xanax x2 and smoking pot but denies other drug use.

## 2021-03-16 NOTE — ED PROVIDER NOTE - PHYSICAL EXAMINATION
General: Well developed male in no acute distress  HEENT: Normocephalic, atraumatic. Moist mucous membranes. Oropharynx clear.  Eyes: No scleral icterus. EOMI. DORINA.  Cardiac: Regular rate and regular rhythm. No murmurs. No LE edema.  Resp: Lungs CTAB. No wheezes, rales or rhonchi.  Abd: Soft, non-tender, non-distended. No guarding or rebound. No scars, masses, or lesions..    Skin: No rashes, abrasions, or lacerations.  MSK: No bruising or obvious injuries to extremities

## 2021-03-16 NOTE — ED PROVIDER NOTE - PROGRESS NOTE DETAILS
Patient found unresponsive and agonally breathing. Code blue called. Patient bagged with improvement in saturation. 2mg Narcan given and patient awoke immediately with commencement of shaking. pt ambulating in nad no si no hi refusing opiod resources return precautions given

## 2021-03-16 NOTE — ED PROVIDER NOTE - CLINICAL SUMMARY MEDICAL DECISION MAKING FREE TEXT BOX
Patient presenting with lethargy and reported xanax overdose. Patient presenting with lethargy and reported xanax overdose.    seee progress notes

## 2021-03-16 NOTE — ED ADULT TRIAGE NOTE - CHIEF COMPLAINT QUOTE
Patient BIBA to ED today after friend called 911 due to patient being lethargic and having a moment of unresponsiveness.  Patient presents drowsy to ED, answers questions, reports taking two 1mg Xanax pills today and smoking pot.

## 2021-03-16 NOTE — ED ADULT NURSE REASSESSMENT NOTE - NS ED NURSE REASSESS COMMENT FT1
received pt from day shift nurse. Pt is resting in bed comfortably at this time, no apparent distress noted at this time. pt safety maintained. Pt denies any complaints at this time. pt educated on plan of care, plan of care taught back to RN. plan of care education deemed proficient through successful teach back. will continue to reeducate pt regarding plan of care. pt on CM.

## 2021-03-16 NOTE — ED ADULT NURSE REASSESSMENT NOTE - NS ED NURSE REASSESS COMMENT FT1
Upon passing room pt was blue in the face and code blue initiated, code team and MD Lopez at bedside, 2mg Narcan IVP administered, pink color returned to face and body, pt showing sinus tach on monitor, no compressions began

## 2021-03-17 PROCEDURE — 80053 COMPREHEN METABOLIC PANEL: CPT

## 2021-03-17 PROCEDURE — 36415 COLL VENOUS BLD VENIPUNCTURE: CPT

## 2021-03-17 PROCEDURE — 80307 DRUG TEST PRSMV CHEM ANLYZR: CPT

## 2021-03-17 PROCEDURE — 71045 X-RAY EXAM CHEST 1 VIEW: CPT

## 2021-03-17 PROCEDURE — 82962 GLUCOSE BLOOD TEST: CPT

## 2021-03-17 PROCEDURE — 96374 THER/PROPH/DIAG INJ IV PUSH: CPT

## 2021-03-17 PROCEDURE — 96375 TX/PRO/DX INJ NEW DRUG ADDON: CPT

## 2021-03-17 PROCEDURE — 99285 EMERGENCY DEPT VISIT HI MDM: CPT | Mod: 25

## 2021-03-17 PROCEDURE — 93005 ELECTROCARDIOGRAM TRACING: CPT

## 2021-03-17 PROCEDURE — 85025 COMPLETE CBC W/AUTO DIFF WBC: CPT

## 2021-03-17 PROCEDURE — 82550 ASSAY OF CK (CPK): CPT

## 2021-03-17 RX ORDER — ONDANSETRON 8 MG/1
8 TABLET, FILM COATED ORAL ONCE
Refills: 0 | Status: COMPLETED | OUTPATIENT
Start: 2021-03-17 | End: 2021-03-17

## 2021-03-17 RX ADMIN — ONDANSETRON 8 MILLIGRAM(S): 8 TABLET, FILM COATED ORAL at 02:23

## 2021-03-17 RX ADMIN — Medication 30 MILLILITER(S): at 02:33

## 2021-03-17 NOTE — ED ADULT NURSE NOTE - CHIEF COMPLAINT
The patient is a 36y Male complaining of overdose.
range of motion is not limited and there is no muscle tenderness.

## 2021-04-10 ENCOUNTER — EMERGENCY (EMERGENCY)
Facility: HOSPITAL | Age: 36
LOS: 1 days | Discharge: HOME | End: 2021-04-10
Attending: STUDENT IN AN ORGANIZED HEALTH CARE EDUCATION/TRAINING PROGRAM
Payer: MEDICAID

## 2021-04-10 VITALS
RESPIRATION RATE: 20 BRPM | WEIGHT: 145.06 LBS | HEART RATE: 82 BPM | TEMPERATURE: 98 F | HEIGHT: 71 IN | DIASTOLIC BLOOD PRESSURE: 77 MMHG | OXYGEN SATURATION: 99 % | SYSTOLIC BLOOD PRESSURE: 146 MMHG

## 2021-04-10 VITALS
OXYGEN SATURATION: 100 % | SYSTOLIC BLOOD PRESSURE: 122 MMHG | RESPIRATION RATE: 20 BRPM | DIASTOLIC BLOOD PRESSURE: 84 MMHG | TEMPERATURE: 98 F | HEART RATE: 80 BPM

## 2021-04-10 LAB
ALBUMIN SERPL ELPH-MCNC: 4.5 G/DL — SIGNIFICANT CHANGE UP (ref 3.3–5.2)
ALP SERPL-CCNC: 59 U/L — SIGNIFICANT CHANGE UP (ref 40–120)
ALT FLD-CCNC: 18 U/L — SIGNIFICANT CHANGE UP
ANION GAP SERPL CALC-SCNC: 16 MMOL/L — SIGNIFICANT CHANGE UP (ref 5–17)
AST SERPL-CCNC: 17 U/L — SIGNIFICANT CHANGE UP
BASOPHILS # BLD AUTO: 0.05 K/UL — SIGNIFICANT CHANGE UP (ref 0–0.2)
BASOPHILS NFR BLD AUTO: 0.3 % — SIGNIFICANT CHANGE UP (ref 0–2)
BILIRUB SERPL-MCNC: 0.3 MG/DL — LOW (ref 0.4–2)
BUN SERPL-MCNC: 24 MG/DL — HIGH (ref 8–20)
CALCIUM SERPL-MCNC: 9.5 MG/DL — SIGNIFICANT CHANGE UP (ref 8.6–10.2)
CHLORIDE SERPL-SCNC: 100 MMOL/L — SIGNIFICANT CHANGE UP (ref 98–107)
CO2 SERPL-SCNC: 23 MMOL/L — SIGNIFICANT CHANGE UP (ref 22–29)
CREAT SERPL-MCNC: 0.81 MG/DL — SIGNIFICANT CHANGE UP (ref 0.5–1.3)
EOSINOPHIL # BLD AUTO: 0.33 K/UL — SIGNIFICANT CHANGE UP (ref 0–0.5)
EOSINOPHIL NFR BLD AUTO: 1.9 % — SIGNIFICANT CHANGE UP (ref 0–6)
GLUCOSE SERPL-MCNC: 128 MG/DL — HIGH (ref 70–99)
HCT VFR BLD CALC: 41 % — SIGNIFICANT CHANGE UP (ref 39–50)
HGB BLD-MCNC: 15.1 G/DL — SIGNIFICANT CHANGE UP (ref 13–17)
IMM GRANULOCYTES NFR BLD AUTO: 0.3 % — SIGNIFICANT CHANGE UP (ref 0–1.5)
LACTATE BLDV-MCNC: 2.3 MMOL/L — HIGH (ref 0.5–2)
LIDOCAIN IGE QN: 32 U/L — SIGNIFICANT CHANGE UP (ref 22–51)
LYMPHOCYTES # BLD AUTO: 13.1 % — SIGNIFICANT CHANGE UP (ref 13–44)
LYMPHOCYTES # BLD AUTO: 2.26 K/UL — SIGNIFICANT CHANGE UP (ref 1–3.3)
MCHC RBC-ENTMCNC: 35.1 PG — HIGH (ref 27–34)
MCHC RBC-ENTMCNC: 36.8 GM/DL — HIGH (ref 32–36)
MCV RBC AUTO: 95.3 FL — SIGNIFICANT CHANGE UP (ref 80–100)
MONOCYTES # BLD AUTO: 1.27 K/UL — HIGH (ref 0–0.9)
MONOCYTES NFR BLD AUTO: 7.4 % — SIGNIFICANT CHANGE UP (ref 2–14)
NEUTROPHILS # BLD AUTO: 13.25 K/UL — HIGH (ref 1.8–7.4)
NEUTROPHILS NFR BLD AUTO: 77 % — SIGNIFICANT CHANGE UP (ref 43–77)
PLATELET # BLD AUTO: 225 K/UL — SIGNIFICANT CHANGE UP (ref 150–400)
POTASSIUM SERPL-MCNC: 3.6 MMOL/L — SIGNIFICANT CHANGE UP (ref 3.5–5.3)
POTASSIUM SERPL-SCNC: 3.6 MMOL/L — SIGNIFICANT CHANGE UP (ref 3.5–5.3)
PROT SERPL-MCNC: 6.9 G/DL — SIGNIFICANT CHANGE UP (ref 6.6–8.7)
RBC # BLD: 4.3 M/UL — SIGNIFICANT CHANGE UP (ref 4.2–5.8)
RBC # FLD: 11.9 % — SIGNIFICANT CHANGE UP (ref 10.3–14.5)
SODIUM SERPL-SCNC: 139 MMOL/L — SIGNIFICANT CHANGE UP (ref 135–145)
WBC # BLD: 17.22 K/UL — HIGH (ref 3.8–10.5)
WBC # FLD AUTO: 17.22 K/UL — HIGH (ref 3.8–10.5)

## 2021-04-10 PROCEDURE — 96365 THER/PROPH/DIAG IV INF INIT: CPT

## 2021-04-10 PROCEDURE — 83690 ASSAY OF LIPASE: CPT

## 2021-04-10 PROCEDURE — 85025 COMPLETE CBC W/AUTO DIFF WBC: CPT

## 2021-04-10 PROCEDURE — 36415 COLL VENOUS BLD VENIPUNCTURE: CPT

## 2021-04-10 PROCEDURE — 99284 EMERGENCY DEPT VISIT MOD MDM: CPT

## 2021-04-10 PROCEDURE — 83605 ASSAY OF LACTIC ACID: CPT

## 2021-04-10 PROCEDURE — 83735 ASSAY OF MAGNESIUM: CPT

## 2021-04-10 PROCEDURE — 80053 COMPREHEN METABOLIC PANEL: CPT

## 2021-04-10 PROCEDURE — 96366 THER/PROPH/DIAG IV INF ADDON: CPT

## 2021-04-10 PROCEDURE — 99284 EMERGENCY DEPT VISIT MOD MDM: CPT | Mod: 25

## 2021-04-10 PROCEDURE — 96375 TX/PRO/DX INJ NEW DRUG ADDON: CPT

## 2021-04-10 PROCEDURE — 96376 TX/PRO/DX INJ SAME DRUG ADON: CPT

## 2021-04-10 RX ORDER — FAMOTIDINE 10 MG/ML
20 INJECTION INTRAVENOUS ONCE
Refills: 0 | Status: COMPLETED | OUTPATIENT
Start: 2021-04-10 | End: 2021-04-10

## 2021-04-10 RX ORDER — KETOROLAC TROMETHAMINE 30 MG/ML
15 SYRINGE (ML) INJECTION ONCE
Refills: 0 | Status: DISCONTINUED | OUTPATIENT
Start: 2021-04-10 | End: 2021-04-10

## 2021-04-10 RX ORDER — SODIUM CHLORIDE 9 MG/ML
2000 INJECTION INTRAMUSCULAR; INTRAVENOUS; SUBCUTANEOUS ONCE
Refills: 0 | Status: COMPLETED | OUTPATIENT
Start: 2021-04-10 | End: 2021-04-10

## 2021-04-10 RX ORDER — ONDANSETRON 8 MG/1
4 TABLET, FILM COATED ORAL ONCE
Refills: 0 | Status: COMPLETED | OUTPATIENT
Start: 2021-04-10 | End: 2021-04-10

## 2021-04-10 RX ORDER — HALOPERIDOL DECANOATE 100 MG/ML
2 INJECTION INTRAMUSCULAR ONCE
Refills: 0 | Status: COMPLETED | OUTPATIENT
Start: 2021-04-10 | End: 2021-04-10

## 2021-04-10 RX ORDER — DIPHENHYDRAMINE HCL 50 MG
25 CAPSULE ORAL ONCE
Refills: 0 | Status: COMPLETED | OUTPATIENT
Start: 2021-04-10 | End: 2021-04-10

## 2021-04-10 RX ORDER — SODIUM CHLORIDE 9 MG/ML
500 INJECTION, SOLUTION INTRAVENOUS
Refills: 0 | Status: COMPLETED | OUTPATIENT
Start: 2021-04-10 | End: 2021-04-10

## 2021-04-10 RX ORDER — ONDANSETRON 8 MG/1
1 TABLET, FILM COATED ORAL
Qty: 20 | Refills: 0
Start: 2021-04-10

## 2021-04-10 RX ORDER — SODIUM CHLORIDE 9 MG/ML
500 INJECTION, SOLUTION INTRAVENOUS
Refills: 0 | Status: DISCONTINUED | OUTPATIENT
Start: 2021-04-10 | End: 2021-04-10

## 2021-04-10 RX ORDER — MORPHINE SULFATE 50 MG/1
4 CAPSULE, EXTENDED RELEASE ORAL ONCE
Refills: 0 | Status: DISCONTINUED | OUTPATIENT
Start: 2021-04-10 | End: 2021-04-10

## 2021-04-10 RX ORDER — PROCHLORPERAZINE MALEATE 5 MG
10 TABLET ORAL ONCE
Refills: 0 | Status: COMPLETED | OUTPATIENT
Start: 2021-04-10 | End: 2021-04-10

## 2021-04-10 RX ADMIN — Medication 25 MILLIGRAM(S): at 07:22

## 2021-04-10 RX ADMIN — HALOPERIDOL DECANOATE 2 MILLIGRAM(S): 100 INJECTION INTRAMUSCULAR at 03:31

## 2021-04-10 RX ADMIN — MORPHINE SULFATE 4 MILLIGRAM(S): 50 CAPSULE, EXTENDED RELEASE ORAL at 03:58

## 2021-04-10 RX ADMIN — SODIUM CHLORIDE 500 MILLILITER(S): 9 INJECTION, SOLUTION INTRAVENOUS at 11:25

## 2021-04-10 RX ADMIN — Medication 104 MILLIGRAM(S): at 07:23

## 2021-04-10 RX ADMIN — ONDANSETRON 4 MILLIGRAM(S): 8 TABLET, FILM COATED ORAL at 04:54

## 2021-04-10 RX ADMIN — Medication 25 MILLIGRAM(S): at 04:54

## 2021-04-10 RX ADMIN — ONDANSETRON 4 MILLIGRAM(S): 8 TABLET, FILM COATED ORAL at 11:00

## 2021-04-10 RX ADMIN — MORPHINE SULFATE 4 MILLIGRAM(S): 50 CAPSULE, EXTENDED RELEASE ORAL at 08:53

## 2021-04-10 RX ADMIN — ONDANSETRON 4 MILLIGRAM(S): 8 TABLET, FILM COATED ORAL at 09:19

## 2021-04-10 RX ADMIN — Medication 15 MILLIGRAM(S): at 11:00

## 2021-04-10 RX ADMIN — Medication 10 MILLIGRAM(S): at 09:19

## 2021-04-10 RX ADMIN — SODIUM CHLORIDE 2000 MILLILITER(S): 9 INJECTION INTRAMUSCULAR; INTRAVENOUS; SUBCUTANEOUS at 03:31

## 2021-04-10 RX ADMIN — FAMOTIDINE 20 MILLIGRAM(S): 10 INJECTION INTRAVENOUS at 03:31

## 2021-04-10 NOTE — ED PROVIDER NOTE - CLINICAL SUMMARY MEDICAL DECISION MAKING FREE TEXT BOX
35yo M with n/v/d, diffuse abd pain x 6 hours, started after smoking marijuana. Hx similar symptoms in past, feels similar to prior episodes of cyclic vomiting. Will check labs, tx symptoms with IVF, haldol, pepcid and re-assess

## 2021-04-10 NOTE — ED ADULT NURSE REASSESSMENT NOTE - NS ED NURSE REASSESS COMMENT FT1
pt was sleeping comfortable and discharged when asked if he had pain he shook his head yes. VSS dispo home

## 2021-04-10 NOTE — ED PROVIDER NOTE - NSFOLLOWUPINSTRUCTIONS_ED_ALL_ED_FT
- FOLLOW UP WITH YOUR PRIMARY CARE DOCTOR  - TAKE ZOFRAN AS NEEDED FOR SYMPTOMS  - PLEASE STOP USING MARIJUANA, AS THIS IS LIKELY CONTRIBUTING TO YOUR SYMPTOMS (IT MAY TAKES MONTHS FOR THE VOMITING TO GO AWAY AFTER QUITTING)  - RETURN TO THE EMERGENCY ROOM FOR ANY NEW OR WORSENING SYMPTOMS    ---------------------------------------      Cyclic Vomiting Syndrome    WHAT YOU NEED TO KNOW:    Cyclic vomiting syndrome is a condition that causes you to vomit many times in a row for no known reason. It is important to prevent dehydration and other serious complications from repeated vomiting. Work with your healthcare provider to manage or prevent episodes.    DISCHARGE INSTRUCTIONS:    Return to the emergency department if:   •You see blood in your vomit or your bowel movements.      •You have sudden, severe pain in your chest and upper abdomen after hard vomiting or retching.      •You have swelling in your neck and chest.       •You are dizzy, cold, and thirsty, and your eyes and mouth are dry.      •You are urinating very little or not at all.      •You have muscle weakness, leg cramps, and trouble breathing.      •Your heart is beating much faster than normal.      •You continue to vomit for more than 48 hours.      Contact your healthcare provider if:   •You have frequent dry heaves (vomiting but nothing comes out).      •You have questions or concerns about your condition or care.      Medicines: You may need any of the following:   •Migraine medicine may be used to prevent or stop migraine headaches. This may be given if you have migraines or are at risk because of a family history of migraines. You may need to take this medicine to prevent migraines or to stop a migraine that has started.      •Antinausea medicine may be needed to control your nausea.      •Medicine may be given to control the amount of acid your stomach makes.      •Take your medicine as directed. Contact your healthcare provider if you think your medicine is not helping or if you have side effects. Tell him or her if you are allergic to any medicine. Keep a list of the medicines, vitamins, and herbs you take. Include the amounts, and when and why you take them. Bring the list or the pill bottles to follow-up visits. Carry your medicine list with you in case of an emergency.      Prevent or manage episodes:   •Avoid triggers. Certain foods can trigger episodes, such as chocolate, cheese, and monosodium glutamate (MSG). Caffeine can also trigger an episode. Your healthcare provider or dietitian can help you identify foods that trigger an episode. This will help you create meal plans to avoid those triggers. Other triggers include too much exercise, motion sickness, or being in hot weather too long.      •Drink more liquids as directed. Vomiting can lead to dehydration. It is important to drink more liquids to help replace lost body fluids. Ask your healthcare provider how much liquid to drink each day and which liquids are best for you. Your provider may recommend that you drink an oral rehydration solution (ORS). ORS contains water, salts, and sugar that are needed to replace the lost body fluids. Ask what kind of ORS to use, how much to drink, and where to get it.      •Eat smaller meals, more often. Eat small amounts of food every 2 to 3 hours, even if you are not hungry. Food in your stomach may decrease your nausea.      •Control stress. Stress or anxiety can trigger an episode. Find ways to relax and manage your stress. Get more rest and sleep.       •Do not drink alcohol. Alcohol may upset or irritate your stomach. Too much alcohol can also cause nausea and vomiting.      •Do not use marijuana (cannabis). Repeated use of marijuana over a long period of time (chronic use) can cause episodes. This is called cannabis hyperemesis syndrome. If you have an episode caused by marijuana use, a hot shower may relieve your symptoms. Ask your healthcare provider for information if want to quit using marijuana and need help quitting.      Follow up with your healthcare provider as directed: Write down your questions so you remember to ask them during your visits.

## 2021-04-10 NOTE — ED PROVIDER NOTE - PHYSICAL EXAMINATION
Gen: Appears uncomfortable, actively vomiting into emesis bag  HEENT: Mucous membranes moist, pink conjunctivae, EOMI  CV: RRR, nl s1/s2.  Resp: CTAB, normal rate and effort  GI: Abdomen soft, diffusely ttp. No rebound, +voluntary guarding  : No CVAT  Neuro: A&O x 3, moving all 4 extremities  MSK: No spine or joint tenderness to palpation  Skin: No rashes. intact and perfused.

## 2021-04-10 NOTE — ED ADULT NURSE NOTE - OBJECTIVE STATEMENT
Pt AAOX3, Pt c/o abdominal pain, pt states it started about 6 hours ago, pt states having nausea and multiple episodes of vomiting, Pt is a daily marijuana smoker, states symptoms started 6 hours ago just after smoking. States he has cyclic vomiting syndrome 2/2 marijuana and symptoms feel similar to previous episodes. Pt with multiple previous visits for similar symptoms, pt respirations even and unlabored, pt able to move all extremities well

## 2021-04-10 NOTE — ED PROVIDER NOTE - PATIENT PORTAL LINK FT
You can access the FollowMyHealth Patient Portal offered by [facility] by registering at the following website: [email or web address]. By joining [employer]’s FollowMyHealth portal, you will also be able to view your health information using other applications (apps) compatible with our system.

## 2021-04-10 NOTE — ED PROVIDER NOTE - ATTENDING CONTRIBUTION TO CARE
37 yo M with hx of cyclic vomiting syndrome secondary to marijuana abuse presents to ED c/o generalized abd pain with assoc N/V/D x last 6 hrs.  No assoc fever, chills, hematochezia or hematemesis.  On exam pt awake and alert rocking on stretcher, vomiting. mm moist, Neck supple, Cor Reg, Lungs clear b/l, Abd soft with mild tenderness to palp, no rebound or rigidity, Ext FROM, no cyanosis or edema, Neuro non-focal.  will check labs and re-eval after IVF and meds

## 2021-04-10 NOTE — ED PROVIDER NOTE - PROGRESS NOTE DETAILS
Pt re-assessed, frequently spitting into bag still c/o nausea. Compazine and benadryl ordered. will re-assess Johnson: Pt still complaining of nausea.  Abdomen remains soft.  Will give additional zofran, fluids, reassess. [place]:  Pt feeling better.  occasionally spitting. no active vomiting. will d/c with outpatient f/up.

## 2021-04-10 NOTE — ED PROVIDER NOTE - OBJECTIVE STATEMENT
37yo male pmhx ETOH abuse, gastritis, colitis, alcoholic pancreatitis, gastric ulcer, cyclic vomiting syndrome presents to ED c/o abdominal pain, n/v/d x 6 hours. Pt is a daily marijuana smoker, states symptoms started 6 hours ago just after smoking. States he has cyclic vomiting syndrome 2/2 marijuana and symptoms feel similar to previous episodes. Pt with multiple previous visits for similar symptoms. Denies ETOH use, states he hasn't drank in several weeks. Also c/o diffuse abdominal pain, worse in LLQ and diarrhea. Denies fever, chills, bloody stools, cp, sob, recent travel, urinary sx.

## 2021-04-10 NOTE — ED ADULT TRIAGE NOTE - TEMPERATURE IN CELSIUS (DEGREES C)
pt a&ox4, repeat labs drawn, satting 98% on ra, in no acute distress, will continue to monitor. pt a&ox4, labs drawn from right AC 20g, satting 98% on ra, in no acute distress, will continue to monitor. 36.7

## 2021-05-31 ENCOUNTER — EMERGENCY (EMERGENCY)
Facility: HOSPITAL | Age: 36
LOS: 1 days | Discharge: HOME | End: 2021-05-31
Attending: EMERGENCY MEDICINE
Payer: MEDICAID

## 2021-05-31 VITALS
WEIGHT: 145.06 LBS | RESPIRATION RATE: 22 BRPM | DIASTOLIC BLOOD PRESSURE: 76 MMHG | HEIGHT: 71 IN | OXYGEN SATURATION: 97 % | HEART RATE: 82 BPM | TEMPERATURE: 98 F | SYSTOLIC BLOOD PRESSURE: 118 MMHG

## 2021-05-31 LAB
ALBUMIN SERPL ELPH-MCNC: 4.3 G/DL — SIGNIFICANT CHANGE UP (ref 3.3–5.2)
ALP SERPL-CCNC: 66 U/L — SIGNIFICANT CHANGE UP (ref 40–120)
ALT FLD-CCNC: 39 U/L — SIGNIFICANT CHANGE UP
ANION GAP SERPL CALC-SCNC: 14 MMOL/L — SIGNIFICANT CHANGE UP (ref 5–17)
AST SERPL-CCNC: 20 U/L — SIGNIFICANT CHANGE UP
BASOPHILS # BLD AUTO: 0.04 K/UL — SIGNIFICANT CHANGE UP (ref 0–0.2)
BASOPHILS NFR BLD AUTO: 0.3 % — SIGNIFICANT CHANGE UP (ref 0–2)
BILIRUB SERPL-MCNC: 0.3 MG/DL — LOW (ref 0.4–2)
BUN SERPL-MCNC: 9.4 MG/DL — SIGNIFICANT CHANGE UP (ref 8–20)
CALCIUM SERPL-MCNC: 9.4 MG/DL — SIGNIFICANT CHANGE UP (ref 8.6–10.2)
CHLORIDE SERPL-SCNC: 102 MMOL/L — SIGNIFICANT CHANGE UP (ref 98–107)
CO2 SERPL-SCNC: 23 MMOL/L — SIGNIFICANT CHANGE UP (ref 22–29)
CREAT SERPL-MCNC: 0.76 MG/DL — SIGNIFICANT CHANGE UP (ref 0.5–1.3)
EOSINOPHIL # BLD AUTO: 0.01 K/UL — SIGNIFICANT CHANGE UP (ref 0–0.5)
EOSINOPHIL NFR BLD AUTO: 0.1 % — SIGNIFICANT CHANGE UP (ref 0–6)
GLUCOSE SERPL-MCNC: 131 MG/DL — HIGH (ref 70–99)
HCT VFR BLD CALC: 41.2 % — SIGNIFICANT CHANGE UP (ref 39–50)
HGB BLD-MCNC: 14.5 G/DL — SIGNIFICANT CHANGE UP (ref 13–17)
IMM GRANULOCYTES NFR BLD AUTO: 0.4 % — SIGNIFICANT CHANGE UP (ref 0–1.5)
LIDOCAIN IGE QN: 17 U/L — LOW (ref 22–51)
LYMPHOCYTES # BLD AUTO: 0.57 K/UL — LOW (ref 1–3.3)
LYMPHOCYTES # BLD AUTO: 4.5 % — LOW (ref 13–44)
MAGNESIUM SERPL-MCNC: 1.4 MG/DL — LOW (ref 1.6–2.6)
MCHC RBC-ENTMCNC: 34.5 PG — HIGH (ref 27–34)
MCHC RBC-ENTMCNC: 35.2 GM/DL — SIGNIFICANT CHANGE UP (ref 32–36)
MCV RBC AUTO: 98.1 FL — SIGNIFICANT CHANGE UP (ref 80–100)
MONOCYTES # BLD AUTO: 0.56 K/UL — SIGNIFICANT CHANGE UP (ref 0–0.9)
MONOCYTES NFR BLD AUTO: 4.5 % — SIGNIFICANT CHANGE UP (ref 2–14)
NEUTROPHILS # BLD AUTO: 11.3 K/UL — HIGH (ref 1.8–7.4)
NEUTROPHILS NFR BLD AUTO: 90.2 % — HIGH (ref 43–77)
PLATELET # BLD AUTO: 239 K/UL — SIGNIFICANT CHANGE UP (ref 150–400)
POTASSIUM SERPL-MCNC: 3.3 MMOL/L — LOW (ref 3.5–5.3)
POTASSIUM SERPL-SCNC: 3.3 MMOL/L — LOW (ref 3.5–5.3)
PROT SERPL-MCNC: 6.6 G/DL — SIGNIFICANT CHANGE UP (ref 6.6–8.7)
RBC # BLD: 4.2 M/UL — SIGNIFICANT CHANGE UP (ref 4.2–5.8)
RBC # FLD: 11.7 % — SIGNIFICANT CHANGE UP (ref 10.3–14.5)
SODIUM SERPL-SCNC: 138 MMOL/L — SIGNIFICANT CHANGE UP (ref 135–145)
WBC # BLD: 12.53 K/UL — HIGH (ref 3.8–10.5)
WBC # FLD AUTO: 12.53 K/UL — HIGH (ref 3.8–10.5)

## 2021-05-31 PROCEDURE — 80053 COMPREHEN METABOLIC PANEL: CPT

## 2021-05-31 PROCEDURE — 96375 TX/PRO/DX INJ NEW DRUG ADDON: CPT

## 2021-05-31 PROCEDURE — 96374 THER/PROPH/DIAG INJ IV PUSH: CPT

## 2021-05-31 PROCEDURE — 83735 ASSAY OF MAGNESIUM: CPT

## 2021-05-31 PROCEDURE — 99284 EMERGENCY DEPT VISIT MOD MDM: CPT | Mod: 25

## 2021-05-31 PROCEDURE — 36415 COLL VENOUS BLD VENIPUNCTURE: CPT

## 2021-05-31 PROCEDURE — 85025 COMPLETE CBC W/AUTO DIFF WBC: CPT

## 2021-05-31 PROCEDURE — 99284 EMERGENCY DEPT VISIT MOD MDM: CPT

## 2021-05-31 PROCEDURE — 83690 ASSAY OF LIPASE: CPT

## 2021-05-31 RX ORDER — BUPRENORPHINE AND NALOXONE 2; .5 MG/1; MG/1
1 TABLET SUBLINGUAL ONCE
Refills: 0 | Status: DISCONTINUED | OUTPATIENT
Start: 2021-05-31 | End: 2021-05-31

## 2021-05-31 RX ORDER — SODIUM CHLORIDE 9 MG/ML
1000 INJECTION INTRAMUSCULAR; INTRAVENOUS; SUBCUTANEOUS ONCE
Refills: 0 | Status: COMPLETED | OUTPATIENT
Start: 2021-05-31 | End: 2021-05-31

## 2021-05-31 RX ORDER — DIPHENHYDRAMINE HCL 50 MG
25 CAPSULE ORAL ONCE
Refills: 0 | Status: COMPLETED | OUTPATIENT
Start: 2021-05-31 | End: 2021-05-31

## 2021-05-31 RX ORDER — ONDANSETRON 8 MG/1
4 TABLET, FILM COATED ORAL ONCE
Refills: 0 | Status: COMPLETED | OUTPATIENT
Start: 2021-05-31 | End: 2021-05-31

## 2021-05-31 RX ORDER — MAGNESIUM SULFATE 500 MG/ML
2 VIAL (ML) INJECTION ONCE
Refills: 0 | Status: COMPLETED | OUTPATIENT
Start: 2021-05-31 | End: 2021-05-31

## 2021-05-31 RX ORDER — PROCHLORPERAZINE MALEATE 5 MG
10 TABLET ORAL ONCE
Refills: 0 | Status: COMPLETED | OUTPATIENT
Start: 2021-05-31 | End: 2021-05-31

## 2021-05-31 RX ORDER — FAMOTIDINE 10 MG/ML
20 INJECTION INTRAVENOUS ONCE
Refills: 0 | Status: COMPLETED | OUTPATIENT
Start: 2021-05-31 | End: 2021-05-31

## 2021-05-31 RX ORDER — POTASSIUM CHLORIDE 20 MEQ
10 PACKET (EA) ORAL ONCE
Refills: 0 | Status: COMPLETED | OUTPATIENT
Start: 2021-05-31 | End: 2021-05-31

## 2021-05-31 RX ADMIN — Medication 30 MILLILITER(S): at 20:19

## 2021-05-31 RX ADMIN — Medication 50 MILLIEQUIVALENT(S): at 20:19

## 2021-05-31 RX ADMIN — ONDANSETRON 4 MILLIGRAM(S): 8 TABLET, FILM COATED ORAL at 20:19

## 2021-05-31 RX ADMIN — Medication 104 MILLIGRAM(S): at 16:56

## 2021-05-31 RX ADMIN — FAMOTIDINE 20 MILLIGRAM(S): 10 INJECTION INTRAVENOUS at 16:56

## 2021-05-31 RX ADMIN — SODIUM CHLORIDE 1000 MILLILITER(S): 9 INJECTION INTRAMUSCULAR; INTRAVENOUS; SUBCUTANEOUS at 16:56

## 2021-05-31 RX ADMIN — Medication 75 MILLIGRAM(S): at 18:12

## 2021-05-31 RX ADMIN — BUPRENORPHINE AND NALOXONE 1 TABLET(S): 2; .5 TABLET SUBLINGUAL at 18:12

## 2021-05-31 RX ADMIN — Medication 25 MILLIGRAM(S): at 16:56

## 2021-05-31 RX ADMIN — Medication 50 GRAM(S): at 18:52

## 2021-05-31 NOTE — ED PROVIDER NOTE - CONSTITUTIONAL, MLM
normal... uncomfortable appearing, awake, alert, oriented to person, place, time/situation and in no apparent distress.

## 2021-05-31 NOTE — ED PROVIDER NOTE - CLINICAL SUMMARY MEDICAL DECISION MAKING FREE TEXT BOX
pt with opiate and benzo wd with cyclic vomiting history. will obtain labs, ivf, symptoms control reassess

## 2021-05-31 NOTE — ED ADULT TRIAGE NOTE - CHIEF COMPLAINT QUOTE
Pt withdrawing from Xanax and heroine last used 3 days ago and pt reports 2 days of increasing nausea and vomiting with abd pain. Pt received 4mg Zofran en route.

## 2021-05-31 NOTE — ED PROVIDER NOTE - PATIENT PORTAL LINK FT
You can access the FollowMyHealth Patient Portal offered by [facility] by registering at the following website: [email or web address]. By joining [employer]’s FollowMyHealth portal, you will also be able to view your health information using other applications (apps) compatible with our system.

## 2021-05-31 NOTE — ED PROVIDER NOTE - OBJECTIVE STATEMENT
Patient is a 36 year old male with history of polysubstance abuse, cyclic vomiting, gastritis, pancreatitis presenting with epigastric pain, nausea, vomiting, weakness. Stopped heroin, alcohol, and xanax several days ago and symptoms began shortly after. Usually snorts heroin. No meds for symptoms at home. Given zofran by EMS. Pt denies any trauma. Pt has been seen here for the same many times in past. No blood in vomit. no black or bloody stool.

## 2021-05-31 NOTE — ED ADULT NURSE NOTE - NS ED NURSE LEVEL OF CONSCIOUSNESS SPEECH
The patient is a 72-year-old female, , retired (, non-caregiver domiciled at home with her  with a prior psychiatric diagnosis of ANSON and MDD, 2 previous inpatient psychiatric hospitalizations (most recently [facility] 2/2020), prior SA in 11/2019 by overdosing on pills, no history of NSSIB, no history of violence/aggression, no hx of legal issues, daily marijuana use, PMH of HTN, BIB self due to worsening depression and anxiety in the setting of death of long time therapist one month ago.    On encounter today, patient continues to report anxiety, states Ativan is not helping and wants to go back on Clonazepam, worries that she will never get better. Patient denies active SI/HI.  Plan: Will increase Mirtazapine to 30mg at bedtime, c/w Venlafaxine ER 150mg and d/c Ativan and switch to Clonazepam 1mg daily and 05mg at bedtime. Will also add PRN Clonazepam. Patient once again encouraged to participate in group and individual therapy.  Speaking Coherently

## 2021-06-01 ENCOUNTER — OUTPATIENT (OUTPATIENT)
Dept: OUTPATIENT SERVICES | Facility: HOSPITAL | Age: 36
LOS: 1 days | End: 2021-06-01
Payer: MEDICAID

## 2021-06-14 ENCOUNTER — EMERGENCY (EMERGENCY)
Facility: HOSPITAL | Age: 36
LOS: 1 days | Discharge: HOME | End: 2021-06-14
Attending: EMERGENCY MEDICINE
Payer: MEDICAID

## 2021-06-14 VITALS
OXYGEN SATURATION: 100 % | SYSTOLIC BLOOD PRESSURE: 144 MMHG | HEART RATE: 60 BPM | RESPIRATION RATE: 20 BRPM | DIASTOLIC BLOOD PRESSURE: 67 MMHG

## 2021-06-14 VITALS
HEART RATE: 73 BPM | HEIGHT: 71 IN | OXYGEN SATURATION: 98 % | RESPIRATION RATE: 18 BRPM | SYSTOLIC BLOOD PRESSURE: 122 MMHG | TEMPERATURE: 98 F | DIASTOLIC BLOOD PRESSURE: 74 MMHG

## 2021-06-14 LAB
ALBUMIN SERPL ELPH-MCNC: 3.9 G/DL — SIGNIFICANT CHANGE UP (ref 3.3–5.2)
ALP SERPL-CCNC: 59 U/L — SIGNIFICANT CHANGE UP (ref 40–120)
ALT FLD-CCNC: 90 U/L — HIGH
AMPHET UR-MCNC: NEGATIVE — SIGNIFICANT CHANGE UP
ANION GAP SERPL CALC-SCNC: 7 MMOL/L — SIGNIFICANT CHANGE UP (ref 5–17)
APAP SERPL-MCNC: <3 UG/ML — LOW (ref 10–26)
APPEARANCE UR: CLEAR — SIGNIFICANT CHANGE UP
AST SERPL-CCNC: 33 U/L — SIGNIFICANT CHANGE UP
BARBITURATES UR SCN-MCNC: NEGATIVE — SIGNIFICANT CHANGE UP
BASOPHILS # BLD AUTO: 0.03 K/UL — SIGNIFICANT CHANGE UP (ref 0–0.2)
BASOPHILS NFR BLD AUTO: 0.4 % — SIGNIFICANT CHANGE UP (ref 0–2)
BENZODIAZ UR-MCNC: POSITIVE
BILIRUB SERPL-MCNC: 0.3 MG/DL — LOW (ref 0.4–2)
BILIRUB UR-MCNC: NEGATIVE — SIGNIFICANT CHANGE UP
BUN SERPL-MCNC: 13.4 MG/DL — SIGNIFICANT CHANGE UP (ref 8–20)
CALCIUM SERPL-MCNC: 9.3 MG/DL — SIGNIFICANT CHANGE UP (ref 8.6–10.2)
CHLORIDE SERPL-SCNC: 98 MMOL/L — SIGNIFICANT CHANGE UP (ref 98–107)
CO2 SERPL-SCNC: 32 MMOL/L — HIGH (ref 22–29)
COCAINE METAB.OTHER UR-MCNC: POSITIVE
COLOR SPEC: YELLOW — SIGNIFICANT CHANGE UP
COVID-19 SPIKE DOMAIN AB INTERP: POSITIVE
COVID-19 SPIKE DOMAIN ANTIBODY RESULT: 10.1 U/ML — HIGH
CREAT SERPL-MCNC: 0.63 MG/DL — SIGNIFICANT CHANGE UP (ref 0.5–1.3)
DIFF PNL FLD: NEGATIVE — SIGNIFICANT CHANGE UP
EOSINOPHIL # BLD AUTO: 0.43 K/UL — SIGNIFICANT CHANGE UP (ref 0–0.5)
EOSINOPHIL NFR BLD AUTO: 6.1 % — HIGH (ref 0–6)
ETHANOL SERPL-MCNC: <10 MG/DL — SIGNIFICANT CHANGE UP (ref 0–9)
GLUCOSE SERPL-MCNC: 119 MG/DL — HIGH (ref 70–99)
GLUCOSE UR QL: NEGATIVE MG/DL — SIGNIFICANT CHANGE UP
HCT VFR BLD CALC: 37.7 % — LOW (ref 39–50)
HGB BLD-MCNC: 13.1 G/DL — SIGNIFICANT CHANGE UP (ref 13–17)
IMM GRANULOCYTES NFR BLD AUTO: 0.3 % — SIGNIFICANT CHANGE UP (ref 0–1.5)
KETONES UR-MCNC: NEGATIVE — SIGNIFICANT CHANGE UP
LEUKOCYTE ESTERASE UR-ACNC: NEGATIVE — SIGNIFICANT CHANGE UP
LYMPHOCYTES # BLD AUTO: 1.32 K/UL — SIGNIFICANT CHANGE UP (ref 1–3.3)
LYMPHOCYTES # BLD AUTO: 18.6 % — SIGNIFICANT CHANGE UP (ref 13–44)
MCHC RBC-ENTMCNC: 34.7 GM/DL — SIGNIFICANT CHANGE UP (ref 32–36)
MCHC RBC-ENTMCNC: 34.9 PG — HIGH (ref 27–34)
MCV RBC AUTO: 100.5 FL — HIGH (ref 80–100)
METHADONE UR-MCNC: NEGATIVE — SIGNIFICANT CHANGE UP
MONOCYTES # BLD AUTO: 0.79 K/UL — SIGNIFICANT CHANGE UP (ref 0–0.9)
MONOCYTES NFR BLD AUTO: 11.2 % — SIGNIFICANT CHANGE UP (ref 2–14)
NEUTROPHILS # BLD AUTO: 4.49 K/UL — SIGNIFICANT CHANGE UP (ref 1.8–7.4)
NEUTROPHILS NFR BLD AUTO: 63.4 % — SIGNIFICANT CHANGE UP (ref 43–77)
NITRITE UR-MCNC: NEGATIVE — SIGNIFICANT CHANGE UP
OPIATES UR-MCNC: POSITIVE
PCP SPEC-MCNC: SIGNIFICANT CHANGE UP
PCP UR-MCNC: NEGATIVE — SIGNIFICANT CHANGE UP
PH UR: 6.5 — SIGNIFICANT CHANGE UP (ref 5–8)
PLATELET # BLD AUTO: 203 K/UL — SIGNIFICANT CHANGE UP (ref 150–400)
POTASSIUM SERPL-MCNC: 3.8 MMOL/L — SIGNIFICANT CHANGE UP (ref 3.5–5.3)
POTASSIUM SERPL-SCNC: 3.8 MMOL/L — SIGNIFICANT CHANGE UP (ref 3.5–5.3)
PROT SERPL-MCNC: 6.2 G/DL — LOW (ref 6.6–8.7)
PROT UR-MCNC: 15 MG/DL
RBC # BLD: 3.75 M/UL — LOW (ref 4.2–5.8)
RBC # FLD: 11.9 % — SIGNIFICANT CHANGE UP (ref 10.3–14.5)
SALICYLATES SERPL-MCNC: <0.6 MG/DL — LOW (ref 10–20)
SARS-COV-2 IGG+IGM SERPL QL IA: 10.1 U/ML — HIGH
SARS-COV-2 IGG+IGM SERPL QL IA: POSITIVE
SARS-COV-2 RNA SPEC QL NAA+PROBE: SIGNIFICANT CHANGE UP
SODIUM SERPL-SCNC: 137 MMOL/L — SIGNIFICANT CHANGE UP (ref 135–145)
SP GR SPEC: 1.01 — SIGNIFICANT CHANGE UP (ref 1.01–1.02)
THC UR QL: POSITIVE
UROBILINOGEN FLD QL: NEGATIVE MG/DL — SIGNIFICANT CHANGE UP
WBC # BLD: 7.08 K/UL — SIGNIFICANT CHANGE UP (ref 3.8–10.5)
WBC # FLD AUTO: 7.08 K/UL — SIGNIFICANT CHANGE UP (ref 3.8–10.5)

## 2021-06-14 PROCEDURE — 36415 COLL VENOUS BLD VENIPUNCTURE: CPT

## 2021-06-14 PROCEDURE — 81001 URINALYSIS AUTO W/SCOPE: CPT

## 2021-06-14 PROCEDURE — 85025 COMPLETE CBC W/AUTO DIFF WBC: CPT

## 2021-06-14 PROCEDURE — 86769 SARS-COV-2 COVID-19 ANTIBODY: CPT

## 2021-06-14 PROCEDURE — 96374 THER/PROPH/DIAG INJ IV PUSH: CPT

## 2021-06-14 PROCEDURE — 72125 CT NECK SPINE W/O DYE: CPT

## 2021-06-14 PROCEDURE — 70450 CT HEAD/BRAIN W/O DYE: CPT | Mod: 26

## 2021-06-14 PROCEDURE — 72125 CT NECK SPINE W/O DYE: CPT | Mod: 26

## 2021-06-14 PROCEDURE — 96375 TX/PRO/DX INJ NEW DRUG ADDON: CPT

## 2021-06-14 PROCEDURE — 70450 CT HEAD/BRAIN W/O DYE: CPT

## 2021-06-14 PROCEDURE — 99284 EMERGENCY DEPT VISIT MOD MDM: CPT | Mod: 25

## 2021-06-14 PROCEDURE — U0005: CPT

## 2021-06-14 PROCEDURE — 80307 DRUG TEST PRSMV CHEM ANLYZR: CPT

## 2021-06-14 PROCEDURE — 93010 ELECTROCARDIOGRAM REPORT: CPT

## 2021-06-14 PROCEDURE — 96376 TX/PRO/DX INJ SAME DRUG ADON: CPT

## 2021-06-14 PROCEDURE — 80053 COMPREHEN METABOLIC PANEL: CPT

## 2021-06-14 PROCEDURE — 99285 EMERGENCY DEPT VISIT HI MDM: CPT

## 2021-06-14 PROCEDURE — 93005 ELECTROCARDIOGRAM TRACING: CPT

## 2021-06-14 PROCEDURE — U0003: CPT

## 2021-06-14 RX ORDER — KETOROLAC TROMETHAMINE 30 MG/ML
15 SYRINGE (ML) INJECTION ONCE
Refills: 0 | Status: DISCONTINUED | OUTPATIENT
Start: 2021-06-14 | End: 2021-06-14

## 2021-06-14 RX ORDER — ONDANSETRON 8 MG/1
4 TABLET, FILM COATED ORAL ONCE
Refills: 0 | Status: COMPLETED | OUTPATIENT
Start: 2021-06-14 | End: 2021-06-14

## 2021-06-14 RX ORDER — ALPRAZOLAM 0.25 MG
0.5 TABLET ORAL ONCE
Refills: 0 | Status: DISCONTINUED | OUTPATIENT
Start: 2021-06-14 | End: 2021-06-14

## 2021-06-14 RX ORDER — ONDANSETRON 8 MG/1
1 TABLET, FILM COATED ORAL
Qty: 9 | Refills: 0
Start: 2021-06-14

## 2021-06-14 RX ORDER — BUPRENORPHINE AND NALOXONE 2; .5 MG/1; MG/1
1 TABLET SUBLINGUAL ONCE
Refills: 0 | Status: DISCONTINUED | OUTPATIENT
Start: 2021-06-14 | End: 2021-06-14

## 2021-06-14 RX ORDER — DIAZEPAM 5 MG
5 TABLET ORAL ONCE
Refills: 0 | Status: DISCONTINUED | OUTPATIENT
Start: 2021-06-14 | End: 2021-06-14

## 2021-06-14 RX ADMIN — BUPRENORPHINE AND NALOXONE 1 TABLET(S): 2; .5 TABLET SUBLINGUAL at 13:12

## 2021-06-14 RX ADMIN — Medication 5 MILLIGRAM(S): at 08:37

## 2021-06-14 RX ADMIN — Medication 5 MILLIGRAM(S): at 04:01

## 2021-06-14 RX ADMIN — Medication 15 MILLIGRAM(S): at 07:58

## 2021-06-14 RX ADMIN — Medication 0.5 MILLIGRAM(S): at 12:15

## 2021-06-14 RX ADMIN — ONDANSETRON 4 MILLIGRAM(S): 8 TABLET, FILM COATED ORAL at 07:57

## 2021-06-14 RX ADMIN — ONDANSETRON 4 MILLIGRAM(S): 8 TABLET, FILM COATED ORAL at 13:11

## 2021-06-14 RX ADMIN — Medication 0.1 MILLIGRAM(S): at 14:04

## 2021-06-14 RX ADMIN — Medication 15 MILLIGRAM(S): at 11:15

## 2021-06-14 RX ADMIN — ONDANSETRON 4 MILLIGRAM(S): 8 TABLET, FILM COATED ORAL at 04:01

## 2021-06-14 NOTE — SBIRT NOTE ADULT - NSSBIRTBRIEFINTDET_GEN_A_CORE
Screening results were reviewed with the patient and patient was provided information about healthy guidelines and potential negative consequences associated with level of risk. Motivation and readiness to reduce or stop use was discussed and goals and activities to make changes were suggested/offered. Patient reports he did phone screening at [facility], spoke with Kit. HC will call and verify referral.

## 2021-06-14 NOTE — CHART NOTE - NSCHARTNOTEFT_GEN_A_CORE
SOCIAL WORK NOTE:  THIS WORKER SPOKE WITH SBIRT AND MD. PATIENT ACCEPTED TO [facility] FOR INPATIENT REHAB TODAY.  AS PER MD 3 RX WERE CALLED DOWN TO [facility] -CLONODINE, ZOFRAN AND LIBRIUM.  [facility] TO SEND TRANSPORTATION TO PICKUP THE PATIENT AROUND 3-3:30 PM.  [facility] CONFIRMED ,MEDS ARE READY TO BE PICKED UP.
SOCIAL WORK NOTE:  DISCUSSED CASE WITH MD. PATIENT INTERESTED IN DETOX SERVICES. [facility] REFERRAL WAS MADE TO SBIRT TEAM FOR SERVICES. SOCIAL WORK TO FOLLOW.
SOCIAL WORK NOTE:  RECEIVED CALL FROM CHEN AT [facility] REQUESTING TO FAX CLINICALS TO [facility] FOR THE PATIENT. SAME COMPELTED AT FAX # [phone].  SW TO FOLLOW IF NEEDED.

## 2021-06-14 NOTE — ED PROVIDER NOTE - OBJECTIVE STATEMENT
37 y/o male with PMHx of Anxiety, Colitis, ETOH abuse, Gastritis, Pancreatitis, and Stomach ulcer presents to ED c/o withdrawals. Patient reports he is set to go to detox at 9 am for Heroin and xanax, but woke up with N/V, blurry vision, and states he fell and hit his head. States his last use of heroin (uses 1 gram at a time) was 6 hours ago, and Xanax (uses everyday, 10mg 5 sticks at a time) last use was 12 hours ago. 35 y/o male with PMHx of Anxiety, Colitis, ETOH abuse, Gastritis, Pancreatitis, and Stomach ulcer presents to ED c/o withdrawal symptoms. Patient reports he is set to go to a detox program at 9 am somewhere in [place] for Heroin and xanax, stating the organization was going to call him to confirm and arrange for pick-up. However, he woke up with N/V, blurry vision, body aches, and states he fell and hit his head. States his last use of heroin (uses 1 gram at a time) was 6 hours ago, and Xanax (uses everyday, 10mg 5 sticks at a time) last use was 12 hours ago.

## 2021-06-14 NOTE — ED PROVIDER NOTE - PATIENT PORTAL LINK FT
You can access the FollowMyHealth Patient Portal offered by [facility] by registering at the following website: [email or web address]. By joining [employer]’s FollowMyHealth portal, you will also be able to view your health information using other applications (apps) compatible with our system.

## 2021-06-14 NOTE — ED ADULT NURSE NOTE - INTERVENTIONS DEFINITIONS
[place] to call system/Call bell, personal items and telephone within reach/Instruct patient to call for assistance/Stretcher in lowest position, wheels locked, appropriate side rails in place/Monitor gait and stability/Monitor for mental status changes and reorient to person, place, and time/Reinforce activity limits and safety measures with patient and family

## 2021-06-14 NOTE — SBIRT NOTE ADULT - NSSBIRTDRGBRIEFINTDET_GEN_A_CORE
Screening results were reviewed with the patient and patient was provided information about healthy guidelines and potential negative consequences associated with level of risk. Motivation and readiness to reduce or stop use was discussed and goals and activities to make changes were suggested/offered. Patient reports he did phone screening at [facility], spoke with Kit. HC will call and verify referral.

## 2021-06-14 NOTE — ED PROVIDER NOTE - PROGRESS NOTE DETAILS
Patient feels somewhat better. Still complaining of body aches and nausea. Dk: reassessed - patient with multi-substance withdrawal; accepted to detox center; will work with [facility] to attempt to dispense 2 days worth of meds at [facility] request; will not send Rx for suboxone however; otherwise safe for d/c, facility to pick patient up at approx 3-4pm.

## 2021-06-14 NOTE — ED PROVIDER NOTE - MUSCULOSKELETAL, MLM
Spine appears normal, range of motion is not limited, no muscle or joint tenderness; piloerection to bilateral upper extremities

## 2021-06-14 NOTE — ED PROVIDER NOTE - CARE PLAN
Principal Discharge DX:	Benzodiazepine withdrawal without complication  Secondary Diagnosis:	Heroin withdrawal

## 2021-06-14 NOTE — ED ADULT NURSE REASSESSMENT NOTE - NS ED NURSE REASSESS COMMENT FT1
assumed care of pt @ this time. received pt a&ox3, no acute distress, breaths even and unlabored c/o L sided abdominal pain with nausea and feeling anxious. VSS. ekg being performed at this time. pt requesting ice chips, pt given ice chips, tolerating well at this time. pt to be medicated as ordered. will continue to reassess.

## 2021-06-14 NOTE — SBIRT NOTE ADULT - NSSBIRTALCACTIVEREFTXDET_GEN_A_CORE
Screening results were reviewed with the patient and patient was provided information about healthy guidelines and potential negative consequences associated with level of risk. Motivation and readiness to reduce or stop use was discussed and goals and activities to make changes were suggested/offered. Patient reports he did phone screening at [facility], spoke with Kit. HC will call and verify referral.

## 2021-06-17 DIAGNOSIS — Z71.89 OTHER SPECIFIED COUNSELING: ICD-10-CM

## 2021-07-01 PROCEDURE — G9005: CPT

## 2021-07-03 ENCOUNTER — EMERGENCY (EMERGENCY)
Facility: HOSPITAL | Age: 36
LOS: 1 days | Discharge: HOME | End: 2021-07-03
Attending: EMERGENCY MEDICINE
Payer: MEDICAID

## 2021-07-03 VITALS
TEMPERATURE: 98 F | SYSTOLIC BLOOD PRESSURE: 133 MMHG | HEART RATE: 85 BPM | OXYGEN SATURATION: 98 % | DIASTOLIC BLOOD PRESSURE: 65 MMHG | RESPIRATION RATE: 19 BRPM

## 2021-07-03 VITALS
DIASTOLIC BLOOD PRESSURE: 90 MMHG | HEIGHT: 71 IN | TEMPERATURE: 98 F | OXYGEN SATURATION: 99 % | RESPIRATION RATE: 22 BRPM | WEIGHT: 154.98 LBS | HEART RATE: 88 BPM | SYSTOLIC BLOOD PRESSURE: 145 MMHG

## 2021-07-03 LAB
ALBUMIN SERPL ELPH-MCNC: 4.5 G/DL — SIGNIFICANT CHANGE UP (ref 3.3–5.2)
ALP SERPL-CCNC: 64 U/L — SIGNIFICANT CHANGE UP (ref 40–120)
ALT FLD-CCNC: 34 U/L — SIGNIFICANT CHANGE UP
ANION GAP SERPL CALC-SCNC: 17 MMOL/L — SIGNIFICANT CHANGE UP (ref 5–17)
AST SERPL-CCNC: 42 U/L — HIGH
BASOPHILS # BLD AUTO: 0.05 K/UL — SIGNIFICANT CHANGE UP (ref 0–0.2)
BASOPHILS NFR BLD AUTO: 0.3 % — SIGNIFICANT CHANGE UP (ref 0–2)
BILIRUB SERPL-MCNC: 0.4 MG/DL — SIGNIFICANT CHANGE UP (ref 0.4–2)
BUN SERPL-MCNC: 19.9 MG/DL — SIGNIFICANT CHANGE UP (ref 8–20)
CALCIUM SERPL-MCNC: 9.5 MG/DL — SIGNIFICANT CHANGE UP (ref 8.6–10.2)
CHLORIDE SERPL-SCNC: 100 MMOL/L — SIGNIFICANT CHANGE UP (ref 98–107)
CO2 SERPL-SCNC: 23 MMOL/L — SIGNIFICANT CHANGE UP (ref 22–29)
CREAT SERPL-MCNC: 0.83 MG/DL — SIGNIFICANT CHANGE UP (ref 0.5–1.3)
EOSINOPHIL # BLD AUTO: 0.04 K/UL — SIGNIFICANT CHANGE UP (ref 0–0.5)
EOSINOPHIL NFR BLD AUTO: 0.3 % — SIGNIFICANT CHANGE UP (ref 0–6)
ETHANOL SERPL-MCNC: <10 MG/DL — SIGNIFICANT CHANGE UP (ref 0–9)
GLUCOSE SERPL-MCNC: 138 MG/DL — HIGH (ref 70–99)
HCT VFR BLD CALC: 43.5 % — SIGNIFICANT CHANGE UP (ref 39–50)
HGB BLD-MCNC: 15.8 G/DL — SIGNIFICANT CHANGE UP (ref 13–17)
IMM GRANULOCYTES NFR BLD AUTO: 0.5 % — SIGNIFICANT CHANGE UP (ref 0–1.5)
LIDOCAIN IGE QN: 26 U/L — SIGNIFICANT CHANGE UP (ref 22–51)
LYMPHOCYTES # BLD AUTO: 1.45 K/UL — SIGNIFICANT CHANGE UP (ref 1–3.3)
LYMPHOCYTES # BLD AUTO: 9.5 % — LOW (ref 13–44)
MCHC RBC-ENTMCNC: 34.3 PG — HIGH (ref 27–34)
MCHC RBC-ENTMCNC: 36.3 GM/DL — HIGH (ref 32–36)
MCV RBC AUTO: 94.4 FL — SIGNIFICANT CHANGE UP (ref 80–100)
MONOCYTES # BLD AUTO: 0.69 K/UL — SIGNIFICANT CHANGE UP (ref 0–0.9)
MONOCYTES NFR BLD AUTO: 4.5 % — SIGNIFICANT CHANGE UP (ref 2–14)
NEUTROPHILS # BLD AUTO: 12.92 K/UL — HIGH (ref 1.8–7.4)
NEUTROPHILS NFR BLD AUTO: 84.9 % — HIGH (ref 43–77)
PLATELET # BLD AUTO: 294 K/UL — SIGNIFICANT CHANGE UP (ref 150–400)
POTASSIUM SERPL-MCNC: 3.3 MMOL/L — LOW (ref 3.5–5.3)
POTASSIUM SERPL-SCNC: 3.3 MMOL/L — LOW (ref 3.5–5.3)
PROT SERPL-MCNC: 7.1 G/DL — SIGNIFICANT CHANGE UP (ref 6.6–8.7)
RBC # BLD: 4.61 M/UL — SIGNIFICANT CHANGE UP (ref 4.2–5.8)
RBC # FLD: 11.5 % — SIGNIFICANT CHANGE UP (ref 10.3–14.5)
SODIUM SERPL-SCNC: 139 MMOL/L — SIGNIFICANT CHANGE UP (ref 135–145)
WBC # BLD: 15.22 K/UL — HIGH (ref 3.8–10.5)
WBC # FLD AUTO: 15.22 K/UL — HIGH (ref 3.8–10.5)

## 2021-07-03 PROCEDURE — 74177 CT ABD & PELVIS W/CONTRAST: CPT | Mod: 26,MD

## 2021-07-03 PROCEDURE — 99284 EMERGENCY DEPT VISIT MOD MDM: CPT | Mod: 25

## 2021-07-03 PROCEDURE — 96372 THER/PROPH/DIAG INJ SC/IM: CPT | Mod: XU

## 2021-07-03 PROCEDURE — 83690 ASSAY OF LIPASE: CPT

## 2021-07-03 PROCEDURE — 96376 TX/PRO/DX INJ SAME DRUG ADON: CPT

## 2021-07-03 PROCEDURE — 80053 COMPREHEN METABOLIC PANEL: CPT

## 2021-07-03 PROCEDURE — 85025 COMPLETE CBC W/AUTO DIFF WBC: CPT

## 2021-07-03 PROCEDURE — 80307 DRUG TEST PRSMV CHEM ANLYZR: CPT

## 2021-07-03 PROCEDURE — 71045 X-RAY EXAM CHEST 1 VIEW: CPT

## 2021-07-03 PROCEDURE — 36415 COLL VENOUS BLD VENIPUNCTURE: CPT

## 2021-07-03 PROCEDURE — 96375 TX/PRO/DX INJ NEW DRUG ADDON: CPT

## 2021-07-03 PROCEDURE — 96374 THER/PROPH/DIAG INJ IV PUSH: CPT | Mod: XU

## 2021-07-03 PROCEDURE — 99284 EMERGENCY DEPT VISIT MOD MDM: CPT

## 2021-07-03 PROCEDURE — 71045 X-RAY EXAM CHEST 1 VIEW: CPT | Mod: 26

## 2021-07-03 PROCEDURE — 74177 CT ABD & PELVIS W/CONTRAST: CPT

## 2021-07-03 RX ORDER — MORPHINE SULFATE 50 MG/1
4 CAPSULE, EXTENDED RELEASE ORAL ONCE
Refills: 0 | Status: DISCONTINUED | OUTPATIENT
Start: 2021-07-03 | End: 2021-07-03

## 2021-07-03 RX ORDER — HALOPERIDOL DECANOATE 100 MG/ML
5 INJECTION INTRAMUSCULAR ONCE
Refills: 0 | Status: COMPLETED | OUTPATIENT
Start: 2021-07-03 | End: 2021-07-03

## 2021-07-03 RX ORDER — POTASSIUM CHLORIDE 20 MEQ
10 PACKET (EA) ORAL
Refills: 0 | Status: COMPLETED | OUTPATIENT
Start: 2021-07-03 | End: 2021-07-03

## 2021-07-03 RX ORDER — HYDROMORPHONE HYDROCHLORIDE 2 MG/ML
1 INJECTION INTRAMUSCULAR; INTRAVENOUS; SUBCUTANEOUS ONCE
Refills: 0 | Status: DISCONTINUED | OUTPATIENT
Start: 2021-07-03 | End: 2021-07-03

## 2021-07-03 RX ORDER — KETOROLAC TROMETHAMINE 30 MG/ML
15 SYRINGE (ML) INJECTION ONCE
Refills: 0 | Status: DISCONTINUED | OUTPATIENT
Start: 2021-07-03 | End: 2021-07-03

## 2021-07-03 RX ORDER — HYDROMORPHONE HYDROCHLORIDE 2 MG/ML
0.5 INJECTION INTRAMUSCULAR; INTRAVENOUS; SUBCUTANEOUS ONCE
Refills: 0 | Status: DISCONTINUED | OUTPATIENT
Start: 2021-07-03 | End: 2021-07-03

## 2021-07-03 RX ORDER — ONDANSETRON 8 MG/1
4 TABLET, FILM COATED ORAL ONCE
Refills: 0 | Status: COMPLETED | OUTPATIENT
Start: 2021-07-03 | End: 2021-07-03

## 2021-07-03 RX ORDER — METOCLOPRAMIDE HCL 10 MG
10 TABLET ORAL ONCE
Refills: 0 | Status: COMPLETED | OUTPATIENT
Start: 2021-07-03 | End: 2021-07-03

## 2021-07-03 RX ADMIN — HYDROMORPHONE HYDROCHLORIDE 0.5 MILLIGRAM(S): 2 INJECTION INTRAMUSCULAR; INTRAVENOUS; SUBCUTANEOUS at 19:31

## 2021-07-03 RX ADMIN — MORPHINE SULFATE 4 MILLIGRAM(S): 50 CAPSULE, EXTENDED RELEASE ORAL at 17:59

## 2021-07-03 RX ADMIN — Medication 10 MILLIGRAM(S): at 17:59

## 2021-07-03 RX ADMIN — ONDANSETRON 4 MILLIGRAM(S): 8 TABLET, FILM COATED ORAL at 17:35

## 2021-07-03 RX ADMIN — Medication 15 MILLIGRAM(S): at 19:31

## 2021-07-03 RX ADMIN — ONDANSETRON 4 MILLIGRAM(S): 8 TABLET, FILM COATED ORAL at 19:32

## 2021-07-03 RX ADMIN — MORPHINE SULFATE 4 MILLIGRAM(S): 50 CAPSULE, EXTENDED RELEASE ORAL at 17:35

## 2021-07-03 RX ADMIN — HALOPERIDOL DECANOATE 5 MILLIGRAM(S): 100 INJECTION INTRAMUSCULAR at 21:20

## 2021-07-03 RX ADMIN — HYDROMORPHONE HYDROCHLORIDE 1 MILLIGRAM(S): 2 INJECTION INTRAMUSCULAR; INTRAVENOUS; SUBCUTANEOUS at 19:31

## 2021-07-03 NOTE — ED ADULT NURSE REASSESSMENT NOTE - NS ED NURSE REASSESS COMMENT FT1
Patient still vomiting after zofran and pain getting worse, Dr. Ford contacted given additional morphine with reglan.

## 2021-07-03 NOTE — ED PROVIDER NOTE - NS ED ROS FT
Constitutional: no fever, no sweats, and no chills.  CV: no chest pain, no edema.  Resp: no cough, no dyspnea  GI: +abdominal pain, +nausea and +vomiting.  MSK: no msk pain, no weakness  Skin: no lesions, and no rashes.  Neuro: no headache, no sensory deficits, and no dizziness  ROS otherwise negative except as noted in HPI.

## 2021-07-03 NOTE — ED PROVIDER NOTE - PATIENT PORTAL LINK FT
You can access the FollowMyHealth Patient Portal offered by [facility] by registering at the following website: [email or web address]. By joining [employer]’s FollowMyHealth portal, you will also be able to view your health information using other applications (apps) compatible with our system.

## 2021-07-03 NOTE — ED PROVIDER NOTE - OBJECTIVE STATEMENT
35yo M w/pmh cyclic vomiting syndrome,  Anxiety, Colitis, ETOH abuse, Gastritis, Pancreatitis, and Stomach ulcer presents for abdominal pain and n/v. Onset this morning, gradually. Describes as left-sided, nonradiating, severe. Assoc/w nausea, NBNB vomiting. Denies CP, SOB. Unable to tolerate PO. 37yo M w/pmh cyclic vomiting syndrome,  Anxiety, Colitis, ETOH abuse, Gastritis, Pancreatitis, and Stomach ulcer presents for abdominal pain and n/v. Onset this morning, gradually. Describes as left-sided, nonradiating, severe. Assoc/w nausea, NBNB vomiting. Denies CP, SOB. Unable to tolerate PO. Reports he quit EtOH use 14d ago. 37yo M w/pmh cyclic vomiting syndrome,  Anxiety, Colitis, ETOH abuse, Gastritis, Pancreatitis, and Stomach ulcer presents for abdominal pain and n/v. Onset this morning, gradually. Describes as left-sided, nonradiating, severe. Assoc/w nausea, NBNB vomiting. Denies CP, SOB. Unable to tolerate PO. Reports he quit EtOH and marijuana use 14d ago.

## 2021-07-03 NOTE — ED PROVIDER NOTE - ATTENDING CONTRIBUTION TO CARE
0 37yo M w/pmh cyclic vomiting syndrome,  Anxiety, Colitis, ETOH abuse, Pancreatitis, and Stomach ulcer presents for abdominal pain and n/v since yesterday. a/w subjective fever. Went to UC yesterday and was started on antibiotics for a fever? Reports today pain worsened, left sided/nonradiating. Denies testicular pain or swelling. Reports last usage of alcohol 2 weeks ago. Denies abd surgeries.   AP - uncomfortable appearing, left sided tenderness. will get labs, CT eval for infectious etiology. pain control. reassess

## 2021-07-03 NOTE — ED ADULT NURSE NOTE - OBJECTIVE STATEMENT
37 y/o male presents to ED with LUQ abdominal pain that started yesterday, was seen yesterday at Urgent Care given clindamycin, reports he hasn't drank alcohol in 14 days.  Patient reports fevers, chills, nausea, vomiting.

## 2021-07-03 NOTE — ED PROVIDER NOTE - PHYSICAL EXAMINATION
General: well appearing, NAD  Head: NC/AT  Cardiac: RRR, no m/r/g  Respiratory: CTABL, equal chest wall expansions, no conversational dyspnea  Abdomen: soft, ND, +ttp LLQ  Neuro: AAOx3,coordinated movement of all 4 extremities  Psych: calm, cooperative, normal affect  Skin: warm and dry

## 2021-07-03 NOTE — ED PROVIDER NOTE - CARE PROVIDER_API CALL
Dk Alonzo (MD)  Gastroenterology; Internal Medicine  [address], [address]  [place] [address]  Phone: [phone]  Fax: [phone]  Follow Up Time: Urgent

## 2021-07-03 NOTE — ED PROVIDER NOTE - PROGRESS NOTE DETAILS
Pt vomiting improved, reports pain decreased from 10/10 to 8/10 with the 8 morphine. Still agitated, moaning on the bed. Ordered 0.5 dilaudid, 15 toradol. Pt still vomiting, in severe pain after 4 zofran and 4 morphine. Ordered rpt morphine and 10 reglan. Latha: CT scan negative for acute pathology. Plan for d/c with GI followup.

## 2021-07-03 NOTE — ED PROVIDER NOTE - CLINICAL SUMMARY MEDICAL DECISION MAKING FREE TEXT BOX
35yo M w/pmh cyclic vomiting syndrome,  Anxiety, Colitis, ETOH abuse, Gastritis, Pancreatitis, and Stomach ulcer presents for abdominal pain, nausea, vomiting. Morphine, zofran for symptoms. Labs, CT pending.

## 2021-07-04 ENCOUNTER — EMERGENCY (EMERGENCY)
Facility: HOSPITAL | Age: 36
LOS: 1 days | Discharge: HOME | End: 2021-07-04
Attending: EMERGENCY MEDICINE
Payer: MEDICAID

## 2021-07-04 VITALS
RESPIRATION RATE: 16 BRPM | OXYGEN SATURATION: 99 % | HEART RATE: 94 BPM | TEMPERATURE: 98 F | DIASTOLIC BLOOD PRESSURE: 96 MMHG | SYSTOLIC BLOOD PRESSURE: 157 MMHG

## 2021-07-04 VITALS
OXYGEN SATURATION: 98 % | DIASTOLIC BLOOD PRESSURE: 91 MMHG | RESPIRATION RATE: 14 BRPM | TEMPERATURE: 99 F | SYSTOLIC BLOOD PRESSURE: 152 MMHG | HEIGHT: 71 IN | HEART RATE: 77 BPM

## 2021-07-04 PROCEDURE — 99283 EMERGENCY DEPT VISIT LOW MDM: CPT

## 2021-07-04 PROCEDURE — 93010 ELECTROCARDIOGRAM REPORT: CPT

## 2021-07-04 PROCEDURE — 99284 EMERGENCY DEPT VISIT MOD MDM: CPT

## 2021-07-04 PROCEDURE — 93005 ELECTROCARDIOGRAM TRACING: CPT

## 2021-07-04 RX ORDER — ONDANSETRON 8 MG/1
4 TABLET, FILM COATED ORAL ONCE
Refills: 0 | Status: COMPLETED | OUTPATIENT
Start: 2021-07-04 | End: 2021-07-04

## 2021-07-04 RX ORDER — HYDROXYZINE HCL 10 MG
50 TABLET ORAL ONCE
Refills: 0 | Status: COMPLETED | OUTPATIENT
Start: 2021-07-04 | End: 2021-07-04

## 2021-07-04 RX ADMIN — ONDANSETRON 4 MILLIGRAM(S): 8 TABLET, FILM COATED ORAL at 04:57

## 2021-07-04 RX ADMIN — Medication 0.1 MILLIGRAM(S): at 04:57

## 2021-07-04 RX ADMIN — Medication 50 MILLIGRAM(S): at 04:57

## 2021-07-04 NOTE — ED PROVIDER NOTE - NSFOLLOWUPINSTRUCTIONS_ED_ALL_ED_FT
Nausea / Vomiting    Nausea is the feeling that you have to vomit. As nausea gets worse, it can lead to vomiting. Vomiting puts you at an increased risk for dehydration. Older adults and people with other diseases or a weak immune system are at higher risk for dehydration. Drink clear fluids in small but frequent amounts as tolerated. Eat bland, easy-to-digest foods in small amounts as tolerated.    SEEK IMMEDIATE MEDICAL CARE IF YOU HAVE ANY OF THE FOLLOWING SYMPTOMS: fever, inability to keep sufficient fluids down, black or bloody vomitus, black or bloody stools, lightheadedness/dizziness, chest pain, severe headache, rash, shortness of breath, cold or clammy skin, confusion, pain with urination, or any signs of dehydration.     Anxiety    Generalized anxiety disorder (MALLORY) is a mental disorder. It is defined as anxiety that is not necessarily related to specific events or activities or is out of proportion to said events. Symptoms include restlessness, fatigue, difficulty concentrations, irritability and difficulty concentrating. It may interfere with life functions, including relationships, work, and school. If you were started on a medication, make sure to take exactly as prescribed and follow up with a psychiatrist.    SEEK IMMEDIATE MEDICAL CARE IF YOU HAVE ANY OF THE FOLLOWING SYMPTOMS: thoughts about hurting killing yourself, thoughts about hurting or killing somebody else, hallucinations, or worsening depression.

## 2021-07-04 NOTE — ED PROVIDER NOTE - PATIENT PORTAL LINK FT
You can access the FollowMyHealth Patient Portal offered by [facility] by registering at the following website: [email or web address]. By joining [employer]’s FollowMyHealth portal, you will also be able to view your health information using other applications (apps) compatible with our system.

## 2021-07-04 NOTE — ED PROVIDER NOTE - OBJECTIVE STATEMENT
35 y/o male seen in ED earlier today with neg ct/labs/w/u c/o panic attack with continued nausea. Pt with hx of substance abuse (reports has been clean for 30 days), and cyclic vomiting. Symtposm improved but intermittent still present. no headache, f/c/cp/sob. No o ther symptoms.     ROS: No fever/chills. No eye pain/changes in vision, No ear pain/sore throat/dysphagia, No chest pain/palpitations. No SOB/cough/.  no black/bloody bm. No dysuria/frequency/discharge, No headache. No Dizziness.    No rashes or breaks in skin. No numbness/tingling/weakness.

## 2021-07-04 NOTE — ED ADULT NURSE NOTE - OBJECTIVE STATEMENT
Patient A&Ox4 complaining of feeling anxious and vomiting with intermittent palpitations.  Pt describes emesis as non bloody, mostly bile.  Pt reports being 330 days sober from alcohol, and marijuana. Denies any withdrawal sx.   NAD noted, respirations even and unlabored.  Safety precautions in place.  Plan of care explained, pt verbalized understanding.

## 2021-07-04 NOTE — ED ADULT TRIAGE NOTE - CHIEF COMPLAINT QUOTE
Pt c/o "heart racing" r/t withdrawal. Patient was seen and discharged from [facility] earlier this evening for the same issue. Patient states he has been sober for 30 days and feels unwell. Denies chest pain, SOB.

## 2021-07-04 NOTE — ED ADULT NURSE NOTE - NS_NURSE_DISC_ED_ALL_ED_PROVIDEDBY
Chief Complaint   Patient presents with     RECHECK     Mental health.       Initial /80 (BP Location: Right arm, Patient Position: Sitting, Cuff Size: Adult Large)  Pulse 104  Temp 99.1  F (37.3  C) (Tympanic)  Wt 260 lb (117.9 kg)  BMI 35.26 kg/m2 Estimated body mass index is 35.26 kg/(m^2) as calculated from the following:    Height as of 8/20/17: 6' (1.829 m).    Weight as of this encounter: 260 lb (117.9 kg).  Medication Reconciliation: complete    MARIKA OQUENDO LPN     ED MD

## 2021-07-04 NOTE — ED ADULT NURSE NOTE - CHIEF COMPLAINT QUOTE
Pt c/o "heart racing" r/t withdrawal. Patient was seen and discharged from [facility] earlier this evening for the same issue. Patient states he has been sober for 30 days and feels unwell. Denies chest pain, SOB.

## 2021-07-04 NOTE — ED PROVIDER NOTE - CLINICAL SUMMARY MEDICAL DECISION MAKING FREE TEXT BOX
cyclic vomiting with nausea/anxiety. benign abd with neg labs/ct earlier today. no withdrawal given no recent substances in >4 weeks. po meds, pt improved, return precautions and f/u.

## 2021-07-05 ENCOUNTER — EMERGENCY (EMERGENCY)
Facility: HOSPITAL | Age: 36
LOS: 1 days | Discharge: HOME | End: 2021-07-05
Attending: EMERGENCY MEDICINE
Payer: MEDICAID

## 2021-07-05 VITALS
WEIGHT: 149.91 LBS | RESPIRATION RATE: 22 BRPM | OXYGEN SATURATION: 99 % | HEART RATE: 93 BPM | SYSTOLIC BLOOD PRESSURE: 145 MMHG | TEMPERATURE: 99 F | HEIGHT: 71 IN | DIASTOLIC BLOOD PRESSURE: 73 MMHG

## 2021-07-05 VITALS — OXYGEN SATURATION: 96 % | RESPIRATION RATE: 18 BRPM

## 2021-07-05 LAB
ALBUMIN SERPL ELPH-MCNC: 4.7 G/DL — SIGNIFICANT CHANGE UP (ref 3.3–5.2)
ALP SERPL-CCNC: 64 U/L — SIGNIFICANT CHANGE UP (ref 40–120)
ALT FLD-CCNC: 51 U/L — HIGH
ANION GAP SERPL CALC-SCNC: 17 MMOL/L — SIGNIFICANT CHANGE UP (ref 5–17)
AST SERPL-CCNC: 79 U/L — HIGH
BASOPHILS # BLD AUTO: 0.03 K/UL — SIGNIFICANT CHANGE UP (ref 0–0.2)
BASOPHILS NFR BLD AUTO: 0.3 % — SIGNIFICANT CHANGE UP (ref 0–2)
BILIRUB SERPL-MCNC: 0.4 MG/DL — SIGNIFICANT CHANGE UP (ref 0.4–2)
BUN SERPL-MCNC: 14.4 MG/DL — SIGNIFICANT CHANGE UP (ref 8–20)
CALCIUM SERPL-MCNC: 10 MG/DL — SIGNIFICANT CHANGE UP (ref 8.6–10.2)
CHLORIDE SERPL-SCNC: 99 MMOL/L — SIGNIFICANT CHANGE UP (ref 98–107)
CO2 SERPL-SCNC: 20 MMOL/L — LOW (ref 22–29)
CREAT SERPL-MCNC: 0.8 MG/DL — SIGNIFICANT CHANGE UP (ref 0.5–1.3)
EOSINOPHIL # BLD AUTO: 0 K/UL — SIGNIFICANT CHANGE UP (ref 0–0.5)
EOSINOPHIL NFR BLD AUTO: 0 % — SIGNIFICANT CHANGE UP (ref 0–6)
GLUCOSE SERPL-MCNC: 120 MG/DL — HIGH (ref 70–99)
HCT VFR BLD CALC: 42.5 % — SIGNIFICANT CHANGE UP (ref 39–50)
HGB BLD-MCNC: 15.5 G/DL — SIGNIFICANT CHANGE UP (ref 13–17)
IMM GRANULOCYTES NFR BLD AUTO: 0.3 % — SIGNIFICANT CHANGE UP (ref 0–1.5)
LIDOCAIN IGE QN: 25 U/L — SIGNIFICANT CHANGE UP (ref 22–51)
LYMPHOCYTES # BLD AUTO: 0.96 K/UL — LOW (ref 1–3.3)
LYMPHOCYTES # BLD AUTO: 9.5 % — LOW (ref 13–44)
MCHC RBC-ENTMCNC: 34.4 PG — HIGH (ref 27–34)
MCHC RBC-ENTMCNC: 36.5 GM/DL — HIGH (ref 32–36)
MCV RBC AUTO: 94.4 FL — SIGNIFICANT CHANGE UP (ref 80–100)
MONOCYTES # BLD AUTO: 0.43 K/UL — SIGNIFICANT CHANGE UP (ref 0–0.9)
MONOCYTES NFR BLD AUTO: 4.3 % — SIGNIFICANT CHANGE UP (ref 2–14)
NEUTROPHILS # BLD AUTO: 8.63 K/UL — HIGH (ref 1.8–7.4)
NEUTROPHILS NFR BLD AUTO: 85.6 % — HIGH (ref 43–77)
PLATELET # BLD AUTO: 268 K/UL — SIGNIFICANT CHANGE UP (ref 150–400)
POTASSIUM SERPL-MCNC: 3.6 MMOL/L — SIGNIFICANT CHANGE UP (ref 3.5–5.3)
POTASSIUM SERPL-SCNC: 3.6 MMOL/L — SIGNIFICANT CHANGE UP (ref 3.5–5.3)
PROT SERPL-MCNC: 7.2 G/DL — SIGNIFICANT CHANGE UP (ref 6.6–8.7)
RBC # BLD: 4.5 M/UL — SIGNIFICANT CHANGE UP (ref 4.2–5.8)
RBC # FLD: 11.5 % — SIGNIFICANT CHANGE UP (ref 10.3–14.5)
SODIUM SERPL-SCNC: 136 MMOL/L — SIGNIFICANT CHANGE UP (ref 135–145)
WBC # BLD: 10.08 K/UL — SIGNIFICANT CHANGE UP (ref 3.8–10.5)
WBC # FLD AUTO: 10.08 K/UL — SIGNIFICANT CHANGE UP (ref 3.8–10.5)

## 2021-07-05 PROCEDURE — 85025 COMPLETE CBC W/AUTO DIFF WBC: CPT

## 2021-07-05 PROCEDURE — 71045 X-RAY EXAM CHEST 1 VIEW: CPT | Mod: 26

## 2021-07-05 PROCEDURE — 96375 TX/PRO/DX INJ NEW DRUG ADDON: CPT

## 2021-07-05 PROCEDURE — 96372 THER/PROPH/DIAG INJ SC/IM: CPT | Mod: XU

## 2021-07-05 PROCEDURE — 71045 X-RAY EXAM CHEST 1 VIEW: CPT

## 2021-07-05 PROCEDURE — 36415 COLL VENOUS BLD VENIPUNCTURE: CPT

## 2021-07-05 PROCEDURE — 80053 COMPREHEN METABOLIC PANEL: CPT

## 2021-07-05 PROCEDURE — 83690 ASSAY OF LIPASE: CPT

## 2021-07-05 PROCEDURE — 96374 THER/PROPH/DIAG INJ IV PUSH: CPT

## 2021-07-05 PROCEDURE — 99284 EMERGENCY DEPT VISIT MOD MDM: CPT

## 2021-07-05 PROCEDURE — 99284 EMERGENCY DEPT VISIT MOD MDM: CPT | Mod: 25

## 2021-07-05 RX ORDER — ONDANSETRON 8 MG/1
1 TABLET, FILM COATED ORAL
Qty: 15 | Refills: 0
Start: 2021-07-05 | End: 2021-07-09

## 2021-07-05 RX ORDER — ONDANSETRON 8 MG/1
4 TABLET, FILM COATED ORAL ONCE
Refills: 0 | Status: COMPLETED | OUTPATIENT
Start: 2021-07-05 | End: 2021-07-05

## 2021-07-05 RX ORDER — SODIUM CHLORIDE 9 MG/ML
2000 INJECTION, SOLUTION INTRAVENOUS
Refills: 0 | Status: DISCONTINUED | OUTPATIENT
Start: 2021-07-05 | End: 2021-07-05

## 2021-07-05 RX ORDER — SODIUM CHLORIDE 9 MG/ML
2000 INJECTION, SOLUTION INTRAVENOUS
Refills: 0 | Status: COMPLETED | OUTPATIENT
Start: 2021-07-05 | End: 2021-07-05

## 2021-07-05 RX ORDER — HALOPERIDOL DECANOATE 100 MG/ML
5 INJECTION INTRAMUSCULAR ONCE
Refills: 0 | Status: COMPLETED | OUTPATIENT
Start: 2021-07-05 | End: 2021-07-05

## 2021-07-05 RX ADMIN — SODIUM CHLORIDE 1000 MILLILITER(S): 9 INJECTION, SOLUTION INTRAVENOUS at 17:03

## 2021-07-05 RX ADMIN — ONDANSETRON 4 MILLIGRAM(S): 8 TABLET, FILM COATED ORAL at 14:36

## 2021-07-05 RX ADMIN — Medication 1 MILLIGRAM(S): at 14:36

## 2021-07-05 RX ADMIN — HALOPERIDOL DECANOATE 5 MILLIGRAM(S): 100 INJECTION INTRAMUSCULAR at 14:41

## 2021-07-05 NOTE — ED ADULT NURSE NOTE - CHIEF COMPLAINT
amylase-lipase-protease (CREON 24) 23894-82689 units CPEP per EC capsule  Last Written Prescription Date:  10/19/18  Last Fill Quantity: 450   # refills: 11  Last Office Visit : 7/2/19  Future Office visit:  None        
The patient is a 36y Male complaining of abdominal pain.

## 2021-07-05 NOTE — ED PROVIDER NOTE - CLINICAL SUMMARY MEDICAL DECISION MAKING FREE TEXT BOX
Pt with sig discomfort, N/V not responding to conservative Tx given on other visits will tx with meds, eval with labs and revaluate.

## 2021-07-05 NOTE — ED ADULT TRIAGE NOTE - CHIEF COMPLAINT QUOTE
Patient A&Ox4 complaining of abdominal pain, nausea & vomiting that began last night. Patient seen in ED x2 days ago for same complaint. Hx of pancreatitis, denies any alcohol use. Stated was prescribed Zofran, has not been able to fill Rx.

## 2021-07-05 NOTE — ED PROVIDER NOTE - NS ED ROS FT
ROS: CONTUSIONAL: Denies fever, chills, fatigue, wt loss. HEAD: Denies trauma, HA, Dizziness. EYE: Denies Acute visual changes, diplopia. ENMT: Denies change in hearing, tinnitus, epistaxis, difficulty swallowing, sore throat. CARDIO: Denies CP, palpitations, edema. RESP: Denies Cough, SOB , Diff breathing, hemoptysis. GI: + N/V, ABD pain Denies, change in bowel movement. URINARY: Denies difficulty urinating, pelvic pain. MS:  Denies joint pain, back pain, weakness, decreased ROM, swelling. NEURO: Denies change in gait, seizures, loss of sensation, dizziness, confusion LOC.  PSY: NO SI/HI. SKIN: Denies Rash, bruising.

## 2021-07-05 NOTE — ED PROVIDER NOTE - ATTENDING CONTRIBUTION TO CARE
I, Girma Lopez, performed a face to face bedside interview with this patient regarding history of present illness, review of symptoms and relevant past medical, social and family history.  I completed an independent physical examination. I have communicated the patient’s plan of care and disposition with the ACP.   Pt with Hx of cyclical vomiting and polysubstance abuse presents with nausea and vomiting. Pt was seen here yesterday and normal CT scan. Improved, discharged, but Sx returned.  Gen: NAD, well appearing  CV: RRR  Pul: CTA b/l  Abd: Soft, non-distended, non-tender  Neuro: no focal deficits  Pt improved, tolerated PO, stable for dc

## 2021-07-05 NOTE — ED PROVIDER NOTE - PATIENT PORTAL LINK FT
You can access the FollowMyHealth Patient Portal offered by [facility] by registering at the following website: [email or web address]. By joining [employer]’s FollowMyHealth portal, you will also be able to view your health information using other applications (apps) compatible with our system.

## 2021-07-05 NOTE — ED ADULT NURSE REASSESSMENT NOTE - NS ED NURSE REASSESS COMMENT FT1
pt. sleeping. pt. states he is feeling better. pt. in no apparent distress at this time, breathing even and unlabored.

## 2021-07-05 NOTE — ED PROVIDER NOTE - PSH
Bedside and Verbal shift change report given to Daily Ventura RN by Norma Manuel RN.  Report included the following information SBAR, Kardex, OR Summary, Intake/Output and MAR 
 

Bedside and Verbal shift change report given to [facility], RN by Prudence Escamilla. Report included the following information SBAR, Kardex, OR Summary, Intake/Output and MAR.
No significant past surgical history

## 2021-07-05 NOTE — ED PROVIDER NOTE - OBJECTIVE STATEMENT
PT with SPMHX of polysubstance abuse, cyclical vomiting syndrome, pancreatitis presents to the ED with complaint of abd pain N/V x 2 days. Pt states that he had a gradual onset of N/V that is similar to other incidents in the past. Pt states that he has cyclical vomiting syndrome and that he believes this is the casus of his symptoms. Pt states that he has been seen in [facility] ED for this with little to no improvement twice. Pt states that he has a moderate amount of constat dull abdominal pain that has not been made better or worse by anything. Pt states that he has not used any illicit substances or alcohol in the last 21 dyas and then had a few cigarets just before symptoms started. Pt dines fever, chills, weakness, numbness, tingling, HA, dizziness, SOB, diff breathing, CP.

## 2021-07-05 NOTE — ED ADULT NURSE NOTE - OBJECTIVE STATEMENT
assumed care at this time. 35 y/o m a&ox3 comes to ed c/o abdominal pain, nausea, vomiting. pt. states it began last night. pt. states he has a history of this and was prescribed Zofran. pt. states they gave him "Zofran and Dilaudid and it helped, and Morphine."

## 2021-07-09 ENCOUNTER — EMERGENCY (EMERGENCY)
Facility: HOSPITAL | Age: 36
LOS: 1 days | Discharge: HOME | End: 2021-07-09
Attending: EMERGENCY MEDICINE
Payer: MEDICAID

## 2021-07-09 VITALS
HEIGHT: 71 IN | SYSTOLIC BLOOD PRESSURE: 175 MMHG | RESPIRATION RATE: 16 BRPM | OXYGEN SATURATION: 100 % | DIASTOLIC BLOOD PRESSURE: 94 MMHG | HEART RATE: 86 BPM | TEMPERATURE: 98 F

## 2021-07-09 VITALS
TEMPERATURE: 98 F | DIASTOLIC BLOOD PRESSURE: 92 MMHG | OXYGEN SATURATION: 99 % | HEART RATE: 67 BPM | RESPIRATION RATE: 18 BRPM | SYSTOLIC BLOOD PRESSURE: 161 MMHG

## 2021-07-09 LAB
ALBUMIN SERPL ELPH-MCNC: 4.6 G/DL — SIGNIFICANT CHANGE UP (ref 3.3–5.2)
ALP SERPL-CCNC: 66 U/L — SIGNIFICANT CHANGE UP (ref 40–120)
ALT FLD-CCNC: 41 U/L — HIGH
ANION GAP SERPL CALC-SCNC: 14 MMOL/L — SIGNIFICANT CHANGE UP (ref 5–17)
AST SERPL-CCNC: 25 U/L — SIGNIFICANT CHANGE UP
BASOPHILS # BLD AUTO: 0.03 K/UL — SIGNIFICANT CHANGE UP (ref 0–0.2)
BASOPHILS NFR BLD AUTO: 0.3 % — SIGNIFICANT CHANGE UP (ref 0–2)
BILIRUB SERPL-MCNC: 0.3 MG/DL — LOW (ref 0.4–2)
BUN SERPL-MCNC: 14.8 MG/DL — SIGNIFICANT CHANGE UP (ref 8–20)
CALCIUM SERPL-MCNC: 10.1 MG/DL — SIGNIFICANT CHANGE UP (ref 8.6–10.2)
CHLORIDE SERPL-SCNC: 99 MMOL/L — SIGNIFICANT CHANGE UP (ref 98–107)
CO2 SERPL-SCNC: 23 MMOL/L — SIGNIFICANT CHANGE UP (ref 22–29)
CREAT SERPL-MCNC: 0.72 MG/DL — SIGNIFICANT CHANGE UP (ref 0.5–1.3)
EOSINOPHIL # BLD AUTO: 0.17 K/UL — SIGNIFICANT CHANGE UP (ref 0–0.5)
EOSINOPHIL NFR BLD AUTO: 1.9 % — SIGNIFICANT CHANGE UP (ref 0–6)
GLUCOSE SERPL-MCNC: 117 MG/DL — HIGH (ref 70–99)
HCT VFR BLD CALC: 44.4 % — SIGNIFICANT CHANGE UP (ref 39–50)
HGB BLD-MCNC: 15.9 G/DL — SIGNIFICANT CHANGE UP (ref 13–17)
IMM GRANULOCYTES NFR BLD AUTO: 0.4 % — SIGNIFICANT CHANGE UP (ref 0–1.5)
LIDOCAIN IGE QN: 22 U/L — SIGNIFICANT CHANGE UP (ref 22–51)
LYMPHOCYTES # BLD AUTO: 1.31 K/UL — SIGNIFICANT CHANGE UP (ref 1–3.3)
LYMPHOCYTES # BLD AUTO: 14.6 % — SIGNIFICANT CHANGE UP (ref 13–44)
MCHC RBC-ENTMCNC: 34.6 PG — HIGH (ref 27–34)
MCHC RBC-ENTMCNC: 35.8 GM/DL — SIGNIFICANT CHANGE UP (ref 32–36)
MCV RBC AUTO: 96.5 FL — SIGNIFICANT CHANGE UP (ref 80–100)
MONOCYTES # BLD AUTO: 0.64 K/UL — SIGNIFICANT CHANGE UP (ref 0–0.9)
MONOCYTES NFR BLD AUTO: 7.1 % — SIGNIFICANT CHANGE UP (ref 2–14)
NEUTROPHILS # BLD AUTO: 6.77 K/UL — SIGNIFICANT CHANGE UP (ref 1.8–7.4)
NEUTROPHILS NFR BLD AUTO: 75.7 % — SIGNIFICANT CHANGE UP (ref 43–77)
PLATELET # BLD AUTO: 245 K/UL — SIGNIFICANT CHANGE UP (ref 150–400)
POTASSIUM SERPL-MCNC: 4 MMOL/L — SIGNIFICANT CHANGE UP (ref 3.5–5.3)
POTASSIUM SERPL-SCNC: 4 MMOL/L — SIGNIFICANT CHANGE UP (ref 3.5–5.3)
PROT SERPL-MCNC: 7.1 G/DL — SIGNIFICANT CHANGE UP (ref 6.6–8.7)
RBC # BLD: 4.6 M/UL — SIGNIFICANT CHANGE UP (ref 4.2–5.8)
RBC # FLD: 11.8 % — SIGNIFICANT CHANGE UP (ref 10.3–14.5)
SODIUM SERPL-SCNC: 136 MMOL/L — SIGNIFICANT CHANGE UP (ref 135–145)
WBC # BLD: 8.96 K/UL — SIGNIFICANT CHANGE UP (ref 3.8–10.5)
WBC # FLD AUTO: 8.96 K/UL — SIGNIFICANT CHANGE UP (ref 3.8–10.5)

## 2021-07-09 PROCEDURE — 93005 ELECTROCARDIOGRAM TRACING: CPT

## 2021-07-09 PROCEDURE — 80053 COMPREHEN METABOLIC PANEL: CPT

## 2021-07-09 PROCEDURE — 83690 ASSAY OF LIPASE: CPT

## 2021-07-09 PROCEDURE — 85025 COMPLETE CBC W/AUTO DIFF WBC: CPT

## 2021-07-09 PROCEDURE — 96375 TX/PRO/DX INJ NEW DRUG ADDON: CPT

## 2021-07-09 PROCEDURE — 99284 EMERGENCY DEPT VISIT MOD MDM: CPT | Mod: 25

## 2021-07-09 PROCEDURE — 96374 THER/PROPH/DIAG INJ IV PUSH: CPT

## 2021-07-09 PROCEDURE — 99285 EMERGENCY DEPT VISIT HI MDM: CPT

## 2021-07-09 PROCEDURE — 93010 ELECTROCARDIOGRAM REPORT: CPT

## 2021-07-09 PROCEDURE — 96376 TX/PRO/DX INJ SAME DRUG ADON: CPT

## 2021-07-09 PROCEDURE — 36415 COLL VENOUS BLD VENIPUNCTURE: CPT

## 2021-07-09 RX ORDER — MORPHINE SULFATE 50 MG/1
4 CAPSULE, EXTENDED RELEASE ORAL ONCE
Refills: 0 | Status: DISCONTINUED | OUTPATIENT
Start: 2021-07-09 | End: 2021-07-09

## 2021-07-09 RX ORDER — HALOPERIDOL DECANOATE 100 MG/ML
5 INJECTION INTRAMUSCULAR ONCE
Refills: 0 | Status: COMPLETED | OUTPATIENT
Start: 2021-07-09 | End: 2021-07-09

## 2021-07-09 RX ORDER — SODIUM CHLORIDE 9 MG/ML
1000 INJECTION INTRAMUSCULAR; INTRAVENOUS; SUBCUTANEOUS ONCE
Refills: 0 | Status: COMPLETED | OUTPATIENT
Start: 2021-07-09 | End: 2021-07-09

## 2021-07-09 RX ORDER — METOCLOPRAMIDE HCL 10 MG
10 TABLET ORAL ONCE
Refills: 0 | Status: COMPLETED | OUTPATIENT
Start: 2021-07-09 | End: 2021-07-09

## 2021-07-09 RX ORDER — DIPHENHYDRAMINE HCL 50 MG
50 CAPSULE ORAL ONCE
Refills: 0 | Status: COMPLETED | OUTPATIENT
Start: 2021-07-09 | End: 2021-07-09

## 2021-07-09 RX ORDER — ONDANSETRON 8 MG/1
4 TABLET, FILM COATED ORAL ONCE
Refills: 0 | Status: COMPLETED | OUTPATIENT
Start: 2021-07-09 | End: 2021-07-09

## 2021-07-09 RX ORDER — FAMOTIDINE 10 MG/ML
20 INJECTION INTRAVENOUS ONCE
Refills: 0 | Status: COMPLETED | OUTPATIENT
Start: 2021-07-09 | End: 2021-07-09

## 2021-07-09 RX ORDER — ONDANSETRON 8 MG/1
1 TABLET, FILM COATED ORAL
Qty: 10 | Refills: 0
Start: 2021-07-09

## 2021-07-09 RX ADMIN — HALOPERIDOL DECANOATE 5 MILLIGRAM(S): 100 INJECTION INTRAMUSCULAR at 11:32

## 2021-07-09 RX ADMIN — ONDANSETRON 4 MILLIGRAM(S): 8 TABLET, FILM COATED ORAL at 14:39

## 2021-07-09 RX ADMIN — Medication 50 MILLIGRAM(S): at 13:03

## 2021-07-09 RX ADMIN — MORPHINE SULFATE 4 MILLIGRAM(S): 50 CAPSULE, EXTENDED RELEASE ORAL at 11:32

## 2021-07-09 RX ADMIN — FAMOTIDINE 20 MILLIGRAM(S): 10 INJECTION INTRAVENOUS at 13:03

## 2021-07-09 RX ADMIN — SODIUM CHLORIDE 1000 MILLILITER(S): 9 INJECTION INTRAMUSCULAR; INTRAVENOUS; SUBCUTANEOUS at 11:32

## 2021-07-09 RX ADMIN — MORPHINE SULFATE 4 MILLIGRAM(S): 50 CAPSULE, EXTENDED RELEASE ORAL at 13:03

## 2021-07-09 RX ADMIN — Medication 10 MILLIGRAM(S): at 11:32

## 2021-07-09 NOTE — ED PROVIDER NOTE - ATTENDING CONTRIBUTION TO CARE
Dr. Rodriguez : I have personally seen and examined this patient at the bedside. I have fully participated in the care of this patient. I have reviewed all pertinent clinical information, including history, physical exam, plan and the Resident's note and agree except as noted.     37 y/o M pt with hx of gastritis, etoh abuse pw epigastric abdominal pain x 1 day. notes the same symptoms over the last week. multiple episodes of vomiting >10 non bloody non bilious.   Denies f/c/cp/sob/palpitations/cough/d/c/dysuria/hematuria. sick contacts/recent travel.    PE:  head; atraumatic normocephalic  eyes: perrla  Heart: rrr s1s2  lungs: ctab  abd: soft, epigastric ttp nd + bs no rebound/guarding no cva ttp  le: no swelling no calf ttp  back: no midline cervical/thoracic/lumbar ttp    -->ct neg 7/3; was seen 2x after that tx with fluids haldol --will check labs pain meds--reasses

## 2021-07-09 NOTE — ED PROVIDER NOTE - PROGRESS NOTE DETAILS
Pt sitting up in bed, no longer vomiting. C/o continued pain, will give additional medications and reeval. - Gregory Whitehead, PGY-3 Pt reports pain is improved, requesting additional zofran. Will medicate then PO challenge. - Gregory Whitehead, PGY-3 Pt tolerating PO. Reviewed negative w/u results with pt. Pt reports feeling improved. Comfortable with plan for d/c. Pt agrees to f/u with GI. Return instructions given, questions answered. - Gregory Whitehead, PGY-3

## 2021-07-09 NOTE — ED PROVIDER NOTE - PHYSICAL EXAMINATION
Gen: no acute distress  Head: normocephalic, atraumatic  Lung: CTAB, no respiratory distress, no wheezing, rales, rhonchi  CV: normal s1/s2, rrr,   Abd: soft, mild epigastric tenderness, non-distended  MSK: No edema, no visible deformities, full range of motion in all 4 extremities  Neuro: No focal neurologic deficits  Skin: No rash   Psych: normal affect

## 2021-07-09 NOTE — ED ADULT NURSE NOTE - OBJECTIVE STATEMENT
Assumed care of the Pt AOx4 in no acute distress. Pt complaining of LUQ ABD pain radiating to back with N/V since 9am. Pt states he has had similar episodes in the past. Pt states he has been drug and alcohol free for the last month. Pt Denies any chest pain, shortness of breath, dizziness, fever or chills. labs sent as per orders, meds given as per order. pt educated on plan of care, pt able to successfully teach back plan of care to RN, RN will continue to reeducate pt during hospital stay.

## 2021-07-09 NOTE — ED PROVIDER NOTE - OBJECTIVE STATEMENT
37 y/o M pt with hx of gastritis, etoh abuse presenting today with c/o epigastric abdominal pain and n/v onset 2 hours pta. Pt was seen and evaluated in the ED several days ago, had normal labs was treated for cyclic vomiting and d/c home. Pt with negative CT a/p 7/3. Pt states that pain today is similar to past. Was able to tolerate PO yesterday. Pt denies any chest pain, dyspnea, fevers, dysuria, headache, cough, congestion, sore throat, neck pain, back pain, weakness, numbness, tingling, dizziness, syncope, or other complaint.

## 2021-07-09 NOTE — ED PROVIDER NOTE - PATIENT PORTAL LINK FT
You can access the FollowMyHealth Patient Portal offered by [facility] by registering at the following website: [email or web address]. By joining [employer]’s FollowMyHealth portal, you will also be able to view your health information using other applications (apps) compatible with our system.

## 2021-07-09 NOTE — ED ADULT TRIAGE NOTE - CHIEF COMPLAINT QUOTE
Pt arrives in notable distress for c/o LUQ abd pain, N/V that he states has been on and off for the last week. Pt reports being drug/alcohol free for the last month.

## 2021-07-09 NOTE — ED PROVIDER NOTE - CLINICAL SUMMARY MEDICAL DECISION MAKING FREE TEXT BOX
Pt with multiple ED visits over the past 2 weeks for cyclic vomiting presenting today with abdominal pain and n/v c/w hx of cyclic vomiting. Negative CT a/p on 7/3. Will check labs, lipase, ekg, give fluids, morphine, haldol, reassess.

## 2021-07-09 NOTE — ED ADULT NURSE NOTE - NSIMPLEMENTINTERV_GEN_ALL_ED
Implemented All Universal Safety Interventions:  [place] to call system. Call bell, personal items and telephone within reach. Instruct patient to call for assistance. Room bathroom lighting operational. Non-slip footwear when patient is off stretcher. Physically safe environment: no spills, clutter or unnecessary equipment. Stretcher in lowest position, wheels locked, appropriate side rails in place.

## 2021-07-09 NOTE — ED PROVIDER NOTE - CARE PROVIDER_API CALL
Eitan Barnes)  Gastroenterology; Internal Medicine  [address]  [place] [address]  Phone: [phone]  Fax: [phone]  Follow Up Time:

## 2021-07-14 ENCOUNTER — APPOINTMENT (OUTPATIENT)
Dept: FAMILY MEDICINE | Facility: CLINIC | Age: 36
End: 2021-07-14

## 2021-07-16 ENCOUNTER — APPOINTMENT (OUTPATIENT)
Dept: FAMILY MEDICINE | Facility: CLINIC | Age: 36
End: 2021-07-16
Payer: MEDICAID

## 2021-07-16 ENCOUNTER — APPOINTMENT (OUTPATIENT)
Dept: FAMILY MEDICINE | Facility: CLINIC | Age: 36
End: 2021-07-16

## 2021-07-16 ENCOUNTER — NON-APPOINTMENT (OUTPATIENT)
Age: 36
End: 2021-07-16

## 2021-07-16 VITALS
RESPIRATION RATE: 12 BRPM | TEMPERATURE: 97.5 F | OXYGEN SATURATION: 97 % | BODY MASS INDEX: 23.04 KG/M2 | SYSTOLIC BLOOD PRESSURE: 122 MMHG | WEIGHT: 152 LBS | HEART RATE: 78 BPM | DIASTOLIC BLOOD PRESSURE: 76 MMHG | HEIGHT: 68 IN

## 2021-07-16 DIAGNOSIS — K52.9 NONINFECTIVE GASTROENTERITIS AND COLITIS, UNSPECIFIED: ICD-10-CM

## 2021-07-16 DIAGNOSIS — Z56.0 UNEMPLOYMENT, UNSPECIFIED: ICD-10-CM

## 2021-07-16 DIAGNOSIS — R10.9 UNSPECIFIED ABDOMINAL PAIN: ICD-10-CM

## 2021-07-16 DIAGNOSIS — Z82.69 FAMILY HISTORY OF OTHER DISEASES OF THE MUSCULOSKELETAL SYSTEM AND CONNECTIVE TISSUE: ICD-10-CM

## 2021-07-16 DIAGNOSIS — Z81.1 FAMILY HISTORY OF ALCOHOL ABUSE AND DEPENDENCE: ICD-10-CM

## 2021-07-16 DIAGNOSIS — Z72.0 TOBACCO USE: ICD-10-CM

## 2021-07-16 DIAGNOSIS — K29.70 GASTRITIS, UNSPECIFIED, W/OUT BLEEDING: ICD-10-CM

## 2021-07-16 DIAGNOSIS — Z80.0 FAMILY HISTORY OF MALIGNANT NEOPLASM OF DIGESTIVE ORGANS: ICD-10-CM

## 2021-07-16 DIAGNOSIS — Z82.61 FAMILY HISTORY OF ARTHRITIS: ICD-10-CM

## 2021-07-16 DIAGNOSIS — Z78.9 OTHER SPECIFIED HEALTH STATUS: ICD-10-CM

## 2021-07-16 PROCEDURE — G0443: CPT

## 2021-07-16 PROCEDURE — 99406 BEHAV CHNG SMOKING 3-10 MIN: CPT

## 2021-07-16 PROCEDURE — 99385 PREV VISIT NEW AGE 18-39: CPT

## 2021-07-16 SDOH — ECONOMIC STABILITY - INCOME SECURITY: UNEMPLOYMENT, UNSPECIFIED: Z56.0

## 2021-07-19 NOTE — REVIEW OF SYSTEMS
[Fever] : no fever [Chills] : no chills [Fatigue] : no fatigue [Night Sweats] : no night sweats [Recent Change In Weight] : ~T no recent weight change [Discharge] : no discharge [Pain] : no pain [Redness] : no redness [Dryness] : no dryness [Vision Problems] : no vision problems [Itching] : no itching [Earache] : no earache [Hearing Loss] : no hearing loss [Nosebleeds] : no nosebleeds [Postnasal Drip] : no postnasal drip [Nasal Discharge] : no nasal discharge [Sore Throat] : no sore throat [Hoarseness] : no hoarseness [Chest Pain] : no chest pain [Palpitations] : no palpitations [Claudication] : no  leg claudication [Lower Ext Edema] : no lower extremity edema [Orthopena] : no orthopnea [Paroxysmal Nocturnal Dyspnea] : no paroxysmal nocturnal dyspnea [Shortness Of Breath] : no shortness of breath [Wheezing] : no wheezing [Cough] : no cough [Dyspnea on Exertion] : not dyspnea on exertion [Constipation] : no constipation [Melena] : no melena [Dysuria] : no dysuria [Incontinence] : no incontinence [Hesitancy] : no hesitancy [Nocturia] : no nocturia [Hematuria] : no hematuria [Frequency] : no frequency [Impotence] : no impotency [Poor Libido] : libido not poor [Joint Pain] : no joint pain [Joint Stiffness] : no joint stiffness [Muscle Pain] : no muscle pain [Muscle Weakness] : no muscle weakness [Back Pain] : no back pain [Joint Swelling] : no joint swelling [Itching] : no itching [Mole Changes] : no mole changes [Nail Changes] : no nail changes [Hair Changes] : no hair changes [Skin Rash] : no skin rash [Headache] : no headache [Dizziness] : no dizziness [Fainting] : no fainting [Confusion] : no confusion [Unsteady Walk] : no ataxia [Memory Loss] : no memory loss [Suicidal] : not suicidal [Insomnia] : no insomnia [Anxiety] : no anxiety [Depression] : no depression [Easy Bleeding] : no easy bleeding [Easy Bruising] : no easy bruising [Swollen Glands] : no swollen glands

## 2021-07-19 NOTE — HEALTH RISK ASSESSMENT
[de-identified] : hospitalized at [facility] 4 times since 6/29/21 for abd pain and vomiting/diarrhea [de-identified] : Trying to stop, no smoking for few weeks [de-identified] : weight lifting [de-identified] : Trying to watch diet [Change in mental status noted] : No change in mental status noted [Language] : denies difficulty with language [Behavior] : denies difficulty with behavior [Learning/Retaining New Information] : denies difficulty learning/retaining new information [Handling Complex Tasks] : denies difficulty handling complex tasks [Reasoning] : denies difficulty with reasoning [Spatial Ability and Orientation] : denies difficulty with spatial ability and orientation [High Risk Behavior] : no high risk behavior [Reports changes in hearing] : Reports no changes in hearing [Reports changes in dental health] : Reports no changes in dental health [Guns at Home] : no guns at home [Travel to Developing Areas] : does not  travel to developing areas [TB Exposure] : is not being exposed to tuberculosis [Caregiver Concerns] : does not have caregiver concerns [HIVDate] : 2021 [HIVComments] : Negative per pt [HepatitisCDate] : 2021 [HepatitisCComments] : Negative  per pt [de-identified] : wears [AdvancecareDate] : 7/16/21

## 2021-07-19 NOTE — PHYSICAL EXAM
[Soft] : abdomen soft [Non-distended] : non-distended [No Masses] : no abdominal mass palpated [No HSM] : no HSM [Normal Bowel Sounds] : normal bowel sounds [de-identified] : sl. tender mid abd, and left side

## 2021-07-19 NOTE — PLAN
[FreeTextEntry1] : CPE:  Done\par \par Abd pain:  continue with medications.  GI referral for eval and treatment.\par \par GERD:  prilosec 20 mg daily, Avoid spicy, greasy, hot food, fried food, alcohol, tomatoes, tomato sauce, cheese, soda, chocolate.   Avoid eating late at night, elevate the head of the bed.  Continue medication as ordered.\par \par tobacco used:  Smoking cessation counseling given.  Discussed the risk of smoking and the benefits of smoking cessation.  Encouraged to go to smoking cessation classes, Information given for classes.\par \par Alcohol/drug abused:  Counseling given, Discussed the risk of alcohol/drug used, and the health benefits of alcohol/drug cessation.  Encouraged to go to alcohol/drug cessation programs,  Information given.\par \par HM:  Discussed Life style modification, healthy diet, low salts, fats, sweets, less juices/sodas, butter, cheese, fried food.  More water, fruits vegetables.  Eat smaller portions 3-4 times per day, keep food diary, weight self weekly, move around more.  Exercise, walking 30-60 minutes per day.  Encouraged to sleep 8-9 hours per night, get plenty of rest.  Discussed dental hygiene, floss, brush after each meals, dental check every 6 months.  Annual eye check.  Encouraged frequent hands washing, wear mask, keep social distance.  Continue to f/u with specialists.  Discussed the importance of taking medications as ordered. Reported had labs done at [facility] last week.\par \par Follow up in 12-14 weeks or prn.\par \par \par \par

## 2021-07-19 NOTE — HISTORY OF PRESENT ILLNESS
[FreeTextEntry1] : CPE [de-identified] : 36 y.o male with PMHx of pancreatis, colitis, gastritis, GERD.  Here today for CPE to be linked to FSL.  Reported having abd pain, starting in mid abd, and radiates at times to left side and left shoulder.  Also reported feeling nauxeous during the day, worse when moving around and also when the car is moving.  Reported was hospitalized at [facility] 4 times since 6/29/21, did not f/u yet with GI.   Reported having diarrhea everyday, talking imodium sometimes, which help for 2 days to regulate the BM.

## 2021-08-23 NOTE — ED PROVIDER NOTE - NEURO NEGATIVE STATEMENT, MLM
68 no loss of consciousness, no gait abnormality, no headache, no sensory deficits, and no weakness.

## 2021-09-08 ENCOUNTER — EMERGENCY (EMERGENCY)
Facility: HOSPITAL | Age: 36
LOS: 1 days | End: 2021-09-08
Attending: EMERGENCY MEDICINE
Payer: MEDICAID

## 2021-09-08 VITALS
DIASTOLIC BLOOD PRESSURE: 76 MMHG | OXYGEN SATURATION: 99 % | RESPIRATION RATE: 20 BRPM | HEART RATE: 60 BPM | TEMPERATURE: 98 F | SYSTOLIC BLOOD PRESSURE: 124 MMHG

## 2021-09-08 VITALS
WEIGHT: 160.06 LBS | HEART RATE: 67 BPM | OXYGEN SATURATION: 98 % | RESPIRATION RATE: 18 BRPM | TEMPERATURE: 97 F | HEIGHT: 71 IN | DIASTOLIC BLOOD PRESSURE: 82 MMHG | SYSTOLIC BLOOD PRESSURE: 137 MMHG

## 2021-09-08 LAB
ALBUMIN SERPL ELPH-MCNC: 4.9 G/DL — SIGNIFICANT CHANGE UP (ref 3.3–5.2)
ALP SERPL-CCNC: 92 U/L — SIGNIFICANT CHANGE UP (ref 40–120)
ALT FLD-CCNC: 27 U/L — SIGNIFICANT CHANGE UP
ANION GAP SERPL CALC-SCNC: 20 MMOL/L — HIGH (ref 5–17)
AST SERPL-CCNC: 28 U/L — SIGNIFICANT CHANGE UP
BASOPHILS # BLD AUTO: 0.08 K/UL — SIGNIFICANT CHANGE UP (ref 0–0.2)
BASOPHILS NFR BLD AUTO: 0.5 % — SIGNIFICANT CHANGE UP (ref 0–2)
BILIRUB SERPL-MCNC: 0.3 MG/DL — LOW (ref 0.4–2)
BUN SERPL-MCNC: 11.8 MG/DL — SIGNIFICANT CHANGE UP (ref 8–20)
CALCIUM SERPL-MCNC: 10.2 MG/DL — SIGNIFICANT CHANGE UP (ref 8.6–10.2)
CHLORIDE SERPL-SCNC: 102 MMOL/L — SIGNIFICANT CHANGE UP (ref 98–107)
CO2 SERPL-SCNC: 19 MMOL/L — LOW (ref 22–29)
CREAT SERPL-MCNC: 0.86 MG/DL — SIGNIFICANT CHANGE UP (ref 0.5–1.3)
EOSINOPHIL # BLD AUTO: 0.38 K/UL — SIGNIFICANT CHANGE UP (ref 0–0.5)
EOSINOPHIL NFR BLD AUTO: 2.5 % — SIGNIFICANT CHANGE UP (ref 0–6)
GLUCOSE SERPL-MCNC: 157 MG/DL — HIGH (ref 70–99)
HCT VFR BLD CALC: 45.5 % — SIGNIFICANT CHANGE UP (ref 39–50)
HGB BLD-MCNC: 16.8 G/DL — SIGNIFICANT CHANGE UP (ref 13–17)
IMM GRANULOCYTES NFR BLD AUTO: 0.6 % — SIGNIFICANT CHANGE UP (ref 0–1.5)
LIDOCAIN IGE QN: 27 U/L — SIGNIFICANT CHANGE UP (ref 22–51)
LYMPHOCYTES # BLD AUTO: 1.87 K/UL — SIGNIFICANT CHANGE UP (ref 1–3.3)
LYMPHOCYTES # BLD AUTO: 12.1 % — LOW (ref 13–44)
MCHC RBC-ENTMCNC: 34.9 PG — HIGH (ref 27–34)
MCHC RBC-ENTMCNC: 36.9 GM/DL — HIGH (ref 32–36)
MCV RBC AUTO: 94.4 FL — SIGNIFICANT CHANGE UP (ref 80–100)
MONOCYTES # BLD AUTO: 0.98 K/UL — HIGH (ref 0–0.9)
MONOCYTES NFR BLD AUTO: 6.3 % — SIGNIFICANT CHANGE UP (ref 2–14)
NEUTROPHILS # BLD AUTO: 12.06 K/UL — HIGH (ref 1.8–7.4)
NEUTROPHILS NFR BLD AUTO: 78 % — HIGH (ref 43–77)
PLATELET # BLD AUTO: 262 K/UL — SIGNIFICANT CHANGE UP (ref 150–400)
POTASSIUM SERPL-MCNC: 3.9 MMOL/L — SIGNIFICANT CHANGE UP (ref 3.5–5.3)
POTASSIUM SERPL-SCNC: 3.9 MMOL/L — SIGNIFICANT CHANGE UP (ref 3.5–5.3)
PROT SERPL-MCNC: 7.5 G/DL — SIGNIFICANT CHANGE UP (ref 6.6–8.7)
RBC # BLD: 4.82 M/UL — SIGNIFICANT CHANGE UP (ref 4.2–5.8)
RBC # FLD: 12.1 % — SIGNIFICANT CHANGE UP (ref 10.3–14.5)
SODIUM SERPL-SCNC: 141 MMOL/L — SIGNIFICANT CHANGE UP (ref 135–145)
WBC # BLD: 15.46 K/UL — HIGH (ref 3.8–10.5)
WBC # FLD AUTO: 15.46 K/UL — HIGH (ref 3.8–10.5)

## 2021-09-08 PROCEDURE — 83690 ASSAY OF LIPASE: CPT

## 2021-09-08 PROCEDURE — 96376 TX/PRO/DX INJ SAME DRUG ADON: CPT

## 2021-09-08 PROCEDURE — G1004: CPT

## 2021-09-08 PROCEDURE — 74177 CT ABD & PELVIS W/CONTRAST: CPT | Mod: ME

## 2021-09-08 PROCEDURE — 96374 THER/PROPH/DIAG INJ IV PUSH: CPT | Mod: XU

## 2021-09-08 PROCEDURE — 36415 COLL VENOUS BLD VENIPUNCTURE: CPT

## 2021-09-08 PROCEDURE — 96372 THER/PROPH/DIAG INJ SC/IM: CPT

## 2021-09-08 PROCEDURE — 93005 ELECTROCARDIOGRAM TRACING: CPT

## 2021-09-08 PROCEDURE — 99284 EMERGENCY DEPT VISIT MOD MDM: CPT | Mod: 25

## 2021-09-08 PROCEDURE — 80053 COMPREHEN METABOLIC PANEL: CPT

## 2021-09-08 PROCEDURE — 99285 EMERGENCY DEPT VISIT HI MDM: CPT

## 2021-09-08 PROCEDURE — 85025 COMPLETE CBC W/AUTO DIFF WBC: CPT

## 2021-09-08 PROCEDURE — 93010 ELECTROCARDIOGRAM REPORT: CPT

## 2021-09-08 PROCEDURE — 96375 TX/PRO/DX INJ NEW DRUG ADDON: CPT

## 2021-09-08 PROCEDURE — 74177 CT ABD & PELVIS W/CONTRAST: CPT | Mod: 26,ME

## 2021-09-08 RX ORDER — HYDROMORPHONE HYDROCHLORIDE 2 MG/ML
1 INJECTION INTRAMUSCULAR; INTRAVENOUS; SUBCUTANEOUS ONCE
Refills: 0 | Status: DISCONTINUED | OUTPATIENT
Start: 2021-09-08 | End: 2021-09-08

## 2021-09-08 RX ORDER — ONDANSETRON 8 MG/1
4 TABLET, FILM COATED ORAL ONCE
Refills: 0 | Status: COMPLETED | OUTPATIENT
Start: 2021-09-08 | End: 2021-09-08

## 2021-09-08 RX ORDER — HALOPERIDOL DECANOATE 100 MG/ML
5 INJECTION INTRAMUSCULAR ONCE
Refills: 0 | Status: COMPLETED | OUTPATIENT
Start: 2021-09-08 | End: 2021-09-08

## 2021-09-08 RX ORDER — SODIUM CHLORIDE 9 MG/ML
1000 INJECTION INTRAMUSCULAR; INTRAVENOUS; SUBCUTANEOUS ONCE
Refills: 0 | Status: COMPLETED | OUTPATIENT
Start: 2021-09-08 | End: 2021-09-08

## 2021-09-08 RX ORDER — MORPHINE SULFATE 50 MG/1
4 CAPSULE, EXTENDED RELEASE ORAL ONCE
Refills: 0 | Status: COMPLETED | OUTPATIENT
Start: 2021-09-08 | End: 2021-09-08

## 2021-09-08 RX ORDER — KETOROLAC TROMETHAMINE 30 MG/ML
30 SYRINGE (ML) INJECTION ONCE
Refills: 0 | Status: DISCONTINUED | OUTPATIENT
Start: 2021-09-08 | End: 2021-09-08

## 2021-09-08 RX ADMIN — Medication 30 MILLIGRAM(S): at 10:32

## 2021-09-08 RX ADMIN — SODIUM CHLORIDE 1000 MILLILITER(S): 9 INJECTION INTRAMUSCULAR; INTRAVENOUS; SUBCUTANEOUS at 11:40

## 2021-09-08 RX ADMIN — Medication 1 MILLIGRAM(S): at 12:25

## 2021-09-08 RX ADMIN — HALOPERIDOL DECANOATE 5 MILLIGRAM(S): 100 INJECTION INTRAMUSCULAR at 12:25

## 2021-09-08 RX ADMIN — ONDANSETRON 4 MILLIGRAM(S): 8 TABLET, FILM COATED ORAL at 11:40

## 2021-09-08 RX ADMIN — SODIUM CHLORIDE 1000 MILLILITER(S): 9 INJECTION INTRAMUSCULAR; INTRAVENOUS; SUBCUTANEOUS at 10:33

## 2021-09-08 RX ADMIN — HYDROMORPHONE HYDROCHLORIDE 1 MILLIGRAM(S): 2 INJECTION INTRAMUSCULAR; INTRAVENOUS; SUBCUTANEOUS at 11:40

## 2021-09-08 RX ADMIN — ONDANSETRON 4 MILLIGRAM(S): 8 TABLET, FILM COATED ORAL at 10:32

## 2021-09-08 NOTE — ED ADULT NURSE NOTE - OBJECTIVE STATEMENT
Pt A&OX3, amb ad ely, biba for abd pain, N/V/D, no fevers.  Pt is very loud and expressive.  Abd soft nondistended, nontender, moving all ext well.

## 2021-09-08 NOTE — ED PROVIDER NOTE - CLINICAL SUMMARY MEDICAL DECISION MAKING FREE TEXT BOX
Pt is a 36y M with PMH of polysubstance abuse/cyclic vomiting/ colitis presenting for abd pain nausea/vomiting x 4 hours. Will check labs/give meds and CT and reassess.

## 2021-09-08 NOTE — ED PROVIDER NOTE - PROGRESS NOTE DETAILS
Pt now sleeping in stretcher. CT negative for acute findings. Pt is screaming and flailing on the floor of the ER complaining of severe pain now worse than typical abd pain after medication. CT scan ordered but pt states he cannot tolerate sitting still for imaging. More medication ordered.

## 2021-09-08 NOTE — ED PROVIDER NOTE - NSFOLLOWUPINSTRUCTIONS_ED_ALL_ED_FT
Follow up with GI.  Come back with new or worsening symptoms.    Abdominal Pain    Many things can cause abdominal pain. Many times, abdominal pain is not caused by a disease and will improve without treatment. Your health care provider will do a physical exam to determine if there is a dangerous cause of your pain; blood tests and imaging may help determine the cause of your pain. However, in many cases, no cause may be found and you may need further testing as an outpatient. Monitor your abdominal pain for any changes.     SEEK IMMEDIATE MEDICAL CARE IF YOU HAVE ANY OF THE FOLLOWING SYMPTOMS: worsening abdominal pain, uncontrollable vomiting, profuse diarrhea, inability to have bowel movements or pass gas, black or bloody stools, fever accompanying chest pain or back pain, or fainting. These symptoms may represent a serious problem that is an emergency. Do not wait to see if the symptoms will go away. Get medical help right away. Call 911 and do not drive yourself to the hospital.

## 2021-09-08 NOTE — ED PROVIDER NOTE - OBJECTIVE STATEMENT
Pt is a 36y M BIBA with PMH of polysubstance abuse and HX of colitis/ cyclic vomiting presenting for abd pain/n/v x 4 hours. Pt states he has pain in his L lower abd. He reports severe pain similar to previous episodes of gastritis. Pt states he has not taken any medication at home. He reports last episode was in June. Pt denies any hematemesis/diarrhea/testicular pain or urinary symptoms/chest pain. Pt has allergy to PNC. Pt is a 36y M BIBA with PMH of polysubstance abuse and HX of colitis/ cyclic vomiting presenting for abd pain/n/v x 4 hours. Pt states he has pain in his L and R lower abd. He reports severe pain similar to previous episodes of gastritis. Pt states he has not taken any medication at home. He reports last episode was in June. Pt denies any hematemesis/diarrhea/testicular pain or urinary symptoms/chest pain. Pt has allergy to PNC.

## 2021-09-08 NOTE — ED PROVIDER NOTE - CARE PROVIDER_API CALL
Andreina Kramer (DO)  Gastroenterology  [address], [address]  [place] [address]  Phone: [phone]  Fax: [phone]  Follow Up Time:

## 2021-09-08 NOTE — ED ADULT NURSE REASSESSMENT NOTE - NS ED NURSE REASSESS COMMENT FT1
Pt. medicated with 4mg zofran IVP and 1mg dilaudid IVP as per MAR @ 1140. Pt. yelling at staff and refusing to go to CT scan, "I need more pain meds, they never find anything on the CT scan." Pt. stating "I need to be admitted for pain meds." REZA Aquino made aware. Pt. educated on need for CT to effectively treat pain.
Pt. returned from CT, refused scan, "I need more pain meds." REZA Aquino made aware.
Pt. safe for discharge as per provider. Vital signs stable and as charted. Pt. at baseline neuro status. Discussed patient specific discharge teaching with pt, pt verbalized understanding. All questions answered. IV catheter discontinued. Pt. discharged as per orders.

## 2021-09-08 NOTE — ED PROVIDER NOTE - PATIENT PORTAL LINK FT
You can access the FollowMyHealth Patient Portal offered by [facility] by registering at the following website: [email or web address]. By joining [employer]’s FollowMyHealth portal, you will also be able to view your health information using other applications (apps) compatible with our system.

## 2021-09-08 NOTE — ED PROVIDER NOTE - ATTENDING CONTRIBUTION TO CARE
The patient seen and examined    Cyclic Vomiting Syndrome    I, Polo Donaldson, performed the initial face to face bedside interview with this patient regarding history of present illness, review of symptoms and relevant past medical, social and family history.  I completed an independent physical examination.  I was the initial provider who evaluated this patient. I have signed out the follow up of any pending tests (i.e. labs, radiological studies) to the ACP.  I have communicated the patient’s plan of care and disposition with the ACP.

## 2021-09-14 NOTE — ED PROVIDER NOTE - IV ALTEPLASE INCLUSION HIDDEN
Head,  normocephalic,  atraumatic,  Face,  Face within normal limits,  Ears,  External ears within normal limits,
show

## 2021-11-07 ENCOUNTER — EMERGENCY (EMERGENCY)
Facility: HOSPITAL | Age: 36
LOS: 1 days | Discharge: HOME | End: 2021-11-07
Attending: EMERGENCY MEDICINE
Payer: MEDICAID

## 2021-11-07 VITALS
WEIGHT: 160.06 LBS | OXYGEN SATURATION: 100 % | DIASTOLIC BLOOD PRESSURE: 100 MMHG | SYSTOLIC BLOOD PRESSURE: 130 MMHG | HEIGHT: 71 IN | HEART RATE: 78 BPM | RESPIRATION RATE: 18 BRPM

## 2021-11-07 LAB
ALBUMIN SERPL ELPH-MCNC: 4.8 G/DL — SIGNIFICANT CHANGE UP (ref 3.3–5.2)
ALP SERPL-CCNC: 80 U/L — SIGNIFICANT CHANGE UP (ref 40–120)
ALT FLD-CCNC: 17 U/L — SIGNIFICANT CHANGE UP
ANION GAP SERPL CALC-SCNC: 18 MMOL/L — HIGH (ref 5–17)
AST SERPL-CCNC: 17 U/L — SIGNIFICANT CHANGE UP
BASOPHILS # BLD AUTO: 0.06 K/UL — SIGNIFICANT CHANGE UP (ref 0–0.2)
BASOPHILS NFR BLD AUTO: 0.6 % — SIGNIFICANT CHANGE UP (ref 0–2)
BILIRUB SERPL-MCNC: 0.4 MG/DL — SIGNIFICANT CHANGE UP (ref 0.4–2)
BUN SERPL-MCNC: 14.9 MG/DL — SIGNIFICANT CHANGE UP (ref 8–20)
CALCIUM SERPL-MCNC: 9.6 MG/DL — SIGNIFICANT CHANGE UP (ref 8.6–10.2)
CHLORIDE SERPL-SCNC: 99 MMOL/L — SIGNIFICANT CHANGE UP (ref 98–107)
CO2 SERPL-SCNC: 22 MMOL/L — SIGNIFICANT CHANGE UP (ref 22–29)
CREAT SERPL-MCNC: 0.81 MG/DL — SIGNIFICANT CHANGE UP (ref 0.5–1.3)
EOSINOPHIL # BLD AUTO: 0.44 K/UL — SIGNIFICANT CHANGE UP (ref 0–0.5)
EOSINOPHIL NFR BLD AUTO: 4.5 % — SIGNIFICANT CHANGE UP (ref 0–6)
GLUCOSE SERPL-MCNC: 126 MG/DL — HIGH (ref 70–99)
HCT VFR BLD CALC: 45.1 % — SIGNIFICANT CHANGE UP (ref 39–50)
HGB BLD-MCNC: 16.5 G/DL — SIGNIFICANT CHANGE UP (ref 13–17)
IMM GRANULOCYTES NFR BLD AUTO: 0.3 % — SIGNIFICANT CHANGE UP (ref 0–1.5)
LIDOCAIN IGE QN: 39 U/L — SIGNIFICANT CHANGE UP (ref 22–51)
LYMPHOCYTES # BLD AUTO: 2.42 K/UL — SIGNIFICANT CHANGE UP (ref 1–3.3)
LYMPHOCYTES # BLD AUTO: 24.9 % — SIGNIFICANT CHANGE UP (ref 13–44)
MCHC RBC-ENTMCNC: 34.4 PG — HIGH (ref 27–34)
MCHC RBC-ENTMCNC: 36.6 GM/DL — HIGH (ref 32–36)
MCV RBC AUTO: 94.2 FL — SIGNIFICANT CHANGE UP (ref 80–100)
MONOCYTES # BLD AUTO: 0.77 K/UL — SIGNIFICANT CHANGE UP (ref 0–0.9)
MONOCYTES NFR BLD AUTO: 7.9 % — SIGNIFICANT CHANGE UP (ref 2–14)
NEUTROPHILS # BLD AUTO: 5.98 K/UL — SIGNIFICANT CHANGE UP (ref 1.8–7.4)
NEUTROPHILS NFR BLD AUTO: 61.8 % — SIGNIFICANT CHANGE UP (ref 43–77)
PLATELET # BLD AUTO: 269 K/UL — SIGNIFICANT CHANGE UP (ref 150–400)
POTASSIUM SERPL-MCNC: 3.7 MMOL/L — SIGNIFICANT CHANGE UP (ref 3.5–5.3)
POTASSIUM SERPL-SCNC: 3.7 MMOL/L — SIGNIFICANT CHANGE UP (ref 3.5–5.3)
PROT SERPL-MCNC: 7.4 G/DL — SIGNIFICANT CHANGE UP (ref 6.6–8.7)
RBC # BLD: 4.79 M/UL — SIGNIFICANT CHANGE UP (ref 4.2–5.8)
RBC # FLD: 11.4 % — SIGNIFICANT CHANGE UP (ref 10.3–14.5)
SODIUM SERPL-SCNC: 139 MMOL/L — SIGNIFICANT CHANGE UP (ref 135–145)
WBC # BLD: 9.7 K/UL — SIGNIFICANT CHANGE UP (ref 3.8–10.5)
WBC # FLD AUTO: 9.7 K/UL — SIGNIFICANT CHANGE UP (ref 3.8–10.5)

## 2021-11-07 PROCEDURE — 74177 CT ABD & PELVIS W/CONTRAST: CPT | Mod: MA

## 2021-11-07 PROCEDURE — 74177 CT ABD & PELVIS W/CONTRAST: CPT | Mod: 26,MA

## 2021-11-07 PROCEDURE — 85025 COMPLETE CBC W/AUTO DIFF WBC: CPT

## 2021-11-07 PROCEDURE — 96376 TX/PRO/DX INJ SAME DRUG ADON: CPT

## 2021-11-07 PROCEDURE — 99285 EMERGENCY DEPT VISIT HI MDM: CPT

## 2021-11-07 PROCEDURE — 96375 TX/PRO/DX INJ NEW DRUG ADDON: CPT

## 2021-11-07 PROCEDURE — 36415 COLL VENOUS BLD VENIPUNCTURE: CPT

## 2021-11-07 PROCEDURE — 83690 ASSAY OF LIPASE: CPT

## 2021-11-07 PROCEDURE — 99284 EMERGENCY DEPT VISIT MOD MDM: CPT | Mod: 25

## 2021-11-07 PROCEDURE — 80053 COMPREHEN METABOLIC PANEL: CPT

## 2021-11-07 PROCEDURE — 96374 THER/PROPH/DIAG INJ IV PUSH: CPT | Mod: XU

## 2021-11-07 RX ORDER — MORPHINE SULFATE 50 MG/1
2 CAPSULE, EXTENDED RELEASE ORAL ONCE
Refills: 0 | Status: DISCONTINUED | OUTPATIENT
Start: 2021-11-07 | End: 2021-11-07

## 2021-11-07 RX ORDER — PANTOPRAZOLE SODIUM 20 MG/1
40 TABLET, DELAYED RELEASE ORAL ONCE
Refills: 0 | Status: COMPLETED | OUTPATIENT
Start: 2021-11-07 | End: 2021-11-07

## 2021-11-07 RX ORDER — ONDANSETRON 8 MG/1
4 TABLET, FILM COATED ORAL ONCE
Refills: 0 | Status: COMPLETED | OUTPATIENT
Start: 2021-11-07 | End: 2021-11-07

## 2021-11-07 RX ORDER — KETOROLAC TROMETHAMINE 30 MG/ML
30 SYRINGE (ML) INJECTION ONCE
Refills: 0 | Status: DISCONTINUED | OUTPATIENT
Start: 2021-11-07 | End: 2021-11-07

## 2021-11-07 RX ORDER — HALOPERIDOL DECANOATE 100 MG/ML
5 INJECTION INTRAMUSCULAR ONCE
Refills: 0 | Status: COMPLETED | OUTPATIENT
Start: 2021-11-07 | End: 2021-11-07

## 2021-11-07 RX ORDER — METOCLOPRAMIDE HCL 10 MG
1 TABLET ORAL
Qty: 15 | Refills: 0
Start: 2021-11-07 | End: 2021-11-11

## 2021-11-07 RX ORDER — METOCLOPRAMIDE HCL 10 MG
10 TABLET ORAL ONCE
Refills: 0 | Status: COMPLETED | OUTPATIENT
Start: 2021-11-07 | End: 2021-11-07

## 2021-11-07 RX ORDER — SODIUM CHLORIDE 9 MG/ML
2000 INJECTION INTRAMUSCULAR; INTRAVENOUS; SUBCUTANEOUS ONCE
Refills: 0 | Status: COMPLETED | OUTPATIENT
Start: 2021-11-07 | End: 2021-11-07

## 2021-11-07 RX ADMIN — ONDANSETRON 4 MILLIGRAM(S): 8 TABLET, FILM COATED ORAL at 08:31

## 2021-11-07 RX ADMIN — HALOPERIDOL DECANOATE 5 MILLIGRAM(S): 100 INJECTION INTRAMUSCULAR at 08:31

## 2021-11-07 RX ADMIN — ONDANSETRON 4 MILLIGRAM(S): 8 TABLET, FILM COATED ORAL at 14:52

## 2021-11-07 RX ADMIN — Medication 2 MILLIGRAM(S): at 09:45

## 2021-11-07 RX ADMIN — ONDANSETRON 4 MILLIGRAM(S): 8 TABLET, FILM COATED ORAL at 09:45

## 2021-11-07 RX ADMIN — MORPHINE SULFATE 2 MILLIGRAM(S): 50 CAPSULE, EXTENDED RELEASE ORAL at 09:45

## 2021-11-07 RX ADMIN — Medication 30 MILLIGRAM(S): at 08:31

## 2021-11-07 RX ADMIN — Medication 10 MILLIGRAM(S): at 12:10

## 2021-11-07 RX ADMIN — SODIUM CHLORIDE 1000 MILLILITER(S): 9 INJECTION INTRAMUSCULAR; INTRAVENOUS; SUBCUTANEOUS at 08:36

## 2021-11-07 RX ADMIN — PANTOPRAZOLE SODIUM 40 MILLIGRAM(S): 20 TABLET, DELAYED RELEASE ORAL at 08:36

## 2021-11-07 NOTE — ED PROVIDER NOTE - OBJECTIVE STATEMENT
35 yo male pmh polysubstance abuse, gastritis, pancreatitis comes to ed with few hour history of abdominal pain; pt noted similar symptoms in the past ; pt txed with prilosec in the past;

## 2021-11-07 NOTE — ED PROVIDER NOTE - PATIENT PORTAL LINK FT
You can access the FollowMyHealth Patient Portal offered by [facility] by registering at the following website: [email or web address]. By joining [employer]’s FollowMyHealth portal, you will also be able to view your health information using other applications (apps) compatible with our system.

## 2021-11-07 NOTE — ED PROVIDER NOTE - PHYSICAL EXAMINATION
Alert, lucid, and in no apparent distress. Pt is normocephalic, atraumatic.  Pupils are equal, round, lips pink, moist mucous membranes, tongue midline. Neck supple.   Lungs clear to auscultation. Heart regular rate and rhythm, normal S1, S2, no murmurs, gallops, rubs.  Abdomen is soft, tender to palp over epigastric region, no pulsatile mass, no masses,    Non-focal sensory, 5 out of 5 motor strength; Skin without rash,   No submandibular adenopathy. Normal mentation, does not appear agitated

## 2021-12-22 ENCOUNTER — EMERGENCY (EMERGENCY)
Facility: HOSPITAL | Age: 36
LOS: 1 days | Discharge: HOME | End: 2021-12-22
Attending: EMERGENCY MEDICINE
Payer: MEDICAID

## 2021-12-22 VITALS
TEMPERATURE: 98 F | RESPIRATION RATE: 18 BRPM | DIASTOLIC BLOOD PRESSURE: 82 MMHG | OXYGEN SATURATION: 100 % | HEART RATE: 87 BPM | HEIGHT: 71 IN | SYSTOLIC BLOOD PRESSURE: 151 MMHG

## 2021-12-22 LAB
ALBUMIN SERPL ELPH-MCNC: 4.7 G/DL — SIGNIFICANT CHANGE UP (ref 3.3–5.2)
ALP SERPL-CCNC: 79 U/L — SIGNIFICANT CHANGE UP (ref 40–120)
ALT FLD-CCNC: 48 U/L — HIGH
ANION GAP SERPL CALC-SCNC: 19 MMOL/L — HIGH (ref 5–17)
AST SERPL-CCNC: 38 U/L — SIGNIFICANT CHANGE UP
BASOPHILS # BLD AUTO: 0.06 K/UL — SIGNIFICANT CHANGE UP (ref 0–0.2)
BASOPHILS NFR BLD AUTO: 0.6 % — SIGNIFICANT CHANGE UP (ref 0–2)
BILIRUB SERPL-MCNC: 0.3 MG/DL — LOW (ref 0.4–2)
BUN SERPL-MCNC: 15.1 MG/DL — SIGNIFICANT CHANGE UP (ref 8–20)
CALCIUM SERPL-MCNC: 10.2 MG/DL — SIGNIFICANT CHANGE UP (ref 8.6–10.2)
CHLORIDE SERPL-SCNC: 102 MMOL/L — SIGNIFICANT CHANGE UP (ref 98–107)
CO2 SERPL-SCNC: 20 MMOL/L — LOW (ref 22–29)
CREAT SERPL-MCNC: 0.78 MG/DL — SIGNIFICANT CHANGE UP (ref 0.5–1.3)
EOSINOPHIL # BLD AUTO: 0.49 K/UL — SIGNIFICANT CHANGE UP (ref 0–0.5)
EOSINOPHIL NFR BLD AUTO: 5.1 % — SIGNIFICANT CHANGE UP (ref 0–6)
GLUCOSE SERPL-MCNC: 143 MG/DL — HIGH (ref 70–99)
HCT VFR BLD CALC: 43.9 % — SIGNIFICANT CHANGE UP (ref 39–50)
HGB BLD-MCNC: 16 G/DL — SIGNIFICANT CHANGE UP (ref 13–17)
IMM GRANULOCYTES NFR BLD AUTO: 0.3 % — SIGNIFICANT CHANGE UP (ref 0–1.5)
LIDOCAIN IGE QN: 27 U/L — SIGNIFICANT CHANGE UP (ref 22–51)
LYMPHOCYTES # BLD AUTO: 3.07 K/UL — SIGNIFICANT CHANGE UP (ref 1–3.3)
LYMPHOCYTES # BLD AUTO: 32.1 % — SIGNIFICANT CHANGE UP (ref 13–44)
MCHC RBC-ENTMCNC: 34.4 PG — HIGH (ref 27–34)
MCHC RBC-ENTMCNC: 36.4 GM/DL — HIGH (ref 32–36)
MCV RBC AUTO: 94.4 FL — SIGNIFICANT CHANGE UP (ref 80–100)
MONOCYTES # BLD AUTO: 0.67 K/UL — SIGNIFICANT CHANGE UP (ref 0–0.9)
MONOCYTES NFR BLD AUTO: 7 % — SIGNIFICANT CHANGE UP (ref 2–14)
NEUTROPHILS # BLD AUTO: 5.24 K/UL — SIGNIFICANT CHANGE UP (ref 1.8–7.4)
NEUTROPHILS NFR BLD AUTO: 54.9 % — SIGNIFICANT CHANGE UP (ref 43–77)
PLATELET # BLD AUTO: 235 K/UL — SIGNIFICANT CHANGE UP (ref 150–400)
POTASSIUM SERPL-MCNC: 3.4 MMOL/L — LOW (ref 3.5–5.3)
POTASSIUM SERPL-SCNC: 3.4 MMOL/L — LOW (ref 3.5–5.3)
PROT SERPL-MCNC: 7 G/DL — SIGNIFICANT CHANGE UP (ref 6.6–8.7)
RBC # BLD: 4.65 M/UL — SIGNIFICANT CHANGE UP (ref 4.2–5.8)
RBC # FLD: 11.8 % — SIGNIFICANT CHANGE UP (ref 10.3–14.5)
SODIUM SERPL-SCNC: 141 MMOL/L — SIGNIFICANT CHANGE UP (ref 135–145)
WBC # BLD: 9.56 K/UL — SIGNIFICANT CHANGE UP (ref 3.8–10.5)
WBC # FLD AUTO: 9.56 K/UL — SIGNIFICANT CHANGE UP (ref 3.8–10.5)

## 2021-12-22 PROCEDURE — 80053 COMPREHEN METABOLIC PANEL: CPT

## 2021-12-22 PROCEDURE — 36415 COLL VENOUS BLD VENIPUNCTURE: CPT

## 2021-12-22 PROCEDURE — 99284 EMERGENCY DEPT VISIT MOD MDM: CPT

## 2021-12-22 PROCEDURE — 96374 THER/PROPH/DIAG INJ IV PUSH: CPT

## 2021-12-22 PROCEDURE — 83690 ASSAY OF LIPASE: CPT

## 2021-12-22 PROCEDURE — 85025 COMPLETE CBC W/AUTO DIFF WBC: CPT

## 2021-12-22 PROCEDURE — 96375 TX/PRO/DX INJ NEW DRUG ADDON: CPT

## 2021-12-22 PROCEDURE — 99284 EMERGENCY DEPT VISIT MOD MDM: CPT | Mod: 25

## 2021-12-22 RX ORDER — ONDANSETRON 8 MG/1
4 TABLET, FILM COATED ORAL ONCE
Refills: 0 | Status: DISCONTINUED | OUTPATIENT
Start: 2021-12-22 | End: 2021-12-26

## 2021-12-22 RX ORDER — SODIUM CHLORIDE 9 MG/ML
1000 INJECTION INTRAMUSCULAR; INTRAVENOUS; SUBCUTANEOUS ONCE
Refills: 0 | Status: COMPLETED | OUTPATIENT
Start: 2021-12-22 | End: 2021-12-22

## 2021-12-22 RX ORDER — ONDANSETRON 8 MG/1
1 TABLET, FILM COATED ORAL
Qty: 9 | Refills: 0
Start: 2021-12-22 | End: 2021-12-24

## 2021-12-22 RX ORDER — HALOPERIDOL DECANOATE 100 MG/ML
5 INJECTION INTRAMUSCULAR ONCE
Refills: 0 | Status: COMPLETED | OUTPATIENT
Start: 2021-12-22 | End: 2021-12-22

## 2021-12-22 RX ADMIN — Medication 2 MILLIGRAM(S): at 04:21

## 2021-12-22 RX ADMIN — HALOPERIDOL DECANOATE 5 MILLIGRAM(S): 100 INJECTION INTRAMUSCULAR at 04:21

## 2021-12-22 RX ADMIN — SODIUM CHLORIDE 1000 MILLILITER(S): 9 INJECTION INTRAMUSCULAR; INTRAVENOUS; SUBCUTANEOUS at 04:21

## 2021-12-22 NOTE — ED PROVIDER NOTE - PATIENT PORTAL LINK FT
You can access the FollowMyHealth Patient Portal offered by [facility] by registering at the following website: [email or web address]. By joining [employer]’s FollowMyHealth portal, you will also be able to view your health information using other applications (apps) compatible with our system.

## 2021-12-22 NOTE — ED PROVIDER NOTE - PROGRESS NOTE DETAILS
pt resting comfortably, pain and vomiting improved. will dc with zofran. pt instructed he needs to f/u GI

## 2021-12-22 NOTE — ED ADULT NURSE NOTE - BOWEL SOUNDS LLQ
Patient was not available for their therapy session at this time.  Reason not seen: Nursing requests holding patient due to medical status (intubated sedated ) (08/12/17 0741).    Re-Attempt Plan: Will re-attempt tomorrow;Will re-attempt per established treatment plan (08/12/17 0741).   present

## 2021-12-22 NOTE — ED PROVIDER NOTE - CLINICAL SUMMARY MEDICAL DECISION MAKING FREE TEXT BOX
37yo M presenting with abd pain, vomiting, many previous visits for same complaints. symptoms consistent with previous visits. previous ct scans in ED without any findings. pt demanding dilaudid. will give IVF, check labs and avoid narcotic pain medication for pt 37yo M presenting with abd pain, vomiting, many previous visits for same complaint. symptoms consistent with previous visits. multiple previous ct abd/pelvis scans in ED without any findings. pt demanding dilaudid. will give IVF, check labs and avoid narcotic pain medication for pt

## 2021-12-22 NOTE — ED PROVIDER NOTE - CARE PROVIDER_API CALL
Sandor Lopez)  Gastroenterology; Internal Medicine  [address] [place] [address], [place] [address]  Phone: [phone]  Fax: [phone]  Follow Up Time:

## 2021-12-22 NOTE — ED PROVIDER NOTE - PHYSICAL EXAMINATION
Gen: Rocking back in forth in bed, refusing to make eye contact, screaming demanding dilaudid  Head: NCAT  CV: RRR, nl s1/s2.  Resp: CTAB, normal rate and effort  GI: Refused abd exam  Neuro: A&O x 3, moving all 4 extremities  MSK: No spine or joint tenderness to palpation  Skin: No rashes. intact and perfused.

## 2021-12-22 NOTE — ED PROVIDER NOTE - NSFOLLOWUPINSTRUCTIONS_ED_ALL_ED_FT
- Follow up with your doctor within 2-3 days.   - Return to the ED for any new or worsening symptoms.   - FOLLOW-UP WITH GI DOCTOR FOR FURTHER EVALUATION OF SYMPTOMS    Abdominal Pain    Many things can cause abdominal pain. Many times, abdominal pain is not caused by a disease and will improve without treatment. Your health care provider will do a physical exam to determine if there is a dangerous cause of your pain; blood tests and imaging may help determine the cause of your pain. However, in many cases, no cause may be found and you may need further testing as an outpatient. Monitor your abdominal pain for any changes.     SEEK IMMEDIATE MEDICAL CARE IF YOU HAVE ANY OF THE FOLLOWING SYMPTOMS: worsening abdominal pain, uncontrollable vomiting, profuse diarrhea, inability to have bowel movements or pass gas, black or bloody stools, fever accompanying chest pain or back pain, or fainting. These symptoms may represent a serious problem that is an emergency. Do not wait to see if the symptoms will go away. Get medical help right away. Call 911 and do not drive yourself to the hospital.

## 2021-12-22 NOTE — ED PROVIDER NOTE - NS ED ROS FT
Gen: denies fever, chills, fatigue, weight loss  Skin: denies rashes, laceration, bruising  HEENT: denies visual changes, ear pain, nasal congestion, throat pain  Respiratory: denies DEWITT, SOB, cough, wheezing  Cardiovascular: denies chest pain, palpitations, diaphoresis, LE edema  GI: +Abd pain, +N/V. Denies diarrhea.   : denies dysuria, frequency, urgency, bowel/bladder incontinence  MSK: denies joint swelling/pain, back pain, neck pain  Neuro: denies headache, dizziness, weakness, numbness  Psych: denies anxiety, depression, SI/HI, visual/auditory hallucinations

## 2021-12-22 NOTE — ED PROVIDER NOTE - OBJECTIVE STATEMENT
37yo M pmhx polysubstance abuse, cyclical vomiting syndrome, pancreatitis, gastritis presents to ED c/o epigastric abd pain and vomiting x 4 hours. Pt states symptoms are consistent with his previous flares of abd pain. Has not followed with GI in >1 year. Many previous visits for same, pt has had 15 previous ct scans without any significant findings over past 8 years. Admits to smoking marijuana prior to arrival. Denies fever, chills, diarrhea, urinary sx.

## 2021-12-22 NOTE — ED ADULT TRIAGE NOTE - CHIEF COMPLAINT QUOTE
patient with complaints of vomiting which started 15 minutes after smoking marijuana. denies any other complaints at this time

## 2022-03-01 ENCOUNTER — EMERGENCY (EMERGENCY)
Facility: HOSPITAL | Age: 37
LOS: 1 days | Discharge: HOME | End: 2022-03-01
Attending: EMERGENCY MEDICINE
Payer: MEDICAID

## 2022-03-01 VITALS
TEMPERATURE: 98 F | WEIGHT: 160.06 LBS | HEIGHT: 71 IN | RESPIRATION RATE: 20 BRPM | OXYGEN SATURATION: 100 % | HEART RATE: 84 BPM | DIASTOLIC BLOOD PRESSURE: 88 MMHG | SYSTOLIC BLOOD PRESSURE: 147 MMHG

## 2022-03-01 LAB
ALBUMIN SERPL ELPH-MCNC: 4.8 G/DL — SIGNIFICANT CHANGE UP (ref 3.3–5.2)
ALP SERPL-CCNC: 79 U/L — SIGNIFICANT CHANGE UP (ref 40–120)
ALT FLD-CCNC: 20 U/L — SIGNIFICANT CHANGE UP
ANION GAP SERPL CALC-SCNC: 21 MMOL/L — HIGH (ref 5–17)
AST SERPL-CCNC: 16 U/L — SIGNIFICANT CHANGE UP
BASOPHILS # BLD AUTO: 0.06 K/UL — SIGNIFICANT CHANGE UP (ref 0–0.2)
BASOPHILS NFR BLD AUTO: 0.6 % — SIGNIFICANT CHANGE UP (ref 0–2)
BILIRUB SERPL-MCNC: 0.4 MG/DL — SIGNIFICANT CHANGE UP (ref 0.4–2)
BUN SERPL-MCNC: 21.4 MG/DL — HIGH (ref 8–20)
CALCIUM SERPL-MCNC: 10 MG/DL — SIGNIFICANT CHANGE UP (ref 8.6–10.2)
CHLORIDE SERPL-SCNC: 101 MMOL/L — SIGNIFICANT CHANGE UP (ref 98–107)
CO2 SERPL-SCNC: 18 MMOL/L — LOW (ref 22–29)
CREAT SERPL-MCNC: 0.86 MG/DL — SIGNIFICANT CHANGE UP (ref 0.5–1.3)
EGFR: 116 ML/MIN/1.73M2 — SIGNIFICANT CHANGE UP
EOSINOPHIL # BLD AUTO: 0.31 K/UL — SIGNIFICANT CHANGE UP (ref 0–0.5)
EOSINOPHIL NFR BLD AUTO: 2.9 % — SIGNIFICANT CHANGE UP (ref 0–6)
GLUCOSE SERPL-MCNC: 124 MG/DL — HIGH (ref 70–99)
HCT VFR BLD CALC: 44.6 % — SIGNIFICANT CHANGE UP (ref 39–50)
HGB BLD-MCNC: 16.2 G/DL — SIGNIFICANT CHANGE UP (ref 13–17)
IMM GRANULOCYTES NFR BLD AUTO: 0.5 % — SIGNIFICANT CHANGE UP (ref 0–1.5)
LIDOCAIN IGE QN: 63 U/L — HIGH (ref 22–51)
LYMPHOCYTES # BLD AUTO: 2.21 K/UL — SIGNIFICANT CHANGE UP (ref 1–3.3)
LYMPHOCYTES # BLD AUTO: 20.3 % — SIGNIFICANT CHANGE UP (ref 13–44)
MCHC RBC-ENTMCNC: 34.2 PG — HIGH (ref 27–34)
MCHC RBC-ENTMCNC: 36.3 GM/DL — HIGH (ref 32–36)
MCV RBC AUTO: 94.1 FL — SIGNIFICANT CHANGE UP (ref 80–100)
MONOCYTES # BLD AUTO: 0.65 K/UL — SIGNIFICANT CHANGE UP (ref 0–0.9)
MONOCYTES NFR BLD AUTO: 6 % — SIGNIFICANT CHANGE UP (ref 2–14)
NEUTROPHILS # BLD AUTO: 7.59 K/UL — HIGH (ref 1.8–7.4)
NEUTROPHILS NFR BLD AUTO: 69.7 % — SIGNIFICANT CHANGE UP (ref 43–77)
PLATELET # BLD AUTO: 275 K/UL — SIGNIFICANT CHANGE UP (ref 150–400)
POTASSIUM SERPL-MCNC: 3.8 MMOL/L — SIGNIFICANT CHANGE UP (ref 3.5–5.3)
POTASSIUM SERPL-SCNC: 3.8 MMOL/L — SIGNIFICANT CHANGE UP (ref 3.5–5.3)
PROT SERPL-MCNC: 7.3 G/DL — SIGNIFICANT CHANGE UP (ref 6.6–8.7)
RAPID RVP RESULT: SIGNIFICANT CHANGE UP
RBC # BLD: 4.74 M/UL — SIGNIFICANT CHANGE UP (ref 4.2–5.8)
RBC # FLD: 12 % — SIGNIFICANT CHANGE UP (ref 10.3–14.5)
SARS-COV-2 RNA SPEC QL NAA+PROBE: SIGNIFICANT CHANGE UP
SODIUM SERPL-SCNC: 140 MMOL/L — SIGNIFICANT CHANGE UP (ref 135–145)
WBC # BLD: 10.87 K/UL — HIGH (ref 3.8–10.5)
WBC # FLD AUTO: 10.87 K/UL — HIGH (ref 3.8–10.5)

## 2022-03-01 PROCEDURE — 99284 EMERGENCY DEPT VISIT MOD MDM: CPT | Mod: 25

## 2022-03-01 PROCEDURE — 36415 COLL VENOUS BLD VENIPUNCTURE: CPT

## 2022-03-01 PROCEDURE — 96374 THER/PROPH/DIAG INJ IV PUSH: CPT | Mod: XU

## 2022-03-01 PROCEDURE — 80053 COMPREHEN METABOLIC PANEL: CPT

## 2022-03-01 PROCEDURE — 83690 ASSAY OF LIPASE: CPT

## 2022-03-01 PROCEDURE — 96375 TX/PRO/DX INJ NEW DRUG ADDON: CPT

## 2022-03-01 PROCEDURE — 0225U NFCT DS DNA&RNA 21 SARSCOV2: CPT

## 2022-03-01 PROCEDURE — 99285 EMERGENCY DEPT VISIT HI MDM: CPT

## 2022-03-01 PROCEDURE — 96376 TX/PRO/DX INJ SAME DRUG ADON: CPT

## 2022-03-01 PROCEDURE — 85025 COMPLETE CBC W/AUTO DIFF WBC: CPT

## 2022-03-01 PROCEDURE — 74177 CT ABD & PELVIS W/CONTRAST: CPT | Mod: MA

## 2022-03-01 PROCEDURE — 74177 CT ABD & PELVIS W/CONTRAST: CPT | Mod: 26,MA

## 2022-03-01 RX ORDER — MORPHINE SULFATE 50 MG/1
4 CAPSULE, EXTENDED RELEASE ORAL ONCE
Refills: 0 | Status: DISCONTINUED | OUTPATIENT
Start: 2022-03-01 | End: 2022-03-01

## 2022-03-01 RX ORDER — HALOPERIDOL DECANOATE 100 MG/ML
5 INJECTION INTRAMUSCULAR ONCE
Refills: 0 | Status: COMPLETED | OUTPATIENT
Start: 2022-03-01 | End: 2022-03-01

## 2022-03-01 RX ORDER — HYDROMORPHONE HYDROCHLORIDE 2 MG/ML
0.5 INJECTION INTRAMUSCULAR; INTRAVENOUS; SUBCUTANEOUS ONCE
Refills: 0 | Status: DISCONTINUED | OUTPATIENT
Start: 2022-03-01 | End: 2022-03-01

## 2022-03-01 RX ORDER — ONDANSETRON 8 MG/1
4 TABLET, FILM COATED ORAL ONCE
Refills: 0 | Status: COMPLETED | OUTPATIENT
Start: 2022-03-01 | End: 2022-03-01

## 2022-03-01 RX ORDER — HYDROMORPHONE HYDROCHLORIDE 2 MG/ML
1 INJECTION INTRAMUSCULAR; INTRAVENOUS; SUBCUTANEOUS ONCE
Refills: 0 | Status: DISCONTINUED | OUTPATIENT
Start: 2022-03-01 | End: 2022-03-01

## 2022-03-01 RX ORDER — SODIUM CHLORIDE 9 MG/ML
1000 INJECTION INTRAMUSCULAR; INTRAVENOUS; SUBCUTANEOUS ONCE
Refills: 0 | Status: COMPLETED | OUTPATIENT
Start: 2022-03-01 | End: 2022-03-01

## 2022-03-01 RX ORDER — ONDANSETRON 8 MG/1
8 TABLET, FILM COATED ORAL ONCE
Refills: 0 | Status: COMPLETED | OUTPATIENT
Start: 2022-03-01 | End: 2022-03-01

## 2022-03-01 RX ADMIN — MORPHINE SULFATE 4 MILLIGRAM(S): 50 CAPSULE, EXTENDED RELEASE ORAL at 13:03

## 2022-03-01 RX ADMIN — HYDROMORPHONE HYDROCHLORIDE 1 MILLIGRAM(S): 2 INJECTION INTRAMUSCULAR; INTRAVENOUS; SUBCUTANEOUS at 16:10

## 2022-03-01 RX ADMIN — SODIUM CHLORIDE 1000 MILLILITER(S): 9 INJECTION INTRAMUSCULAR; INTRAVENOUS; SUBCUTANEOUS at 13:03

## 2022-03-01 RX ADMIN — SODIUM CHLORIDE 1000 MILLILITER(S): 9 INJECTION INTRAMUSCULAR; INTRAVENOUS; SUBCUTANEOUS at 20:24

## 2022-03-01 RX ADMIN — ONDANSETRON 4 MILLIGRAM(S): 8 TABLET, FILM COATED ORAL at 16:10

## 2022-03-01 RX ADMIN — HALOPERIDOL DECANOATE 5 MILLIGRAM(S): 100 INJECTION INTRAMUSCULAR at 14:03

## 2022-03-01 RX ADMIN — HALOPERIDOL DECANOATE 5 MILLIGRAM(S): 100 INJECTION INTRAMUSCULAR at 20:23

## 2022-03-01 RX ADMIN — HYDROMORPHONE HYDROCHLORIDE 0.5 MILLIGRAM(S): 2 INJECTION INTRAMUSCULAR; INTRAVENOUS; SUBCUTANEOUS at 13:26

## 2022-03-01 RX ADMIN — HYDROMORPHONE HYDROCHLORIDE 1 MILLIGRAM(S): 2 INJECTION INTRAMUSCULAR; INTRAVENOUS; SUBCUTANEOUS at 13:57

## 2022-03-01 RX ADMIN — HALOPERIDOL DECANOATE 5 MILLIGRAM(S): 100 INJECTION INTRAMUSCULAR at 16:33

## 2022-03-01 RX ADMIN — ONDANSETRON 8 MILLIGRAM(S): 8 TABLET, FILM COATED ORAL at 20:23

## 2022-03-01 RX ADMIN — ONDANSETRON 4 MILLIGRAM(S): 8 TABLET, FILM COATED ORAL at 13:03

## 2022-03-01 NOTE — ED PROVIDER NOTE - PATIENT PORTAL LINK FT
You can access the FollowMyHealth Patient Portal offered by [facility] by registering at the following website: [email or web address]. By joining [employer]’s FollowMyHealth portal, you will also be able to view your health information using other applications (apps) compatible with our system.

## 2022-03-01 NOTE — ED PROVIDER NOTE - OBJECTIVE STATEMENT
The patient is a 36 year old male presents with epigastric pain similar to his pancreatitis.  The patient drank alcohol yesterday and having abdominal pain.  Nausea and vomiting.  No CP, No SOB  The patient also took THC and has cyclic vomiting syndrome

## 2022-03-01 NOTE — ED ADULT TRIAGE NOTE - CHIEF COMPLAINT QUOTE
pt A&Ox4, c/o abd pain and n/v x couple hours, pt has hx Pancreatitis and states he drank etoh yesterday

## 2022-03-13 ENCOUNTER — EMERGENCY (EMERGENCY)
Facility: HOSPITAL | Age: 37
LOS: 1 days | Discharge: HOME | End: 2022-03-13
Attending: STUDENT IN AN ORGANIZED HEALTH CARE EDUCATION/TRAINING PROGRAM
Payer: MEDICAID

## 2022-03-13 VITALS
OXYGEN SATURATION: 96 % | DIASTOLIC BLOOD PRESSURE: 80 MMHG | HEART RATE: 91 BPM | TEMPERATURE: 98 F | RESPIRATION RATE: 16 BRPM | SYSTOLIC BLOOD PRESSURE: 125 MMHG | HEIGHT: 71 IN

## 2022-03-13 VITALS
OXYGEN SATURATION: 96 % | SYSTOLIC BLOOD PRESSURE: 122 MMHG | RESPIRATION RATE: 18 BRPM | DIASTOLIC BLOOD PRESSURE: 81 MMHG | HEART RATE: 89 BPM | TEMPERATURE: 98 F

## 2022-03-13 LAB
ALBUMIN SERPL ELPH-MCNC: 4.6 G/DL — SIGNIFICANT CHANGE UP (ref 3.3–5.2)
ALP SERPL-CCNC: 65 U/L — SIGNIFICANT CHANGE UP (ref 40–120)
ALT FLD-CCNC: 24 U/L — SIGNIFICANT CHANGE UP
ANION GAP SERPL CALC-SCNC: 17 MMOL/L — SIGNIFICANT CHANGE UP (ref 5–17)
APPEARANCE UR: CLEAR — SIGNIFICANT CHANGE UP
AST SERPL-CCNC: 18 U/L — SIGNIFICANT CHANGE UP
BACTERIA # UR AUTO: ABNORMAL
BASOPHILS # BLD AUTO: 0.03 K/UL — SIGNIFICANT CHANGE UP (ref 0–0.2)
BASOPHILS NFR BLD AUTO: 0.4 % — SIGNIFICANT CHANGE UP (ref 0–2)
BILIRUB SERPL-MCNC: 0.3 MG/DL — LOW (ref 0.4–2)
BILIRUB UR-MCNC: NEGATIVE — SIGNIFICANT CHANGE UP
BUN SERPL-MCNC: 15.1 MG/DL — SIGNIFICANT CHANGE UP (ref 8–20)
CALCIUM SERPL-MCNC: 8.5 MG/DL — LOW (ref 8.6–10.2)
CHLORIDE SERPL-SCNC: 104 MMOL/L — SIGNIFICANT CHANGE UP (ref 98–107)
CO2 SERPL-SCNC: 23 MMOL/L — SIGNIFICANT CHANGE UP (ref 22–29)
COLOR SPEC: YELLOW — SIGNIFICANT CHANGE UP
CREAT SERPL-MCNC: 0.67 MG/DL — SIGNIFICANT CHANGE UP (ref 0.5–1.3)
DIFF PNL FLD: NEGATIVE — SIGNIFICANT CHANGE UP
EGFR: 124 ML/MIN/1.73M2 — SIGNIFICANT CHANGE UP
EOSINOPHIL # BLD AUTO: 0.14 K/UL — SIGNIFICANT CHANGE UP (ref 0–0.5)
EOSINOPHIL NFR BLD AUTO: 1.9 % — SIGNIFICANT CHANGE UP (ref 0–6)
EPI CELLS # UR: SIGNIFICANT CHANGE UP
GLUCOSE SERPL-MCNC: 116 MG/DL — HIGH (ref 70–99)
GLUCOSE UR QL: NEGATIVE MG/DL — SIGNIFICANT CHANGE UP
HCT VFR BLD CALC: 42.4 % — SIGNIFICANT CHANGE UP (ref 39–50)
HGB BLD-MCNC: 15.3 G/DL — SIGNIFICANT CHANGE UP (ref 13–17)
IMM GRANULOCYTES NFR BLD AUTO: 0.4 % — SIGNIFICANT CHANGE UP (ref 0–1.5)
KETONES UR-MCNC: NEGATIVE — SIGNIFICANT CHANGE UP
LEUKOCYTE ESTERASE UR-ACNC: NEGATIVE — SIGNIFICANT CHANGE UP
LIDOCAIN IGE QN: 23 U/L — SIGNIFICANT CHANGE UP (ref 22–51)
LYMPHOCYTES # BLD AUTO: 1.68 K/UL — SIGNIFICANT CHANGE UP (ref 1–3.3)
LYMPHOCYTES # BLD AUTO: 23.2 % — SIGNIFICANT CHANGE UP (ref 13–44)
MCHC RBC-ENTMCNC: 34.9 PG — HIGH (ref 27–34)
MCHC RBC-ENTMCNC: 36.1 GM/DL — HIGH (ref 32–36)
MCV RBC AUTO: 96.8 FL — SIGNIFICANT CHANGE UP (ref 80–100)
MONOCYTES # BLD AUTO: 0.5 K/UL — SIGNIFICANT CHANGE UP (ref 0–0.9)
MONOCYTES NFR BLD AUTO: 6.9 % — SIGNIFICANT CHANGE UP (ref 2–14)
NEUTROPHILS # BLD AUTO: 4.87 K/UL — SIGNIFICANT CHANGE UP (ref 1.8–7.4)
NEUTROPHILS NFR BLD AUTO: 67.2 % — SIGNIFICANT CHANGE UP (ref 43–77)
NITRITE UR-MCNC: NEGATIVE — SIGNIFICANT CHANGE UP
PH UR: 6 — SIGNIFICANT CHANGE UP (ref 5–8)
PLATELET # BLD AUTO: 224 K/UL — SIGNIFICANT CHANGE UP (ref 150–400)
POTASSIUM SERPL-MCNC: 3.8 MMOL/L — SIGNIFICANT CHANGE UP (ref 3.5–5.3)
POTASSIUM SERPL-SCNC: 3.8 MMOL/L — SIGNIFICANT CHANGE UP (ref 3.5–5.3)
PROT SERPL-MCNC: 6.6 G/DL — SIGNIFICANT CHANGE UP (ref 6.6–8.7)
PROT UR-MCNC: 15 MG/DL
RBC # BLD: 4.38 M/UL — SIGNIFICANT CHANGE UP (ref 4.2–5.8)
RBC # FLD: 12.1 % — SIGNIFICANT CHANGE UP (ref 10.3–14.5)
RBC CASTS # UR COMP ASSIST: NEGATIVE /HPF — SIGNIFICANT CHANGE UP (ref 0–4)
SODIUM SERPL-SCNC: 144 MMOL/L — SIGNIFICANT CHANGE UP (ref 135–145)
SP GR SPEC: 1.01 — SIGNIFICANT CHANGE UP (ref 1.01–1.02)
UROBILINOGEN FLD QL: NEGATIVE MG/DL — SIGNIFICANT CHANGE UP
WBC # BLD: 7.25 K/UL — SIGNIFICANT CHANGE UP (ref 3.8–10.5)
WBC # FLD AUTO: 7.25 K/UL — SIGNIFICANT CHANGE UP (ref 3.8–10.5)
WBC UR QL: SIGNIFICANT CHANGE UP /HPF (ref 0–5)

## 2022-03-13 PROCEDURE — 87086 URINE CULTURE/COLONY COUNT: CPT

## 2022-03-13 PROCEDURE — 96374 THER/PROPH/DIAG INJ IV PUSH: CPT

## 2022-03-13 PROCEDURE — 36415 COLL VENOUS BLD VENIPUNCTURE: CPT

## 2022-03-13 PROCEDURE — 71045 X-RAY EXAM CHEST 1 VIEW: CPT | Mod: 26

## 2022-03-13 PROCEDURE — 85025 COMPLETE CBC W/AUTO DIFF WBC: CPT

## 2022-03-13 PROCEDURE — 71045 X-RAY EXAM CHEST 1 VIEW: CPT

## 2022-03-13 PROCEDURE — 96375 TX/PRO/DX INJ NEW DRUG ADDON: CPT

## 2022-03-13 PROCEDURE — 99285 EMERGENCY DEPT VISIT HI MDM: CPT | Mod: 25

## 2022-03-13 PROCEDURE — 80053 COMPREHEN METABOLIC PANEL: CPT

## 2022-03-13 PROCEDURE — 81001 URINALYSIS AUTO W/SCOPE: CPT

## 2022-03-13 PROCEDURE — 83690 ASSAY OF LIPASE: CPT

## 2022-03-13 PROCEDURE — 99285 EMERGENCY DEPT VISIT HI MDM: CPT

## 2022-03-13 RX ORDER — SODIUM CHLORIDE 9 MG/ML
1000 INJECTION, SOLUTION INTRAVENOUS ONCE
Refills: 0 | Status: COMPLETED | OUTPATIENT
Start: 2022-03-13 | End: 2022-03-13

## 2022-03-13 RX ORDER — ONDANSETRON 8 MG/1
4 TABLET, FILM COATED ORAL ONCE
Refills: 0 | Status: COMPLETED | OUTPATIENT
Start: 2022-03-13 | End: 2022-03-13

## 2022-03-13 RX ORDER — ACETAMINOPHEN 500 MG
975 TABLET ORAL ONCE
Refills: 0 | Status: COMPLETED | OUTPATIENT
Start: 2022-03-13 | End: 2022-03-13

## 2022-03-13 RX ORDER — HALOPERIDOL DECANOATE 100 MG/ML
2 INJECTION INTRAMUSCULAR ONCE
Refills: 0 | Status: COMPLETED | OUTPATIENT
Start: 2022-03-13 | End: 2022-03-13

## 2022-03-13 RX ADMIN — HALOPERIDOL DECANOATE 2 MILLIGRAM(S): 100 INJECTION INTRAMUSCULAR at 04:15

## 2022-03-13 RX ADMIN — ONDANSETRON 4 MILLIGRAM(S): 8 TABLET, FILM COATED ORAL at 04:15

## 2022-03-13 RX ADMIN — SODIUM CHLORIDE 1000 MILLILITER(S): 9 INJECTION, SOLUTION INTRAVENOUS at 04:15

## 2022-03-13 RX ADMIN — Medication 30 MILLILITER(S): at 04:51

## 2022-03-13 RX ADMIN — Medication 975 MILLIGRAM(S): at 04:15

## 2022-03-13 NOTE — ED PROVIDER NOTE - PHYSICAL EXAMINATION
General: well appearing, NAD  Head: NC, AT  EENT: EOMI, no scleral icterus  Cardiac: RRR, no apparent murmurs, no lower extremity edema  Respiratory: CTABL, no respiratory distress   Abdomen: soft, ND, nonperitonitic, llq ab tenderness, mild epigastric tenderness  MSK/Vascular: full ROM, soft compartments, warm extremities  Neuro: AAOx3, sensation to light touch intact  Psych: calm, cooperative

## 2022-03-13 NOTE — ED ADULT TRIAGE NOTE - CHIEF COMPLAINT QUOTE
Pt. BIBA for abdominal pain with nausea. Pt. states he was drinking earlier today then developed the pain. Pt. was here last week for same thing. hx of pancreatis

## 2022-03-13 NOTE — ED PROVIDER NOTE - PROGRESS NOTE DETAILS
JK - patient aaox3, VSS, comfortable, no longer nauseous, seen ambulating on his own without assistance with no ataxia. Clinically sober and fit for discharge. UA collected. Patient ready and agreeable to DC.

## 2022-03-13 NOTE — ED PROVIDER NOTE - ATTENDING CONTRIBUTION TO CARE
well appearing adult male with cyclical vomiting, p/w n/v and abd pain; on exam, non toxic, no icterus, normal mucosa; normal heart and lung sounds; abd soft with epigastric ttp, no nadia no guarding; no edema, no rash; given history of similar imaging unlikely to yield any benefit; check labs, treat symptomatically, and reasess

## 2022-03-13 NOTE — ED PROVIDER NOTE - PATIENT PORTAL LINK FT
You can access the FollowMyHealth Patient Portal offered by [facility] by registering at the following website: [email or web address]. By joining [employer]’s FollowMyHealth portal, you will also be able to view your health information using other applications (apps) compatible with our system.

## 2022-03-13 NOTE — ED ADULT NURSE NOTE - NS TRANSFER PATIENT BELONGINGS
[Home] : at home, [unfilled] , at the time of the visit. 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 She comes to visit with her friend. She reports that her initial symptoms started with visual difficulty in 2016. She underwent comprehensive diagnostic workup that showed a diagnostic cerebral aneurysm. She underwent endovascular Coiled embolization of cerebral aneurysm in 2016 and then a repeat procedure in 2017. She reports that she fell 6-8 weeks ago and started to experience head pressure. She follow up with Dr. Oh and Head CT and NEW MRA head ordered to evaluate. No intracranial hemorrhage. She denies any gross neurological deficits. \par  None

## 2022-03-13 NOTE — ED PROVIDER NOTE - OBJECTIVE STATEMENT
37 y m with pmh of gastritis, GERD, ETOH abuse, and cyclic vomiting syndrome presenting for abdominal pain and n/v this afternoon. Had been abstinent from alcohol until march 6th and drank again today. Shortly there after had 1 episode 37 y m with pmh of gastritis, GERD, ETOH abuse, and cyclic vomiting syndrome presenting for abdominal pain and n/v this afternoon. Had been abstinent from alcohol until march 6th and drank and smoked weed again today. Shortly there after had 1 episode of nbnb n/v. He is also reporting llq ab pain and dysuria today. He says this feels exactly like every other time he drink/smokes weed and has an vomiting episode. Denying any cp, sob, f/c, diarrhea.

## 2022-03-13 NOTE — ED PROVIDER NOTE - CLINICAL SUMMARY MEDICAL DECISION MAKING FREE TEXT BOX
37 y m with pmh of gastritis, GERD, ETOH abuse, and cyclic vomiting syndrome presenting for abdominal pain and n/v this afternoon. Most suspicious for cyclic vomiting syndrome exacerbation. Will treat symptoms. Will rule out pancreatitis with lipase, perfed ulcer with cxr, UTI with UA/UC.

## 2022-03-14 LAB
CULTURE RESULTS: SIGNIFICANT CHANGE UP
SPECIMEN SOURCE: SIGNIFICANT CHANGE UP

## 2022-03-27 ENCOUNTER — EMERGENCY (EMERGENCY)
Facility: HOSPITAL | Age: 37
LOS: 1 days | Discharge: HOME | End: 2022-03-27
Attending: EMERGENCY MEDICINE
Payer: MEDICAID

## 2022-03-27 VITALS
WEIGHT: 169.98 LBS | RESPIRATION RATE: 22 BRPM | HEIGHT: 71 IN | TEMPERATURE: 98 F | HEART RATE: 97 BPM | SYSTOLIC BLOOD PRESSURE: 167 MMHG | DIASTOLIC BLOOD PRESSURE: 91 MMHG

## 2022-03-27 VITALS
RESPIRATION RATE: 18 BRPM | HEART RATE: 60 BPM | SYSTOLIC BLOOD PRESSURE: 129 MMHG | OXYGEN SATURATION: 100 % | DIASTOLIC BLOOD PRESSURE: 76 MMHG | TEMPERATURE: 98 F

## 2022-03-27 LAB
ALBUMIN SERPL ELPH-MCNC: 4.7 G/DL — SIGNIFICANT CHANGE UP (ref 3.3–5.2)
ALP SERPL-CCNC: 74 U/L — SIGNIFICANT CHANGE UP (ref 40–120)
ALT FLD-CCNC: 29 U/L — SIGNIFICANT CHANGE UP
ANION GAP SERPL CALC-SCNC: 22 MMOL/L — HIGH (ref 5–17)
AST SERPL-CCNC: 20 U/L — SIGNIFICANT CHANGE UP
BASOPHILS # BLD AUTO: 0.05 K/UL — SIGNIFICANT CHANGE UP (ref 0–0.2)
BASOPHILS NFR BLD AUTO: 0.5 % — SIGNIFICANT CHANGE UP (ref 0–2)
BILIRUB SERPL-MCNC: 0.6 MG/DL — SIGNIFICANT CHANGE UP (ref 0.4–2)
BUN SERPL-MCNC: 17.7 MG/DL — SIGNIFICANT CHANGE UP (ref 8–20)
CALCIUM SERPL-MCNC: 10 MG/DL — SIGNIFICANT CHANGE UP (ref 8.6–10.2)
CHLORIDE SERPL-SCNC: 101 MMOL/L — SIGNIFICANT CHANGE UP (ref 98–107)
CO2 SERPL-SCNC: 18 MMOL/L — LOW (ref 22–29)
CREAT SERPL-MCNC: 0.8 MG/DL — SIGNIFICANT CHANGE UP (ref 0.5–1.3)
EGFR: 118 ML/MIN/1.73M2 — SIGNIFICANT CHANGE UP
EOSINOPHIL # BLD AUTO: 0.13 K/UL — SIGNIFICANT CHANGE UP (ref 0–0.5)
EOSINOPHIL NFR BLD AUTO: 1.3 % — SIGNIFICANT CHANGE UP (ref 0–6)
GLUCOSE SERPL-MCNC: 134 MG/DL — HIGH (ref 70–99)
HCT VFR BLD CALC: 46.2 % — SIGNIFICANT CHANGE UP (ref 39–50)
HGB BLD-MCNC: 16.8 G/DL — SIGNIFICANT CHANGE UP (ref 13–17)
IMM GRANULOCYTES NFR BLD AUTO: 0.3 % — SIGNIFICANT CHANGE UP (ref 0–1.5)
LIDOCAIN IGE QN: 25 U/L — SIGNIFICANT CHANGE UP (ref 22–51)
LYMPHOCYTES # BLD AUTO: 1.93 K/UL — SIGNIFICANT CHANGE UP (ref 1–3.3)
LYMPHOCYTES # BLD AUTO: 19.7 % — SIGNIFICANT CHANGE UP (ref 13–44)
MCHC RBC-ENTMCNC: 34.8 PG — HIGH (ref 27–34)
MCHC RBC-ENTMCNC: 36.4 GM/DL — HIGH (ref 32–36)
MCV RBC AUTO: 95.7 FL — SIGNIFICANT CHANGE UP (ref 80–100)
MONOCYTES # BLD AUTO: 0.71 K/UL — SIGNIFICANT CHANGE UP (ref 0–0.9)
MONOCYTES NFR BLD AUTO: 7.2 % — SIGNIFICANT CHANGE UP (ref 2–14)
NEUTROPHILS # BLD AUTO: 6.96 K/UL — SIGNIFICANT CHANGE UP (ref 1.8–7.4)
NEUTROPHILS NFR BLD AUTO: 71 % — SIGNIFICANT CHANGE UP (ref 43–77)
PLATELET # BLD AUTO: 277 K/UL — SIGNIFICANT CHANGE UP (ref 150–400)
POTASSIUM SERPL-MCNC: 3.9 MMOL/L — SIGNIFICANT CHANGE UP (ref 3.5–5.3)
POTASSIUM SERPL-SCNC: 3.9 MMOL/L — SIGNIFICANT CHANGE UP (ref 3.5–5.3)
PROT SERPL-MCNC: 7.4 G/DL — SIGNIFICANT CHANGE UP (ref 6.6–8.7)
RBC # BLD: 4.83 M/UL — SIGNIFICANT CHANGE UP (ref 4.2–5.8)
RBC # FLD: 11.9 % — SIGNIFICANT CHANGE UP (ref 10.3–14.5)
SODIUM SERPL-SCNC: 140 MMOL/L — SIGNIFICANT CHANGE UP (ref 135–145)
WBC # BLD: 9.81 K/UL — SIGNIFICANT CHANGE UP (ref 3.8–10.5)
WBC # FLD AUTO: 9.81 K/UL — SIGNIFICANT CHANGE UP (ref 3.8–10.5)

## 2022-03-27 PROCEDURE — 85025 COMPLETE CBC W/AUTO DIFF WBC: CPT

## 2022-03-27 PROCEDURE — 96365 THER/PROPH/DIAG IV INF INIT: CPT | Mod: XU

## 2022-03-27 PROCEDURE — 96372 THER/PROPH/DIAG INJ SC/IM: CPT

## 2022-03-27 PROCEDURE — 96366 THER/PROPH/DIAG IV INF ADDON: CPT

## 2022-03-27 PROCEDURE — 82962 GLUCOSE BLOOD TEST: CPT

## 2022-03-27 PROCEDURE — 36415 COLL VENOUS BLD VENIPUNCTURE: CPT

## 2022-03-27 PROCEDURE — 96375 TX/PRO/DX INJ NEW DRUG ADDON: CPT

## 2022-03-27 PROCEDURE — 99284 EMERGENCY DEPT VISIT MOD MDM: CPT | Mod: 25

## 2022-03-27 PROCEDURE — 83690 ASSAY OF LIPASE: CPT

## 2022-03-27 PROCEDURE — 80053 COMPREHEN METABOLIC PANEL: CPT

## 2022-03-27 PROCEDURE — 99285 EMERGENCY DEPT VISIT HI MDM: CPT

## 2022-03-27 RX ORDER — ACETAMINOPHEN 500 MG
1000 TABLET ORAL ONCE
Refills: 0 | Status: COMPLETED | OUTPATIENT
Start: 2022-03-27 | End: 2022-03-27

## 2022-03-27 RX ORDER — METOCLOPRAMIDE HCL 10 MG
10 TABLET ORAL ONCE
Refills: 0 | Status: COMPLETED | OUTPATIENT
Start: 2022-03-27 | End: 2022-03-27

## 2022-03-27 RX ORDER — DIPHENHYDRAMINE HCL 50 MG
25 CAPSULE ORAL ONCE
Refills: 0 | Status: COMPLETED | OUTPATIENT
Start: 2022-03-27 | End: 2022-03-27

## 2022-03-27 RX ORDER — SODIUM CHLORIDE 9 MG/ML
1000 INJECTION, SOLUTION INTRAVENOUS ONCE
Refills: 0 | Status: COMPLETED | OUTPATIENT
Start: 2022-03-27 | End: 2022-03-27

## 2022-03-27 RX ORDER — KETOROLAC TROMETHAMINE 30 MG/ML
30 SYRINGE (ML) INJECTION ONCE
Refills: 0 | Status: DISCONTINUED | OUTPATIENT
Start: 2022-03-27 | End: 2022-03-27

## 2022-03-27 RX ORDER — HALOPERIDOL DECANOATE 100 MG/ML
2 INJECTION INTRAMUSCULAR ONCE
Refills: 0 | Status: COMPLETED | OUTPATIENT
Start: 2022-03-27 | End: 2022-03-27

## 2022-03-27 RX ORDER — ONDANSETRON 8 MG/1
4 TABLET, FILM COATED ORAL ONCE
Refills: 0 | Status: COMPLETED | OUTPATIENT
Start: 2022-03-27 | End: 2022-03-27

## 2022-03-27 RX ADMIN — ONDANSETRON 4 MILLIGRAM(S): 8 TABLET, FILM COATED ORAL at 14:19

## 2022-03-27 RX ADMIN — Medication 25 MILLIGRAM(S): at 18:19

## 2022-03-27 RX ADMIN — Medication 30 MILLIGRAM(S): at 16:12

## 2022-03-27 RX ADMIN — SODIUM CHLORIDE 1000 MILLILITER(S): 9 INJECTION, SOLUTION INTRAVENOUS at 15:11

## 2022-03-27 RX ADMIN — SODIUM CHLORIDE 1000 MILLILITER(S): 9 INJECTION, SOLUTION INTRAVENOUS at 14:19

## 2022-03-27 RX ADMIN — Medication 1000 MILLIGRAM(S): at 16:23

## 2022-03-27 RX ADMIN — HALOPERIDOL DECANOATE 2 MILLIGRAM(S): 100 INJECTION INTRAMUSCULAR at 14:20

## 2022-03-27 RX ADMIN — Medication 30 MILLIGRAM(S): at 18:24

## 2022-03-27 RX ADMIN — Medication 20 MILLIGRAM(S): at 17:13

## 2022-03-27 RX ADMIN — Medication 1000 MILLIGRAM(S): at 18:24

## 2022-03-27 RX ADMIN — SODIUM CHLORIDE 1000 MILLILITER(S): 9 INJECTION, SOLUTION INTRAVENOUS at 18:24

## 2022-03-27 RX ADMIN — Medication 400 MILLIGRAM(S): at 15:07

## 2022-03-27 RX ADMIN — Medication 10 MILLIGRAM(S): at 15:08

## 2022-03-27 NOTE — ED ADULT NURSE NOTE - OBJECTIVE STATEMENT
37y male AOx4 c/o nausea/vomiting. pt endorses smoking marijuana prior to onset. PMHX of cyclic vomiting and pancreatitis. pt states he smokes marijuana for anxiety. denies ETOH use. respirations even and unlabored. denies chest discomfort. abd soft and nondistended. skin WNL for race.

## 2022-03-27 NOTE — ED ADULT TRIAGE NOTE - CHIEF COMPLAINT QUOTE
pt c/o cyclical vomiting, pt last smoker marijuana yesterday denies use today. pt complaining or constant n/v. pt dneies other drug or etoh use today.

## 2022-03-27 NOTE — ED PROVIDER NOTE - NSFOLLOWUPINSTRUCTIONS_ED_ALL_ED_FT
Home and rest  clear liquids only tonight - small amounts frequently then bland diet tomorrow  No marijuana, or if you really cant stop, use a lot less

## 2022-03-27 NOTE — ED PROVIDER NOTE - PATIENT PORTAL LINK FT
You can access the FollowMyHealth Patient Portal offered by [facility] by registering at the following website: [email or web address]. By joining [employer]’s FollowMyHealth portal, you will also be able to view your health information using other applications (apps) compatible with our system.

## 2022-03-27 NOTE — ED PROVIDER NOTE - OBJECTIVE STATEMENT
36 yo male vomiting and crying for help  Pt states he has a hx of pancreatitis but has not been drinking  Pt admits to smoking marijuana and developing abd pain and multiple episodes of vomiting  Pt smokes bc he has severe anxiety and it is the only thing that calms him down

## 2022-03-27 NOTE — ED PROVIDER NOTE - CONSTITUTIONAL, MLM
normal... Well appearing, awake, alert, oriented to person, place, time/situation and in  apparent distress.

## 2022-03-27 NOTE — ED PROVIDER NOTE - CLINICAL SUMMARY MEDICAL DECISION MAKING FREE TEXT BOX
pt with cyclic vomiting presents after smoking marijuana   no alcohol   pe as doc  labs iv ivf iv meds reassess

## 2022-05-25 NOTE — ED ADULT NURSE NOTE - COVID-19 RESULT DATE/TIME
From: Salma Hare  To: Joleen Robb  Sent: 5/24/2022 5:42 AM CDT  Subject: Back pain    Hi Dr Lopez, i am having a flair up with my back again. Been doing chiropractor and massage. In the past you have prescribed a short course of steroids to help get the inflammation out. I am wondering if you can do that again.    Thanks    Salma Hare  
Message sent to pt via portal   
Ok for prednisone as requested, script signed agree with holding Mobic  
Per notes 9/18/2019-Hold mobic while on steroid.  Prednisone 10mg 1 po bid x 2 days then 1 po daily x 5 days      This prescription has been pended. Ok to send?  
14-Jun-2021 14:34

## 2022-06-09 ENCOUNTER — EMERGENCY (EMERGENCY)
Facility: HOSPITAL | Age: 37
LOS: 1 days | Discharge: HOME | End: 2022-06-09
Attending: EMERGENCY MEDICINE
Payer: MEDICAID

## 2022-06-09 VITALS
SYSTOLIC BLOOD PRESSURE: 139 MMHG | HEIGHT: 71 IN | RESPIRATION RATE: 18 BRPM | DIASTOLIC BLOOD PRESSURE: 89 MMHG | HEART RATE: 71 BPM | TEMPERATURE: 99 F | OXYGEN SATURATION: 100 %

## 2022-06-09 VITALS — WEIGHT: 180.12 LBS

## 2022-06-09 LAB
BASOPHILS # BLD AUTO: 0.07 K/UL — SIGNIFICANT CHANGE UP (ref 0–0.2)
BASOPHILS NFR BLD AUTO: 0.4 % — SIGNIFICANT CHANGE UP (ref 0–2)
EOSINOPHIL # BLD AUTO: 0.09 K/UL — SIGNIFICANT CHANGE UP (ref 0–0.5)
EOSINOPHIL NFR BLD AUTO: 0.6 % — SIGNIFICANT CHANGE UP (ref 0–6)
HCT VFR BLD CALC: 42.5 % — SIGNIFICANT CHANGE UP (ref 39–50)
HGB BLD-MCNC: 15.7 G/DL — SIGNIFICANT CHANGE UP (ref 13–17)
IMM GRANULOCYTES NFR BLD AUTO: 0.6 % — SIGNIFICANT CHANGE UP (ref 0–1.5)
LYMPHOCYTES # BLD AUTO: 1.68 K/UL — SIGNIFICANT CHANGE UP (ref 1–3.3)
LYMPHOCYTES # BLD AUTO: 10.5 % — LOW (ref 13–44)
MCHC RBC-ENTMCNC: 35.2 PG — HIGH (ref 27–34)
MCHC RBC-ENTMCNC: 36.9 GM/DL — HIGH (ref 32–36)
MCV RBC AUTO: 95.3 FL — SIGNIFICANT CHANGE UP (ref 80–100)
MONOCYTES # BLD AUTO: 0.96 K/UL — HIGH (ref 0–0.9)
MONOCYTES NFR BLD AUTO: 6 % — SIGNIFICANT CHANGE UP (ref 2–14)
NEUTROPHILS # BLD AUTO: 13.04 K/UL — HIGH (ref 1.8–7.4)
NEUTROPHILS NFR BLD AUTO: 81.9 % — HIGH (ref 43–77)
PLATELET # BLD AUTO: 284 K/UL — SIGNIFICANT CHANGE UP (ref 150–400)
RBC # BLD: 4.46 M/UL — SIGNIFICANT CHANGE UP (ref 4.2–5.8)
RBC # FLD: 11.9 % — SIGNIFICANT CHANGE UP (ref 10.3–14.5)
WBC # BLD: 15.93 K/UL — HIGH (ref 3.8–10.5)
WBC # FLD AUTO: 15.93 K/UL — HIGH (ref 3.8–10.5)

## 2022-06-09 PROCEDURE — 99284 EMERGENCY DEPT VISIT MOD MDM: CPT

## 2022-06-09 RX ORDER — METOCLOPRAMIDE HCL 10 MG
10 TABLET ORAL ONCE
Refills: 0 | Status: COMPLETED | OUTPATIENT
Start: 2022-06-09 | End: 2022-06-09

## 2022-06-09 RX ORDER — ACETAMINOPHEN 500 MG
1000 TABLET ORAL ONCE
Refills: 0 | Status: DISCONTINUED | OUTPATIENT
Start: 2022-06-09 | End: 2022-06-09

## 2022-06-09 RX ORDER — SODIUM CHLORIDE 9 MG/ML
1000 INJECTION, SOLUTION INTRAVENOUS ONCE
Refills: 0 | Status: COMPLETED | OUTPATIENT
Start: 2022-06-09 | End: 2022-06-09

## 2022-06-09 RX ORDER — HYDROMORPHONE HYDROCHLORIDE 2 MG/ML
1 INJECTION INTRAMUSCULAR; INTRAVENOUS; SUBCUTANEOUS ONCE
Refills: 0 | Status: DISCONTINUED | OUTPATIENT
Start: 2022-06-09 | End: 2022-06-09

## 2022-06-09 RX ORDER — HALOPERIDOL DECANOATE 100 MG/ML
5 INJECTION INTRAMUSCULAR ONCE
Refills: 0 | Status: COMPLETED | OUTPATIENT
Start: 2022-06-09 | End: 2022-06-09

## 2022-06-09 RX ORDER — DIPHENHYDRAMINE HCL 50 MG
50 CAPSULE ORAL ONCE
Refills: 0 | Status: COMPLETED | OUTPATIENT
Start: 2022-06-09 | End: 2022-06-09

## 2022-06-09 RX ORDER — ONDANSETRON 8 MG/1
8 TABLET, FILM COATED ORAL ONCE
Refills: 0 | Status: COMPLETED | OUTPATIENT
Start: 2022-06-09 | End: 2022-06-09

## 2022-06-09 RX ORDER — KETOROLAC TROMETHAMINE 30 MG/ML
15 SYRINGE (ML) INJECTION ONCE
Refills: 0 | Status: DISCONTINUED | OUTPATIENT
Start: 2022-06-09 | End: 2022-06-09

## 2022-06-09 RX ORDER — ACETAMINOPHEN 500 MG
975 TABLET ORAL ONCE
Refills: 0 | Status: COMPLETED | OUTPATIENT
Start: 2022-06-09 | End: 2022-06-09

## 2022-06-09 RX ADMIN — HALOPERIDOL DECANOATE 5 MILLIGRAM(S): 100 INJECTION INTRAMUSCULAR at 23:27

## 2022-06-09 RX ADMIN — SODIUM CHLORIDE 1000 MILLILITER(S): 9 INJECTION, SOLUTION INTRAVENOUS at 23:27

## 2022-06-09 RX ADMIN — ONDANSETRON 8 MILLIGRAM(S): 8 TABLET, FILM COATED ORAL at 23:27

## 2022-06-09 RX ADMIN — Medication 15 MILLIGRAM(S): at 23:27

## 2022-06-09 NOTE — ED ADULT NURSE NOTE - OBJECTIVE STATEMENT
Aox4 c/o of abd pain, NV.  pt states that he drinks alcohol and smokes marijuana regularly.  pt states that he is in "excruciating belly pain" for the "pas few hours"  pt resting comfortably in bed, no acute distress noted at this time safety maintained. Pt encouraged to report any needs or changes to staff.

## 2022-06-10 LAB
ALBUMIN SERPL ELPH-MCNC: 4.9 G/DL — SIGNIFICANT CHANGE UP (ref 3.3–5.2)
ALP SERPL-CCNC: 80 U/L — SIGNIFICANT CHANGE UP (ref 40–120)
ALT FLD-CCNC: 17 U/L — SIGNIFICANT CHANGE UP
ANION GAP SERPL CALC-SCNC: 22 MMOL/L — HIGH (ref 5–17)
AST SERPL-CCNC: 19 U/L — SIGNIFICANT CHANGE UP
BILIRUB SERPL-MCNC: 0.6 MG/DL — SIGNIFICANT CHANGE UP (ref 0.4–2)
BUN SERPL-MCNC: 19.5 MG/DL — SIGNIFICANT CHANGE UP (ref 8–20)
CALCIUM SERPL-MCNC: 9.8 MG/DL — SIGNIFICANT CHANGE UP (ref 8.6–10.2)
CHLORIDE SERPL-SCNC: 98 MMOL/L — SIGNIFICANT CHANGE UP (ref 98–107)
CO2 SERPL-SCNC: 19 MMOL/L — LOW (ref 22–29)
CREAT SERPL-MCNC: 1.01 MG/DL — SIGNIFICANT CHANGE UP (ref 0.5–1.3)
EGFR: 99 ML/MIN/1.73M2 — SIGNIFICANT CHANGE UP
ETHANOL SERPL-MCNC: <10 MG/DL — SIGNIFICANT CHANGE UP (ref 0–9)
GLUCOSE SERPL-MCNC: 194 MG/DL — HIGH (ref 70–99)
LIDOCAIN IGE QN: 19 U/L — LOW (ref 22–51)
POTASSIUM SERPL-MCNC: 3.3 MMOL/L — LOW (ref 3.5–5.3)
POTASSIUM SERPL-SCNC: 3.3 MMOL/L — LOW (ref 3.5–5.3)
PROT SERPL-MCNC: 7.1 G/DL — SIGNIFICANT CHANGE UP (ref 6.6–8.7)
SODIUM SERPL-SCNC: 139 MMOL/L — SIGNIFICANT CHANGE UP (ref 135–145)

## 2022-06-10 PROCEDURE — 96375 TX/PRO/DX INJ NEW DRUG ADDON: CPT

## 2022-06-10 PROCEDURE — 85025 COMPLETE CBC W/AUTO DIFF WBC: CPT

## 2022-06-10 PROCEDURE — 80307 DRUG TEST PRSMV CHEM ANLYZR: CPT

## 2022-06-10 PROCEDURE — 36415 COLL VENOUS BLD VENIPUNCTURE: CPT

## 2022-06-10 PROCEDURE — 96374 THER/PROPH/DIAG INJ IV PUSH: CPT

## 2022-06-10 PROCEDURE — 83690 ASSAY OF LIPASE: CPT

## 2022-06-10 PROCEDURE — 80053 COMPREHEN METABOLIC PANEL: CPT

## 2022-06-10 PROCEDURE — 99284 EMERGENCY DEPT VISIT MOD MDM: CPT | Mod: 25

## 2022-06-10 RX ORDER — METOCLOPRAMIDE HCL 10 MG
1 TABLET ORAL
Qty: 30 | Refills: 0
Start: 2022-06-10 | End: 2022-06-19

## 2022-06-10 RX ORDER — ONDANSETRON 8 MG/1
1 TABLET, FILM COATED ORAL
Qty: 30 | Refills: 0
Start: 2022-06-10 | End: 2022-06-19

## 2022-06-10 RX ADMIN — Medication 10 MILLIGRAM(S): at 00:03

## 2022-06-10 RX ADMIN — HYDROMORPHONE HYDROCHLORIDE 1 MILLIGRAM(S): 2 INJECTION INTRAMUSCULAR; INTRAVENOUS; SUBCUTANEOUS at 00:33

## 2022-06-10 RX ADMIN — Medication 50 MILLIGRAM(S): at 00:03

## 2022-06-10 RX ADMIN — Medication 975 MILLIGRAM(S): at 00:03

## 2022-06-10 NOTE — ED CDU PROVIDER INITIAL DAY NOTE - CROS ED GI ALL NEG
Patient here for breast RFID Tag placement. IN reviewed the health history, allergies and medications. Family member,  drove her. Radiologist reviews procedure with patient, consent signed. Patient tolerates procedure well. Compression held. Site cleansed with chloraprep, steri strips and dry dressing applied. Ice pack in place. Reviewed discharge instructions with patient and signed copy. Patient verbalized understanding and agreed to contact Nurse Navigator with any questions. Patient A&Ox3, steady on feet and discharged home.
- - -

## 2022-06-10 NOTE — ED PROVIDER NOTE - PATIENT PORTAL LINK FT
You can access the FollowMyHealth Patient Portal offered by [facility] by registering at the following website: [email or web address]. By joining [employer]’s FollowMyHealth portal, you will also be able to view your health information using other applications (apps) compatible with our system.

## 2022-06-10 NOTE — ED PROVIDER NOTE - OBJECTIVE STATEMENT
37 year old male with PMH cyclical vomiting and pancreatitis presents with abd pain. Pt states he drank today and smoked marijuana and developed epigastric abd pain and vomiting immediately after. No diarrhea, chest pain, SOB, fever, chills, hematemesis, melena.

## 2022-06-10 NOTE — ED PROVIDER NOTE - ABDOMINAL EXAM
Hospital Medicine Daily Progress Note    Date of Service  10/14/2019    Chief Complaint  60 y.o. female admitted 10/13/2019 with Shortness of breath    Hospital Course    per HPI, 60 y.o. Female with a complicated pulmonary history who is being worked up as an outpatient for possible interstitial lung disease versus hypersensitivity pneumonitis who presented 10/13/2019 with worsening shortness of breath and cough.     Patient first started having symptoms of dyspnea with exertion about 1 year ago.  She was hospitalized in April for pneumonia and had a bronchoscopy at that time which showed bloody fluid however cultures were negative.  She was treated with steroids and antibiotics and was discharged.  She has been seen and followed by pulmonology since that time.  She has also had an outpatient barium swallow which revealed mild silent aspiration therefore she has been started on a PPI.  She is scheduled to get PFTs next week.  She had a high-resolution CT scan done on 9/27 which reveals bilateral upper lobe groundglass opacities.  She has taken antibiotics recently starting on 9/20 through 9/27 and did not have significant improvement of her symptoms.  At that time, she was started on 2 L of oxygen which she has been using for the last 2-1/2 weeks.  Her symptoms have continued to worsen and she notes ongoing dyspnea with exertion.  She has occasional sputum production of a yellowish-white sputum.  She also notes wheezing.  She has felt hot and cold flashes however has not measured a fever at home.  She denies any weight loss, no chest pain or palpitations.  No focal weakness.  No recent travel.     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     ER course: Chest x-ray was clear however she is hypoxic requiring 4 L.  WBC elevated at 19, heart rate was 104.  She will be admitted for IV antibiotic therapy.  I will discuss with  pulmonology for possible bronchoscopy versus other inpatient work-up due to her refractory symptoms      Interval Problem Update  Patient says overall patient's breathing has improved compared to yesterday, however she is still little anxious because she wants to get a definitive diagnosis, I did tell her that that made be of a process and we may not have specific answers.  Nevertheless we will continue with IV antibiotics, IV steroids, pulmonary has been consulted awaiting for further recommendations per    Consultants/Specialty  Pulmonary    Code Status  Full Code    Disposition  TBD    Review of Systems  Review of Systems   Constitutional: Positive for malaise/fatigue. Negative for chills and fever.   HENT: Negative for congestion, hearing loss, sore throat and tinnitus.    Eyes: Negative for blurred vision, double vision, photophobia and pain.   Respiratory: Positive for shortness of breath. Negative for cough, hemoptysis, sputum production and stridor.    Cardiovascular: Negative for chest pain, palpitations, orthopnea, claudication and PND.   Gastrointestinal: Negative for blood in stool, constipation, heartburn, melena, nausea and vomiting.   Genitourinary: Negative for dysuria, frequency and urgency.   Musculoskeletal: Negative for back pain, myalgias and neck pain.   Neurological: Negative for dizziness, tingling, tremors, sensory change, speech change, weakness and headaches.   Psychiatric/Behavioral: Negative for depression, memory loss and suicidal ideas. The patient is nervous/anxious.    All other systems reviewed and are negative.       Physical Exam  Temp:  [36.4 °C (97.6 °F)-37.9 °C (100.2 °F)] 36.4 °C (97.6 °F)  Pulse:  [] 87  Resp:  [16-28] 20  BP: (105-137)/(54-84) 134/66  SpO2:  [91 %-98 %] 94 %    Physical Exam   Constitutional: She is oriented to person, place, and time. She appears well-developed and well-nourished. No distress.   HENT:   Head: Normocephalic and atraumatic.    Mouth/Throat: No oropharyngeal exudate.   Eyes: Pupils are equal, round, and reactive to light. Conjunctivae are normal. Right eye exhibits no discharge. No scleral icterus.   Neck: Neck supple. No JVD present. No thyromegaly present.   Cardiovascular: Normal rate and intact distal pulses.   No murmur heard.  Pulmonary/Chest: Effort normal and breath sounds normal. No stridor. No respiratory distress. She has no wheezes. She has no rales.   Good breath sounds bilateral lung bases   Abdominal: Soft. Bowel sounds are normal. She exhibits no distension. There is no tenderness. There is no rebound.   Musculoskeletal: Normal range of motion. She exhibits no edema.   Neurological: She is alert and oriented to person, place, and time. No cranial nerve deficit.   Skin: Skin is warm. She is not diaphoretic. No erythema.   Psychiatric: She has a normal mood and affect. Her behavior is normal. Thought content normal.   Mildly anxious   Nursing note and vitals reviewed.      Fluids  No intake or output data in the 24 hours ending 10/14/19 0814    Laboratory  Recent Labs     10/13/19  1334 10/14/19  0620   WBC 19.0* 12.1*   RBC 5.75* 4.95   HEMOGLOBIN 17.1* 14.5   HEMATOCRIT 50.7* 44.8   MCV 88.2 90.5   MCH 29.7 29.3   MCHC 33.7 32.4*   RDW 47.3 49.0   PLATELETCT 264 224   MPV 9.2 9.2     Recent Labs     10/13/19  1334 10/14/19  0620   SODIUM 140 140   POTASSIUM 3.9 4.1   CHLORIDE 105 105   CO2 20 22   GLUCOSE 116* 187*   BUN 11 9   CREATININE 0.59 0.60   CALCIUM 9.3 8.9     Recent Labs     10/13/19  1334   INR 0.93         Recent Labs     10/14/19  0620   TRIGLYCERIDE 60   HDL 80   LDL 65       Imaging  DX-CHEST-PORTABLE (1 VIEW)   Final Result         No acute cardiac or pulmonary abnormality is identified.           Assessment/Plan  * Acute on chronic respiratory failure (HCC)- (present on admission)  Assessment & Plan  Acute on chronic, was on 2L at home, now needing 4L  Recent CT done on 9/27 shows groundglass opacities  in the upper lobes possibly consistent with hypersensitivity pneumonitis  New onset of dyspnea diagnosed 1 year ago.  Has had rheumatology markers , which have been neg, only thing not ordered was ANCA , although eosinophil levels are 0  scheduled to get PFTs in 1 week.  Continue with IV Cefepime  Continue with IV solumedrol  Pro-calcitonin 0.21. Repeat in 48 hrs.  Continue RT protocol, duo nebs, Pep therapy if warranted, and incentive spirometry.   Pulmonary consulted     CAP (community acquired pneumonia)  Assessment & Plan  Failed outpatient antibiotics with Augmentin which she finished 2 weeks ago.  As above, difficult to discern inflammatory vs infectious?  Resume IV Cefepime for now.   Cultures pending      Moderate persistent asthma- (present on admission)  Assessment & Plan  Continue RT protocol, duo nebs, Pep therapy if warranted, and incentive spirometry.   PFTs in one week    Sepsis (HCC)  Assessment & Plan  This is Sepsis Present on admission  SIRS criteria identified on my evaluation include: Tachycardia, with heart rate greater than 90 BPM and Leukocyosis, with WBC greater than 12,000  Source is Pulmonary  Sepsis protocol initiated  Fluid resuscitation ordered per protocol  IV Cefepime, sputum, blood cultures pending.   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  Awaiting for recs from pulmonary.  As above IV steroids, PPI for possible reflux, RT protocol      Mixed hyperlipidemia- (present on  admission)  Assessment & Plan  Not on home statin  Pending lipid panel     Essential hypertension- (present on admission)  Assessment & Plan  Controlled  Continue with home dose of lisinopril      The total time spent face to face with this patient was about 35 mins of which 60% of time was spent on counseling, review of records including pertinent lab data and studies, as well as discussing diagnostic evaluation and work up, planned therapeutic interventions and future disposition of care. Where indicated, the assessment and plan reflect discussion of patient with consultants, other healthcare providers, family members, and additional research needed to obtain further information in formulating the plan of care of this patient.          VTE prophylaxis: Lovenox       soft/tender.../nondistended

## 2022-06-15 ENCOUNTER — APPOINTMENT (OUTPATIENT)
Dept: INTERNAL MEDICINE | Facility: CLINIC | Age: 37
End: 2022-06-15

## 2022-06-15 ENCOUNTER — NON-APPOINTMENT (OUTPATIENT)
Age: 37
End: 2022-06-15

## 2022-06-15 DIAGNOSIS — Z72.89 OTHER PROBLEMS RELATED TO LIFESTYLE: ICD-10-CM

## 2022-06-15 DIAGNOSIS — K85.90 ACUTE PANCREATITIS WITHOUT NECROSIS OR INFECTION, UNSPECIFIED: ICD-10-CM

## 2022-06-15 DIAGNOSIS — K21.9 GASTRO-ESOPHAGEAL REFLUX DISEASE W/OUT ESOPHAGITIS: ICD-10-CM

## 2022-06-15 DIAGNOSIS — Z00.00 ENCOUNTER FOR GENERAL ADULT MEDICAL EXAMINATION W/OUT ABNORMAL FINDINGS: ICD-10-CM

## 2022-06-15 NOTE — HISTORY OF PRESENT ILLNESS
[FreeTextEntry1] : annual well check\par establish care [de-identified] : -PMH: EtOH abuse, h/o Pancreatitis\par -SH: . 2 children. Non-smoker. Occasional EtOH use. \par \par SHAUNNA is a 37 year M whom is here today for an annual well check and to establish care w/ a new PMD\par Today, pt reports feeling well and is w/o complaints. \par He denies any changes since our last f/u visit\par \par Specialists Involved:\par -Prior PMD:\par \par -Vaccines: Needs PPSV 23, Flu, Shingles, TDap\par -Colonoscopy:\par -FH of Prostate, Colon, breast or ovarian CA: None known\par

## 2022-06-15 NOTE — ASSESSMENT
[FreeTextEntry1] : \par \par \par \par \par -F/u labs drawn in office today\par -Further recs pending lab results\par -RTO yearly for CPE or sooner if needed

## 2022-06-24 ENCOUNTER — EMERGENCY (EMERGENCY)
Facility: HOSPITAL | Age: 37
LOS: 1 days | Discharge: HOME | End: 2022-06-24
Attending: STUDENT IN AN ORGANIZED HEALTH CARE EDUCATION/TRAINING PROGRAM
Payer: MEDICAID

## 2022-06-24 VITALS
OXYGEN SATURATION: 98 % | SYSTOLIC BLOOD PRESSURE: 132 MMHG | DIASTOLIC BLOOD PRESSURE: 65 MMHG | TEMPERATURE: 98 F | HEART RATE: 71 BPM | RESPIRATION RATE: 16 BRPM

## 2022-06-24 VITALS
WEIGHT: 164.91 LBS | DIASTOLIC BLOOD PRESSURE: 87 MMHG | SYSTOLIC BLOOD PRESSURE: 141 MMHG | HEART RATE: 74 BPM | RESPIRATION RATE: 20 BRPM | HEIGHT: 71 IN | OXYGEN SATURATION: 100 % | TEMPERATURE: 98 F

## 2022-06-24 LAB
ALBUMIN SERPL ELPH-MCNC: 5.1 G/DL — SIGNIFICANT CHANGE UP (ref 3.3–5.2)
ALP SERPL-CCNC: 85 U/L — SIGNIFICANT CHANGE UP (ref 40–120)
ALT FLD-CCNC: 20 U/L — SIGNIFICANT CHANGE UP
ANION GAP SERPL CALC-SCNC: 22 MMOL/L — HIGH (ref 5–17)
AST SERPL-CCNC: 22 U/L — SIGNIFICANT CHANGE UP
BASOPHILS # BLD AUTO: 0.04 K/UL — SIGNIFICANT CHANGE UP (ref 0–0.2)
BASOPHILS NFR BLD AUTO: 0.3 % — SIGNIFICANT CHANGE UP (ref 0–2)
BILIRUB SERPL-MCNC: 0.3 MG/DL — LOW (ref 0.4–2)
BUN SERPL-MCNC: 12.3 MG/DL — SIGNIFICANT CHANGE UP (ref 8–20)
CALCIUM SERPL-MCNC: 9.8 MG/DL — SIGNIFICANT CHANGE UP (ref 8.6–10.2)
CHLORIDE SERPL-SCNC: 105 MMOL/L — SIGNIFICANT CHANGE UP (ref 98–107)
CO2 SERPL-SCNC: 19 MMOL/L — LOW (ref 22–29)
CREAT SERPL-MCNC: 0.84 MG/DL — SIGNIFICANT CHANGE UP (ref 0.5–1.3)
EGFR: 116 ML/MIN/1.73M2 — SIGNIFICANT CHANGE UP
EOSINOPHIL # BLD AUTO: 0.01 K/UL — SIGNIFICANT CHANGE UP (ref 0–0.5)
EOSINOPHIL NFR BLD AUTO: 0.1 % — SIGNIFICANT CHANGE UP (ref 0–6)
GLUCOSE SERPL-MCNC: 142 MG/DL — HIGH (ref 70–99)
HCT VFR BLD CALC: 45.2 % — SIGNIFICANT CHANGE UP (ref 39–50)
HGB BLD-MCNC: 16.7 G/DL — SIGNIFICANT CHANGE UP (ref 13–17)
IMM GRANULOCYTES NFR BLD AUTO: 0.5 % — SIGNIFICANT CHANGE UP (ref 0–1.5)
LIDOCAIN IGE QN: 26 U/L — SIGNIFICANT CHANGE UP (ref 22–51)
LYMPHOCYTES # BLD AUTO: 1.42 K/UL — SIGNIFICANT CHANGE UP (ref 1–3.3)
LYMPHOCYTES # BLD AUTO: 10.7 % — LOW (ref 13–44)
MCHC RBC-ENTMCNC: 35.4 PG — HIGH (ref 27–34)
MCHC RBC-ENTMCNC: 36.9 GM/DL — HIGH (ref 32–36)
MCV RBC AUTO: 95.8 FL — SIGNIFICANT CHANGE UP (ref 80–100)
MONOCYTES # BLD AUTO: 0.27 K/UL — SIGNIFICANT CHANGE UP (ref 0–0.9)
MONOCYTES NFR BLD AUTO: 2 % — SIGNIFICANT CHANGE UP (ref 2–14)
NEUTROPHILS # BLD AUTO: 11.49 K/UL — HIGH (ref 1.8–7.4)
NEUTROPHILS NFR BLD AUTO: 86.4 % — HIGH (ref 43–77)
PLATELET # BLD AUTO: 317 K/UL — SIGNIFICANT CHANGE UP (ref 150–400)
POTASSIUM SERPL-MCNC: 3.9 MMOL/L — SIGNIFICANT CHANGE UP (ref 3.5–5.3)
POTASSIUM SERPL-SCNC: 3.9 MMOL/L — SIGNIFICANT CHANGE UP (ref 3.5–5.3)
PROT SERPL-MCNC: 7.8 G/DL — SIGNIFICANT CHANGE UP (ref 6.6–8.7)
RBC # BLD: 4.72 M/UL — SIGNIFICANT CHANGE UP (ref 4.2–5.8)
RBC # FLD: 11.8 % — SIGNIFICANT CHANGE UP (ref 10.3–14.5)
SODIUM SERPL-SCNC: 146 MMOL/L — HIGH (ref 135–145)
WBC # BLD: 13.29 K/UL — HIGH (ref 3.8–10.5)
WBC # FLD AUTO: 13.29 K/UL — HIGH (ref 3.8–10.5)

## 2022-06-24 PROCEDURE — 36415 COLL VENOUS BLD VENIPUNCTURE: CPT

## 2022-06-24 PROCEDURE — 80053 COMPREHEN METABOLIC PANEL: CPT

## 2022-06-24 PROCEDURE — 96375 TX/PRO/DX INJ NEW DRUG ADDON: CPT

## 2022-06-24 PROCEDURE — 85025 COMPLETE CBC W/AUTO DIFF WBC: CPT

## 2022-06-24 PROCEDURE — 83690 ASSAY OF LIPASE: CPT

## 2022-06-24 PROCEDURE — 99284 EMERGENCY DEPT VISIT MOD MDM: CPT

## 2022-06-24 PROCEDURE — 99284 EMERGENCY DEPT VISIT MOD MDM: CPT | Mod: 25

## 2022-06-24 PROCEDURE — 96374 THER/PROPH/DIAG INJ IV PUSH: CPT

## 2022-06-24 RX ORDER — ONDANSETRON 8 MG/1
1 TABLET, FILM COATED ORAL
Qty: 12 | Refills: 0
Start: 2022-06-24 | End: 2022-06-26

## 2022-06-24 RX ORDER — DIAZEPAM 5 MG
2.5 TABLET ORAL ONCE
Refills: 0 | Status: DISCONTINUED | OUTPATIENT
Start: 2022-06-24 | End: 2022-06-24

## 2022-06-24 RX ORDER — HALOPERIDOL DECANOATE 100 MG/ML
5 INJECTION INTRAMUSCULAR ONCE
Refills: 0 | Status: COMPLETED | OUTPATIENT
Start: 2022-06-24 | End: 2022-06-24

## 2022-06-24 RX ORDER — SODIUM CHLORIDE 9 MG/ML
2000 INJECTION, SOLUTION INTRAVENOUS ONCE
Refills: 0 | Status: COMPLETED | OUTPATIENT
Start: 2022-06-24 | End: 2022-06-24

## 2022-06-24 RX ORDER — ONDANSETRON 8 MG/1
4 TABLET, FILM COATED ORAL ONCE
Refills: 0 | Status: COMPLETED | OUTPATIENT
Start: 2022-06-24 | End: 2022-06-24

## 2022-06-24 RX ADMIN — ONDANSETRON 4 MILLIGRAM(S): 8 TABLET, FILM COATED ORAL at 10:08

## 2022-06-24 RX ADMIN — HALOPERIDOL DECANOATE 5 MILLIGRAM(S): 100 INJECTION INTRAMUSCULAR at 08:21

## 2022-06-24 RX ADMIN — Medication 2.5 MILLIGRAM(S): at 09:15

## 2022-06-24 RX ADMIN — SODIUM CHLORIDE 2000 MILLILITER(S): 9 INJECTION, SOLUTION INTRAVENOUS at 08:18

## 2022-06-24 RX ADMIN — SODIUM CHLORIDE 2000 MILLILITER(S): 9 INJECTION, SOLUTION INTRAVENOUS at 09:03

## 2022-06-24 RX ADMIN — Medication 25 MILLIGRAM(S): at 10:07

## 2022-06-24 NOTE — ED PROVIDER NOTE - NSFOLLOWUPINSTRUCTIONS_ED_ALL_ED_FT
Take antinausea medicine as needed.    Cyclic Vomiting Syndrome, Adult      Cyclic vomiting syndrome (CVS) is a condition that causes episodes of severe nausea and vomiting. It can last for hours or even days. Attacks may occur several times a month or several times a year. Between episodes of CVS, you may be otherwise healthy.      What are the causes?    The cause of this condition is not known. Although many of the episodes can happen for no obvious reason, you may have specific CVS triggers. Episodes may be triggered by:  •An infection, especially colds and the flu.      •Emotional stress, including excitement or anxiety about upcoming events, such as school, parties, or travel.      •Certain foods or beverages, such as chocolate, cheese, alcohol, and food additives.      •Motion sickness.      •Eating a large meal before bed.      •Being very tired.      •Being too hot.        What increases the risk?    You are more likely to develop this condition if:  •You get migraine headaches.      •You have a family history of CVS or migraine headaches.        What are the signs or symptoms?    Symptoms tend to happen at the same time of day, and each episode tends to last about the same amount of time. Symptoms commonly start at night or when you wake up. Many people have warning signs (prodrome) before an episode, which may include slight nausea, sweating, and pale skin (pallor).    The most common symptoms of a CVS attack include:  •Severe vomiting. Vomiting may happen every 5–15 minutes.      •Severe nausea.      •Gagging (retching).      Other symptoms may include:  •Headache.      •Dizziness.      •Sensitivity to light.      •Extreme thirst.      •Abdominal pain. This can be severe.      •Loose stools or diarrhea.      •Fever.      •Pale skin (pallor), especially on the face.      •Weakness.      •Exhaustion.      •Sleepiness after a CVS episode.    •Dehydration. This can cause:  •Thirst.      •Dry mouth.      •Decreased urination.      •Fatigue.          How is this diagnosed?    This condition may be diagnosed based on your symptoms, medical history, and family history of CVS or migraine. Your health care provider will ask whether you have had:  •Episodes of severe nausea and vomiting that have happened a total of 5 or more times, or 3 or more times in the past 6 months.      •Episodes that last for 1 hour or more, and occur 1 week apart or farther apart.      •Episodes that are similar each time.      •Normal health between episodes.      Your health care provider will also do a physical exam. To rule out other conditions, you may have tests, such as:  •Blood tests.      •Urine tests.      •Imaging tests.        How is this treated?  A prescription pill bottle with an example of a pill.   There is no cure for this condition, but treatment can help manage or prevent CVS episodes. Work with your health care provider to find the best treatment for you. Treatment may include:  •Avoiding stress and CVS triggers.      •Eating smaller, more frequent meals.    •Taking medicines, such as:  •Over-the-counter pain medicine.      •Anti-nausea medicines.      •Antacids.      •Antihistamines.      •Medicines for migraines.      •Antidepressants.      •Antibiotics.        Severe nausea and vomiting may require you to stay at the hospital. You may need IV fluids to prevent or treat dehydration.      Follow these instructions at home:    During an episode     •Take over-the-counter and prescription medicines only as told by your health care provider.      •Stay in bed and rest in a dark, quiet room.        After an episode   Illustration of a person drinking a glass of water.   •Drink an oral rehydration solution (ORS), if directed by your health care provider. This is a drink that helps you replace fluids and the salts and minerals in your blood (electrolytes). It can be found at pharmacies and retail stores.    •Drink small amounts of clear fluids slowly and gradually add more.   •Drink clear fluids such as water or fruit juice that has water added (is diluted). You may also eat low-calorie popsicles.      •Avoid drinking fluids that contain a lot of sugar or caffeine, such as sports drinks and soda.        •Eat soft foods in small amounts every 3–4 hours. Eat your regular diet, but avoid spicy or fatty foods, such as french fries and pizza.      General instructions     •Monitor your condition for any changes.      •If you were prescribed an antibiotic medicine, take it as told by your health care provider. Do not stop taking the antibiotic even if you start to feel better.      •Keep track of your attacks and symptoms, and pay attention to any triggers. Avoid those triggers when you can.      •Keep all follow-up visits as told by your health care provider. This is important.        Contact a health care provider if:    •Your condition gets worse.      •You cannot drink fluids without vomiting.      •You have pain and trouble swallowing after an episode.        Get help right away if:    •You have blood in your vomit.      •Your vomit looks like coffee grounds.      •You have stools that are bloody or black, or stools that look like tar.    •You have signs of dehydration, such as:  •Sunken eyes.      •Not making tears while crying.      •Very dry mouth.      •Cracked lips.      •Decreased urine production.      •Dark urine. Urine may be the color of tea.      •Weakness.      •Sleepiness.          Summary    •Cyclic vomiting syndrome (CVS) causes episodes of severe nausea and vomiting that can last for hours or even days.      •Treatment can help you manage or prevent CVS episodes. Work with your health care provider to find the best treatment for you.      •Vomiting and diarrhea can make you feel weak and can lead to dehydration. If you notice signs of dehydration, call your health care provider right away.      •Keep all follow-up visits as told by your health care provider. This is important.      This information is not intended to replace advice given to you by your health care provider. Make sure you discuss any questions you have with your health care provider.

## 2022-06-24 NOTE — ED PROVIDER NOTE - CLINICAL SUMMARY MEDICAL DECISION MAKING FREE TEXT BOX
Patient with a history of cyclic vomiting presenting with symptoms he says is the same as his typical symptoms. Will get labs, ua, uc, ekg, treat with haldol and reassess.

## 2022-06-24 NOTE — ED PROVIDER NOTE - PATIENT PORTAL LINK FT
You can access the FollowMyHealth Patient Portal offered by [facility] by registering at the following website: [email or web address]. By joining [employer]’s FollowMyHealth portal, you will also be able to view your health information using other applications (apps) compatible with our system.

## 2022-06-24 NOTE — ED PROVIDER NOTE - OBJECTIVE STATEMENT
37 y m with pmh of pancreatitis and cyclic vomiting syndrome presenting for symptoms of abdominal pain n/v for the last 4 hours. Pain is diffuse, non radiating, no previous surgeries. Feels like his previous episodes of cyclic vomiting syndrome. Has not stopped smoking weed though he has been told it will continue to exacerbate his symptoms. Denies f/c, cp, sob, ha.

## 2022-06-24 NOTE — ED ADULT NURSE NOTE - OBJECTIVE STATEMENT
pt reports nausea, vomiting, abdominal pain starting 4 hours pta. a and o x3. breathing even and unlabored. pt is screaming, crying and throwing himself on ground. pt explained why his behavior is inappropriate and why it is disruptive to others as well as potentially harmful to himself. pt redirected into bed safely and deescalated verbally. will continue to monitor.

## 2022-06-24 NOTE — ED PROVIDER NOTE - PHYSICAL EXAMINATION
General: well appearing, NAD  Head: NC, AT  EENT: EOMI, no scleral icterus  Cardiac: RRR, no apparent murmurs, no lower extremity edema  Respiratory: CTABL, no respiratory distress   Abdomen: soft, ND, diffusely tender, nonperitonitic  MSK/Vascular: full ROM, soft compartments, warm extremities  Neuro: AAOx3, sensation to light touch intact  Psych: calm, cooperative

## 2022-06-24 NOTE — ED PROVIDER NOTE - ATTENDING CONTRIBUTION TO CARE
Pt with a 4 h hx of n/v and upper abd pain. pt here frequently for similar symptoms.  Pt has a hx of alcohol abuse and Cannabinoid hyperemesis syndrome.    physical - rrr. ctab. abd- soft, mild diffuse ttp. no rebound. no guarding. no edema. no rash.    plan - labs reviewed. pt feeling better after haldol, antiemetics, ivf, valium.  Pt symptoms c/w prior exacerbations of his cannabinoid hyperemesis. Pt instructed to f/up with pcp.

## 2022-06-30 NOTE — ED ADULT TRIAGE NOTE - NS ED NURSE BANDS TYPE
"-- DO NOT REPLY / DO NOT REPLY ALL --  -- Message is from the [address] [place] [address] if Available for the Visit Type? No    Pediatric Specialty Appointment Request    Name: Nereyda Xie  : 2020  MRN: [id number]    Mai Vazquez Information       Type Contact Phone/Fax    2022 11:40 AM CDT Phone (Incoming) Jalbum [phone]     dad          Appointment requested with:  Hong Schmid     Specialty:  Peds Dermatology    Reason for appointment:  ECZEMA    Referred by: Taurus Hatfield MD    Insurance verified:  Yes    Patient scheduling preference:  [address]     Patient requests callback: Yes   Callback phone number: [phone] PLEASE LEAVE A MESSAGE       Turnaround time given to caller: ""This message will be sent to [facility] Provider's name]. The clinical team will fulfill your request as soon as they review your message. \""  " Name band;

## 2022-07-27 ENCOUNTER — EMERGENCY (EMERGENCY)
Facility: HOSPITAL | Age: 37
LOS: 1 days | Discharge: HOME | End: 2022-07-27
Attending: EMERGENCY MEDICINE
Payer: MEDICAID

## 2022-07-27 VITALS
DIASTOLIC BLOOD PRESSURE: 88 MMHG | WEIGHT: 160.06 LBS | TEMPERATURE: 98 F | RESPIRATION RATE: 16 BRPM | SYSTOLIC BLOOD PRESSURE: 155 MMHG | HEIGHT: 71 IN | HEART RATE: 78 BPM | OXYGEN SATURATION: 97 %

## 2022-07-27 VITALS
HEART RATE: 79 BPM | DIASTOLIC BLOOD PRESSURE: 61 MMHG | SYSTOLIC BLOOD PRESSURE: 103 MMHG | OXYGEN SATURATION: 98 % | RESPIRATION RATE: 18 BRPM | TEMPERATURE: 98 F

## 2022-07-27 LAB
ALBUMIN SERPL ELPH-MCNC: 4.2 G/DL — SIGNIFICANT CHANGE UP (ref 3.3–5.2)
ALP SERPL-CCNC: 68 U/L — SIGNIFICANT CHANGE UP (ref 40–120)
ALT FLD-CCNC: 16 U/L — SIGNIFICANT CHANGE UP
ANION GAP SERPL CALC-SCNC: 16 MMOL/L — SIGNIFICANT CHANGE UP (ref 5–17)
AST SERPL-CCNC: 22 U/L — SIGNIFICANT CHANGE UP
BASOPHILS # BLD AUTO: 0 K/UL — SIGNIFICANT CHANGE UP (ref 0–0.2)
BASOPHILS NFR BLD AUTO: 0 % — SIGNIFICANT CHANGE UP (ref 0–2)
BILIRUB SERPL-MCNC: 0.5 MG/DL — SIGNIFICANT CHANGE UP (ref 0.4–2)
BUN SERPL-MCNC: 13.2 MG/DL — SIGNIFICANT CHANGE UP (ref 8–20)
CALCIUM SERPL-MCNC: 8.2 MG/DL — LOW (ref 8.4–10.5)
CHLORIDE SERPL-SCNC: 101 MMOL/L — SIGNIFICANT CHANGE UP (ref 98–107)
CO2 SERPL-SCNC: 22 MMOL/L — SIGNIFICANT CHANGE UP (ref 22–29)
CREAT SERPL-MCNC: 0.76 MG/DL — SIGNIFICANT CHANGE UP (ref 0.5–1.3)
EGFR: 119 ML/MIN/1.73M2 — SIGNIFICANT CHANGE UP
EOSINOPHIL # BLD AUTO: 0.13 K/UL — SIGNIFICANT CHANGE UP (ref 0–0.5)
EOSINOPHIL NFR BLD AUTO: 0.9 % — SIGNIFICANT CHANGE UP (ref 0–6)
ETHANOL SERPL-MCNC: <10 MG/DL — SIGNIFICANT CHANGE UP (ref 0–9)
GIANT PLATELETS BLD QL SMEAR: PRESENT — SIGNIFICANT CHANGE UP
GLUCOSE SERPL-MCNC: 95 MG/DL — SIGNIFICANT CHANGE UP (ref 70–99)
HCT VFR BLD CALC: 39.3 % — SIGNIFICANT CHANGE UP (ref 39–50)
HGB BLD-MCNC: 14.3 G/DL — SIGNIFICANT CHANGE UP (ref 13–17)
LIDOCAIN IGE QN: 13 U/L — LOW (ref 22–51)
LYMPHOCYTES # BLD AUTO: 0.52 K/UL — LOW (ref 1–3.3)
LYMPHOCYTES # BLD AUTO: 3.5 % — LOW (ref 13–44)
MANUAL SMEAR VERIFICATION: SIGNIFICANT CHANGE UP
MCHC RBC-ENTMCNC: 35.3 PG — HIGH (ref 27–34)
MCHC RBC-ENTMCNC: 36.4 GM/DL — HIGH (ref 32–36)
MCV RBC AUTO: 97 FL — SIGNIFICANT CHANGE UP (ref 80–100)
MONOCYTES # BLD AUTO: 0.39 K/UL — SIGNIFICANT CHANGE UP (ref 0–0.9)
MONOCYTES NFR BLD AUTO: 2.6 % — SIGNIFICANT CHANGE UP (ref 2–14)
NEUTROPHILS # BLD AUTO: 13.93 K/UL — HIGH (ref 1.8–7.4)
NEUTROPHILS NFR BLD AUTO: 93 % — HIGH (ref 43–77)
PLAT MORPH BLD: NORMAL — SIGNIFICANT CHANGE UP
PLATELET # BLD AUTO: 207 K/UL — SIGNIFICANT CHANGE UP (ref 150–400)
POTASSIUM SERPL-MCNC: 3.9 MMOL/L — SIGNIFICANT CHANGE UP (ref 3.5–5.3)
POTASSIUM SERPL-SCNC: 3.9 MMOL/L — SIGNIFICANT CHANGE UP (ref 3.5–5.3)
PROT SERPL-MCNC: 6.3 G/DL — LOW (ref 6.6–8.7)
RBC # BLD: 4.05 M/UL — LOW (ref 4.2–5.8)
RBC # FLD: 11.7 % — SIGNIFICANT CHANGE UP (ref 10.3–14.5)
RBC BLD AUTO: NORMAL — SIGNIFICANT CHANGE UP
SODIUM SERPL-SCNC: 139 MMOL/L — SIGNIFICANT CHANGE UP (ref 135–145)
WBC # BLD: 14.98 K/UL — HIGH (ref 3.8–10.5)
WBC # FLD AUTO: 14.98 K/UL — HIGH (ref 3.8–10.5)

## 2022-07-27 PROCEDURE — 80307 DRUG TEST PRSMV CHEM ANLYZR: CPT

## 2022-07-27 PROCEDURE — 83605 ASSAY OF LACTIC ACID: CPT

## 2022-07-27 PROCEDURE — 99284 EMERGENCY DEPT VISIT MOD MDM: CPT

## 2022-07-27 PROCEDURE — 99284 EMERGENCY DEPT VISIT MOD MDM: CPT | Mod: 25

## 2022-07-27 PROCEDURE — 80053 COMPREHEN METABOLIC PANEL: CPT

## 2022-07-27 PROCEDURE — 96376 TX/PRO/DX INJ SAME DRUG ADON: CPT

## 2022-07-27 PROCEDURE — 96374 THER/PROPH/DIAG INJ IV PUSH: CPT

## 2022-07-27 PROCEDURE — 85025 COMPLETE CBC W/AUTO DIFF WBC: CPT

## 2022-07-27 PROCEDURE — 36415 COLL VENOUS BLD VENIPUNCTURE: CPT

## 2022-07-27 PROCEDURE — 83690 ASSAY OF LIPASE: CPT

## 2022-07-27 PROCEDURE — 96375 TX/PRO/DX INJ NEW DRUG ADDON: CPT

## 2022-07-27 PROCEDURE — 96361 HYDRATE IV INFUSION ADD-ON: CPT

## 2022-07-27 PROCEDURE — 96372 THER/PROPH/DIAG INJ SC/IM: CPT | Mod: XU

## 2022-07-27 RX ORDER — SODIUM CHLORIDE 9 MG/ML
1000 INJECTION INTRAMUSCULAR; INTRAVENOUS; SUBCUTANEOUS ONCE
Refills: 0 | Status: COMPLETED | OUTPATIENT
Start: 2022-07-27 | End: 2022-07-27

## 2022-07-27 RX ORDER — DIAZEPAM 5 MG
5 TABLET ORAL ONCE
Refills: 0 | Status: DISCONTINUED | OUTPATIENT
Start: 2022-07-27 | End: 2022-07-27

## 2022-07-27 RX ORDER — FAMOTIDINE 10 MG/ML
20 INJECTION INTRAVENOUS ONCE
Refills: 0 | Status: COMPLETED | OUTPATIENT
Start: 2022-07-27 | End: 2022-07-27

## 2022-07-27 RX ORDER — HALOPERIDOL DECANOATE 100 MG/ML
2.5 INJECTION INTRAMUSCULAR ONCE
Refills: 0 | Status: COMPLETED | OUTPATIENT
Start: 2022-07-27 | End: 2022-07-27

## 2022-07-27 RX ORDER — MORPHINE SULFATE 50 MG/1
4 CAPSULE, EXTENDED RELEASE ORAL ONCE
Refills: 0 | Status: DISCONTINUED | OUTPATIENT
Start: 2022-07-27 | End: 2022-07-27

## 2022-07-27 RX ORDER — CAPSAICIN 0.025 %
1 CREAM (GRAM) TOPICAL ONCE
Refills: 0 | Status: COMPLETED | OUTPATIENT
Start: 2022-07-27 | End: 2022-07-27

## 2022-07-27 RX ADMIN — Medication 1 APPLICATION(S): at 21:31

## 2022-07-27 RX ADMIN — SODIUM CHLORIDE 1000 MILLILITER(S): 9 INJECTION INTRAMUSCULAR; INTRAVENOUS; SUBCUTANEOUS at 20:17

## 2022-07-27 RX ADMIN — MORPHINE SULFATE 4 MILLIGRAM(S): 50 CAPSULE, EXTENDED RELEASE ORAL at 21:31

## 2022-07-27 RX ADMIN — SODIUM CHLORIDE 1000 MILLILITER(S): 9 INJECTION INTRAMUSCULAR; INTRAVENOUS; SUBCUTANEOUS at 18:38

## 2022-07-27 RX ADMIN — Medication 5 MILLIGRAM(S): at 20:18

## 2022-07-27 RX ADMIN — HALOPERIDOL DECANOATE 2.5 MILLIGRAM(S): 100 INJECTION INTRAMUSCULAR at 20:18

## 2022-07-27 RX ADMIN — HALOPERIDOL DECANOATE 2.5 MILLIGRAM(S): 100 INJECTION INTRAMUSCULAR at 18:39

## 2022-07-27 RX ADMIN — FAMOTIDINE 20 MILLIGRAM(S): 10 INJECTION INTRAVENOUS at 18:38

## 2022-07-27 RX ADMIN — Medication 5 MILLIGRAM(S): at 18:48

## 2022-07-27 NOTE — ED PROVIDER NOTE - PATIENT PORTAL LINK FT
You can access the FollowMyHealth Patient Portal offered by [facility] by registering at the following website: [email or web address]. By joining [employer]’s FollowMyHealth portal, you will also be able to view your health information using other applications (apps) compatible with our system.

## 2022-07-27 NOTE — ED PROVIDER NOTE - NSICDXPASTMEDICALHX_GEN_ALL_CORE_FT
PAST MEDICAL HISTORY:  Colitis     ETOH abuse     Gastritis     Pancreatitis     Stomach ulcer

## 2022-07-27 NOTE — ED ADULT TRIAGE NOTE - CHIEF COMPLAINT QUOTE
nausea, vomiting, abdominal pain. "I usually smoke weed and this happens but I didn't smoke weed today."

## 2022-07-27 NOTE — ED PROVIDER NOTE - OBJECTIVE STATEMENT
36 y/o male with PMHx of cyclic vomiting, pancreatitis, presents to the ED c/o abdominal pain and N/V that started this morning. Pt states he smokes marijuana regularly but did not prior to episode.

## 2022-07-27 NOTE — ED ADULT NURSE NOTE - OBJECTIVE STATEMENT
Pt c/o generalized body pain, nausea and vomiting, pt writhing in pain in stretcher, pt states he smokes marijuana but didn't today, pt seen in ED for similar symptoms in past, AOx3

## 2022-08-23 ENCOUNTER — EMERGENCY (EMERGENCY)
Facility: HOSPITAL | Age: 37
LOS: 1 days | Discharge: HOME | End: 2022-08-23
Attending: EMERGENCY MEDICINE
Payer: MEDICAID

## 2022-08-23 VITALS
HEIGHT: 71 IN | SYSTOLIC BLOOD PRESSURE: 130 MMHG | DIASTOLIC BLOOD PRESSURE: 79 MMHG | RESPIRATION RATE: 18 BRPM | TEMPERATURE: 97 F | HEART RATE: 64 BPM | WEIGHT: 164.91 LBS | OXYGEN SATURATION: 100 %

## 2022-08-23 LAB
ALBUMIN SERPL ELPH-MCNC: 4.9 G/DL — SIGNIFICANT CHANGE UP (ref 3.3–5.2)
ALP SERPL-CCNC: 84 U/L — SIGNIFICANT CHANGE UP (ref 40–120)
ALT FLD-CCNC: 25 U/L — SIGNIFICANT CHANGE UP
ANION GAP SERPL CALC-SCNC: 19 MMOL/L — HIGH (ref 5–17)
AST SERPL-CCNC: 23 U/L — SIGNIFICANT CHANGE UP
BASOPHILS # BLD AUTO: 0.13 K/UL — SIGNIFICANT CHANGE UP (ref 0–0.2)
BASOPHILS NFR BLD AUTO: 0.9 % — SIGNIFICANT CHANGE UP (ref 0–2)
BILIRUB SERPL-MCNC: 0.5 MG/DL — SIGNIFICANT CHANGE UP (ref 0.4–2)
BUN SERPL-MCNC: 13 MG/DL — SIGNIFICANT CHANGE UP (ref 8–20)
CALCIUM SERPL-MCNC: 10.1 MG/DL — SIGNIFICANT CHANGE UP (ref 8.4–10.5)
CHLORIDE SERPL-SCNC: 99 MMOL/L — SIGNIFICANT CHANGE UP (ref 98–107)
CO2 SERPL-SCNC: 21 MMOL/L — LOW (ref 22–29)
CREAT SERPL-MCNC: 0.89 MG/DL — SIGNIFICANT CHANGE UP (ref 0.5–1.3)
EGFR: 114 ML/MIN/1.73M2 — SIGNIFICANT CHANGE UP
EOSINOPHIL # BLD AUTO: 0.13 K/UL — SIGNIFICANT CHANGE UP (ref 0–0.5)
EOSINOPHIL NFR BLD AUTO: 0.9 % — SIGNIFICANT CHANGE UP (ref 0–6)
GLUCOSE SERPL-MCNC: 119 MG/DL — HIGH (ref 70–99)
HCT VFR BLD CALC: 46.4 % — SIGNIFICANT CHANGE UP (ref 39–50)
HGB BLD-MCNC: 17 G/DL — SIGNIFICANT CHANGE UP (ref 13–17)
LIDOCAIN IGE QN: 22 U/L — SIGNIFICANT CHANGE UP (ref 22–51)
LYMPHOCYTES # BLD AUTO: 0.87 K/UL — LOW (ref 1–3.3)
LYMPHOCYTES # BLD AUTO: 6.1 % — LOW (ref 13–44)
MANUAL SMEAR VERIFICATION: SIGNIFICANT CHANGE UP
MCHC RBC-ENTMCNC: 35.3 PG — HIGH (ref 27–34)
MCHC RBC-ENTMCNC: 36.6 GM/DL — HIGH (ref 32–36)
MCV RBC AUTO: 96.3 FL — SIGNIFICANT CHANGE UP (ref 80–100)
MONOCYTES # BLD AUTO: 1.12 K/UL — HIGH (ref 0–0.9)
MONOCYTES NFR BLD AUTO: 7.8 % — SIGNIFICANT CHANGE UP (ref 2–14)
NEUTROPHILS # BLD AUTO: 12.06 K/UL — HIGH (ref 1.8–7.4)
NEUTROPHILS NFR BLD AUTO: 84.3 % — HIGH (ref 43–77)
PLAT MORPH BLD: NORMAL — SIGNIFICANT CHANGE UP
PLATELET # BLD AUTO: 333 K/UL — SIGNIFICANT CHANGE UP (ref 150–400)
POTASSIUM SERPL-MCNC: 3.4 MMOL/L — LOW (ref 3.5–5.3)
POTASSIUM SERPL-SCNC: 3.4 MMOL/L — LOW (ref 3.5–5.3)
PROT SERPL-MCNC: 8 G/DL — SIGNIFICANT CHANGE UP (ref 6.6–8.7)
RAPID RVP RESULT: SIGNIFICANT CHANGE UP
RBC # BLD: 4.82 M/UL — SIGNIFICANT CHANGE UP (ref 4.2–5.8)
RBC # FLD: 12.1 % — SIGNIFICANT CHANGE UP (ref 10.3–14.5)
RBC BLD AUTO: NORMAL — SIGNIFICANT CHANGE UP
SARS-COV-2 RNA SPEC QL NAA+PROBE: SIGNIFICANT CHANGE UP
SODIUM SERPL-SCNC: 139 MMOL/L — SIGNIFICANT CHANGE UP (ref 135–145)
WBC # BLD: 14.31 K/UL — HIGH (ref 3.8–10.5)
WBC # FLD AUTO: 14.31 K/UL — HIGH (ref 3.8–10.5)

## 2022-08-23 PROCEDURE — 96376 TX/PRO/DX INJ SAME DRUG ADON: CPT

## 2022-08-23 PROCEDURE — 85025 COMPLETE CBC W/AUTO DIFF WBC: CPT

## 2022-08-23 PROCEDURE — 36415 COLL VENOUS BLD VENIPUNCTURE: CPT

## 2022-08-23 PROCEDURE — 99285 EMERGENCY DEPT VISIT HI MDM: CPT

## 2022-08-23 PROCEDURE — 71045 X-RAY EXAM CHEST 1 VIEW: CPT | Mod: 26

## 2022-08-23 PROCEDURE — 93010 ELECTROCARDIOGRAM REPORT: CPT

## 2022-08-23 PROCEDURE — 96374 THER/PROPH/DIAG INJ IV PUSH: CPT

## 2022-08-23 PROCEDURE — 93005 ELECTROCARDIOGRAM TRACING: CPT

## 2022-08-23 PROCEDURE — 99285 EMERGENCY DEPT VISIT HI MDM: CPT | Mod: 25

## 2022-08-23 PROCEDURE — 96375 TX/PRO/DX INJ NEW DRUG ADDON: CPT

## 2022-08-23 PROCEDURE — 80053 COMPREHEN METABOLIC PANEL: CPT

## 2022-08-23 PROCEDURE — 83690 ASSAY OF LIPASE: CPT

## 2022-08-23 PROCEDURE — 71045 X-RAY EXAM CHEST 1 VIEW: CPT

## 2022-08-23 PROCEDURE — 0225U NFCT DS DNA&RNA 21 SARSCOV2: CPT

## 2022-08-23 RX ORDER — ACETAMINOPHEN 500 MG
1000 TABLET ORAL ONCE
Refills: 0 | Status: COMPLETED | OUTPATIENT
Start: 2022-08-23 | End: 2022-08-23

## 2022-08-23 RX ORDER — HALOPERIDOL DECANOATE 100 MG/ML
5 INJECTION INTRAMUSCULAR ONCE
Refills: 0 | Status: COMPLETED | OUTPATIENT
Start: 2022-08-23 | End: 2022-08-23

## 2022-08-23 RX ORDER — HYDROMORPHONE HYDROCHLORIDE 2 MG/ML
0.5 INJECTION INTRAMUSCULAR; INTRAVENOUS; SUBCUTANEOUS ONCE
Refills: 0 | Status: DISCONTINUED | OUTPATIENT
Start: 2022-08-23 | End: 2022-08-23

## 2022-08-23 RX ORDER — METOCLOPRAMIDE HCL 10 MG
10 TABLET ORAL ONCE
Refills: 0 | Status: COMPLETED | OUTPATIENT
Start: 2022-08-23 | End: 2022-08-23

## 2022-08-23 RX ORDER — SODIUM CHLORIDE 9 MG/ML
2300 INJECTION INTRAMUSCULAR; INTRAVENOUS; SUBCUTANEOUS ONCE
Refills: 0 | Status: COMPLETED | OUTPATIENT
Start: 2022-08-23 | End: 2022-08-23

## 2022-08-23 RX ORDER — ONDANSETRON 8 MG/1
4 TABLET, FILM COATED ORAL ONCE
Refills: 0 | Status: COMPLETED | OUTPATIENT
Start: 2022-08-23 | End: 2022-08-23

## 2022-08-23 RX ADMIN — Medication 400 MILLIGRAM(S): at 16:39

## 2022-08-23 RX ADMIN — HYDROMORPHONE HYDROCHLORIDE 0.5 MILLIGRAM(S): 2 INJECTION INTRAMUSCULAR; INTRAVENOUS; SUBCUTANEOUS at 16:37

## 2022-08-23 RX ADMIN — ONDANSETRON 4 MILLIGRAM(S): 8 TABLET, FILM COATED ORAL at 16:39

## 2022-08-23 RX ADMIN — SODIUM CHLORIDE 2300 MILLILITER(S): 9 INJECTION INTRAMUSCULAR; INTRAVENOUS; SUBCUTANEOUS at 16:39

## 2022-08-23 RX ADMIN — HALOPERIDOL DECANOATE 5 MILLIGRAM(S): 100 INJECTION INTRAMUSCULAR at 17:23

## 2022-08-23 RX ADMIN — HYDROMORPHONE HYDROCHLORIDE 0.5 MILLIGRAM(S): 2 INJECTION INTRAMUSCULAR; INTRAVENOUS; SUBCUTANEOUS at 17:22

## 2022-08-23 RX ADMIN — Medication 10 MILLIGRAM(S): at 18:41

## 2022-08-23 NOTE — ED PROVIDER NOTE - OBJECTIVE STATEMENT
The patient is a 37 year old male presents with abdominal pain and vomiting with history of pancreatitis and cyclic vomiting syndrome stopped drinking and smoke marijuana 3 days ago  The patient states he had similar symptoms many times in the past

## 2022-08-23 NOTE — ED ADULT NURSE NOTE - CADM POA URETHRAL CATHETER
No How Severe Are Your Spot(S)?: mild Have Your Spot(S) Been Treated In The Past?: has not been treated Hpi Title: Evaluation of Skin Lesions

## 2022-08-23 NOTE — ED PROVIDER NOTE - ATTENDING CONTRIBUTION TO CARE
The patient seen and examined    Cyclic Vomiting Syndrome    I, Polo Donaldson, performed the initial face to face bedside interview with this patient regarding history of present illness, review of symptoms and relevant past medical, social and family history.  I completed an independent physical examination.  I was the initial provider who evaluated this patient. I have signed out the follow up of any pending tests (i.e. labs, radiological studies) to the resident.  I have communicated the patient’s plan of care and disposition with the resident

## 2022-08-23 NOTE — ED PROVIDER NOTE - CLINICAL SUMMARY MEDICAL DECISION MAKING FREE TEXT BOX
Will check labs, r/o pancreatitis, give fluids, zofran for nausea, control pain, and continue to monitor.

## 2022-08-23 NOTE — ED PROVIDER NOTE - PROGRESS NOTE DETAILS
JK - 36 yo male with PMHx marijuana use, cyclic vomiting syndrome, pancreatitis, presenting with n/v and epigastric pain after discontinuing alcohol and marijuana use 3 days ago. VSS, oropharynx clear, diaphoretic appearing, lungs ctab, belly soft mildly ttp in the epigastrium, no peritoneal signs, no peripheral edema. Will check labs, r/o pancreatitis, give fluids, zofran for nausea, control pain, and continue to monitor. JK - patient vss, resting comfortably, in no apparent distress. Labs WNL, patient belly soft nontender. Ready and agreeable to DC with return precautions. No active vomiting, currently well appearing.

## 2022-08-23 NOTE — ED PROVIDER NOTE - CONSTITUTIONAL, MLM
normal... Diaphoretic ill appearing, awake, alert, oriented to person, place, time/situation and in severe pain distress.

## 2022-08-23 NOTE — ED ADULT NURSE NOTE - OBJECTIVE STATEMENT
AAOx3 c/o nausea and vomiting today. Patient diaphoretic. Patient labs sent, IV placed. Medication given as per provider.

## 2022-08-23 NOTE — ED ADULT NURSE NOTE - CHIEF COMPLAINT
Patient understands condition, prognosis and need for follow up care. The patient is a 37y Male complaining of vomiting.

## 2022-08-23 NOTE — ED ADULT NURSE REASSESSMENT NOTE - NS ED NURSE REASSESS COMMENT FT1
Patient given medication and now has resumed vomiting and sweating. Patient remains to be monitored.

## 2022-08-23 NOTE — ED PROVIDER NOTE - DOMESTIC TRAVEL HIGH RISK QUESTION
Patient: Kem Stewart Date: 2018   : 1968 Attending: Robert Bailey MD   49 year old male      DIAGNOSIS:  F32.9 Single current episode of major depressive disorder, unspecified depression episode severity  (primary encounter diagnosis)  E11.621, L97.524 Diabetic ulcer of toe of left foot associated with type 2 diabetes mellitus, with necrosis of bone (CMS/HCC)  F41.9, F32.9 Anxiety and depression    Chief Complaint:Foot gangrene    Subjective: Awake, feels better today. Denies any nausea vomiting. No shortness of breath.     Pertinent Reviewed: Allergies    Vital 24 Hour Range Most Recent Value   Temperature Temp  Min: 96.4 °F (35.8 °C)  Max: 98.2 °F (36.8 °C) 96.4 °F (35.8 °C)   Pulse Pulse  Min: 60  Max: 78 66   Respiratory Resp  Min: 18  Max: 18 18   Non-Invasive  Blood Pressure BP  Min: 118/58  Max: 148/69 148/69 (18 0800)   Pulse Oximetry SpO2  Min: 95 %  Max: 95 % 95 %   Arterial  Blood Pressure No Data Recorded     O2 No Data Recorded 1 L/min     Vital Most Recent Value First Value   Weight 130.9 kg Weight: (!) 136.8 kg   Height 5' 9\" (175.3 cm) Height: 5' 9\" (175.3 cm)   BMI 42.69 N/A     Weight overthe past 48 Hours:   Patient Vitals for the past 48 hrs:   Weight   18 0520 134.1 kg   18 0700 130.9 kg       Medications/Infusions:  Scheduled:   • pantoprazole  40 mg Oral Nightly   • hydrALAZINE  50 mg Oral TID   • meropenem (MERREM) IVPB  500 mg Intravenous Daily   • furosemide  100 mg Oral BID   • sertraline  50 mg Oral Daily   • insulin glargine  55 Units Subcutaneous Nightly   • polyethylene glycol  17 g Oral BID   • insulin lispro   Subcutaneous TID AC   • DAPTOmycin  6 mg/kg (Adjusted) Intravenous Q48H   • senna  2 tablet Oral Nightly   • Peritoneal dialysis with additives automated (cycler)   Intraperitoneal Nightly   • Peritoneal dialysis with additives automated (cycler)   Intraperitoneal Nightly   • sodium chloride (PF)  10 mL Injection 3 times per day   • calcium  acetate gelcap  1,334 mg Oral TID WC   • lanthanum  1,000 mg Oral TID WC   • amLODIPine  5 mg Oral BID   • epoetin ghislaine  15,000 Units Subcutaneous Once per day on Mon Wed Fri   • gentamicin   Topical Once per day on Mon Wed Fri   • B complex-vitamin C-folic acid  1 tablet Oral Daily   • clopidogrel  75 mg Oral Daily   • losartan  100 mg Oral Nightly   • aspirin  81 mg Oral Daily   • calcitRIOL  0.25 mcg Oral Daily   • atorvastatin  40 mg Oral QHS   • potassium chloride  20 mEq Oral QHS   • metoPROLOL tartrate  100 mg Oral 2 times per day   • lidocaine  1 patch Transdermal Daily    And   • lidocaine  1 patch Transdermal Nightly   • Peritoneal dialysis with additives automated (cycler)   Intraperitoneal Nightly   • Peritoneal dialysis with additives automated (cycler)   Intraperitoneal Nightly       Last Stool Occurrence: 1 (med loose bm) (06/30/18 0200)    Intake/Output:      Intake/Output Summary (Last 24 hours) at 06/30/18 1312  Last data filed at 06/30/18 0730   Gross per 24 hour   Intake              400 ml   Output             2355 ml   Net            -1955 ml     Physical Exam:   GENERAL:  The patient is awake, appears comfortable.  Patient is alert and oriented x3.  NECK:  Supple, no thyromegaly, JVD or carotid bruit appreciated.  LUNGS:  Equal breath sounds noted bilaterally.    CARDIOVASCULAR:  Regular rate and rhythm.  Normal S1, S2.  No S3.  ABDOMEN:  Soft, nontender, nondistended. Bowel sounds positive. PD catheter in place.  EXTREMITIES: 1+ bilateral lower extremity edema is noted. Both feet for dressing in place.   NEUROLOGIC:  Cranial nerves 2-12 appear intact.  No gross focal deficits noted.    Laboratory Results:    Lab Results   Component Value Date    SODIUM 134 (L) 06/30/2018    POTASSIUM 4.1 06/30/2018    BUN 61 (H) 06/30/2018    CREATININE 9.31 (H) 06/30/2018    WBC 12.2 (H) 06/29/2018    HCT 27.3 (L) 06/29/2018    HGB 8.0 (L) 06/29/2018     06/29/2018    INR 1.1 06/16/2018    RAPDTR 0.23  (HH) 06/03/2014    PTT 36 (H) 06/07/2018    GLUCOSE 152 (H) 06/30/2018    TSH 5.408 (H) 04/09/2018     (H) 05/05/2017    CHOLESTEROL 102 05/05/2017    HDL 24 (L) 05/05/2017    CALCLDL 13 05/05/2017    TRIGLYCERIDE 325 (H) 05/05/2017    MG 2.4 06/29/2018    ELISE 1.16 06/29/2018    MRSAPC NOT DETECTED 04/11/2018       Assessment:  S/p left foot TMA, right foot 4th toe amputation  PAD, s/p balloon angioplasty of the right posterior tibial artery  Diabetes mellitus type 2  End-stage renal disease, on peritoneal dialysis  Hypertension  History of coronary artery disease  Dyslipidemia  Anemia of chronic disease    Plan:  Plan for BKA on 7/4  LLE venous Doppler negative for DVT  Decreased hydralazine to 50 mg t.i.d. continue metoprolol, Norvasc  MRI L Tibia/fibule - incomplete study, - ve for osteo. Ortho following  LE, arterial Doppler results noted, no acute stenosis  Continue IV antibiotics per ID  Continue wound care, HBO.   Monitor hemoglobin, s/p prbc's 6/18  Continue MiraLAX, senna  On peritoneal dialysis. Needs extra UF per renal  Started on sertraline for depression  Continue Lantus, lispro  Continue aspirin, Plavix, statin  PT, OT    Robert Bailey MD  6/30/2018  1:12 PM       No

## 2022-08-23 NOTE — ED PROVIDER NOTE - PATIENT PORTAL LINK FT
You can access the FollowMyHealth Patient Portal offered by [facility] by registering at the following website: [email or web address]. By joining [employer]’s FollowMyHealth portal, you will also be able to view your health information using other applications (apps) compatible with our system.

## 2022-12-05 ENCOUNTER — EMERGENCY (EMERGENCY)
Facility: HOSPITAL | Age: 37
LOS: 1 days | Discharge: LEFT BEFORE DISCHARGE | End: 2022-12-05
Attending: STUDENT IN AN ORGANIZED HEALTH CARE EDUCATION/TRAINING PROGRAM
Payer: MEDICAID

## 2022-12-05 VITALS
SYSTOLIC BLOOD PRESSURE: 129 MMHG | RESPIRATION RATE: 22 BRPM | OXYGEN SATURATION: 100 % | DIASTOLIC BLOOD PRESSURE: 66 MMHG | HEART RATE: 60 BPM

## 2022-12-05 VITALS
SYSTOLIC BLOOD PRESSURE: 192 MMHG | HEART RATE: 105 BPM | HEIGHT: 65 IN | RESPIRATION RATE: 28 BRPM | OXYGEN SATURATION: 99 % | DIASTOLIC BLOOD PRESSURE: 132 MMHG | WEIGHT: 145.06 LBS

## 2022-12-05 LAB
ALBUMIN SERPL ELPH-MCNC: 4.7 G/DL — SIGNIFICANT CHANGE UP (ref 3.3–5.2)
ALP SERPL-CCNC: 88 U/L — SIGNIFICANT CHANGE UP (ref 40–120)
ALT FLD-CCNC: 20 U/L — SIGNIFICANT CHANGE UP
ANION GAP SERPL CALC-SCNC: 18 MMOL/L — HIGH (ref 5–17)
AST SERPL-CCNC: 19 U/L — SIGNIFICANT CHANGE UP
BASOPHILS # BLD AUTO: 0.06 K/UL — SIGNIFICANT CHANGE UP (ref 0–0.2)
BASOPHILS NFR BLD AUTO: 0.4 % — SIGNIFICANT CHANGE UP (ref 0–2)
BILIRUB SERPL-MCNC: 0.3 MG/DL — LOW (ref 0.4–2)
BUN SERPL-MCNC: 15.2 MG/DL — SIGNIFICANT CHANGE UP (ref 8–20)
CALCIUM SERPL-MCNC: 9.7 MG/DL — SIGNIFICANT CHANGE UP (ref 8.4–10.5)
CHLORIDE SERPL-SCNC: 100 MMOL/L — SIGNIFICANT CHANGE UP (ref 96–108)
CO2 SERPL-SCNC: 21 MMOL/L — LOW (ref 22–29)
CREAT SERPL-MCNC: 0.79 MG/DL — SIGNIFICANT CHANGE UP (ref 0.5–1.3)
EGFR: 118 ML/MIN/1.73M2 — SIGNIFICANT CHANGE UP
EOSINOPHIL # BLD AUTO: 0.28 K/UL — SIGNIFICANT CHANGE UP (ref 0–0.5)
EOSINOPHIL NFR BLD AUTO: 2.1 % — SIGNIFICANT CHANGE UP (ref 0–6)
GLUCOSE SERPL-MCNC: 133 MG/DL — HIGH (ref 70–99)
HCT VFR BLD CALC: 43.1 % — SIGNIFICANT CHANGE UP (ref 39–50)
HGB BLD-MCNC: 15.7 G/DL — SIGNIFICANT CHANGE UP (ref 13–17)
IMM GRANULOCYTES NFR BLD AUTO: 0.5 % — SIGNIFICANT CHANGE UP (ref 0–0.9)
LIDOCAIN IGE QN: 40 U/L — SIGNIFICANT CHANGE UP (ref 22–51)
LYMPHOCYTES # BLD AUTO: 16.4 % — SIGNIFICANT CHANGE UP (ref 13–44)
LYMPHOCYTES # BLD AUTO: 2.21 K/UL — SIGNIFICANT CHANGE UP (ref 1–3.3)
MAGNESIUM SERPL-MCNC: 1.9 MG/DL — SIGNIFICANT CHANGE UP (ref 1.6–2.6)
MCHC RBC-ENTMCNC: 34.1 PG — HIGH (ref 27–34)
MCHC RBC-ENTMCNC: 36.4 GM/DL — HIGH (ref 32–36)
MCV RBC AUTO: 93.7 FL — SIGNIFICANT CHANGE UP (ref 80–100)
MONOCYTES # BLD AUTO: 0.94 K/UL — HIGH (ref 0–0.9)
MONOCYTES NFR BLD AUTO: 7 % — SIGNIFICANT CHANGE UP (ref 2–14)
NEUTROPHILS # BLD AUTO: 9.88 K/UL — HIGH (ref 1.8–7.4)
NEUTROPHILS NFR BLD AUTO: 73.6 % — SIGNIFICANT CHANGE UP (ref 43–77)
PLATELET # BLD AUTO: 292 K/UL — SIGNIFICANT CHANGE UP (ref 150–400)
POTASSIUM SERPL-MCNC: 3.8 MMOL/L — SIGNIFICANT CHANGE UP (ref 3.5–5.3)
POTASSIUM SERPL-SCNC: 3.8 MMOL/L — SIGNIFICANT CHANGE UP (ref 3.5–5.3)
PROT SERPL-MCNC: 7.2 G/DL — SIGNIFICANT CHANGE UP (ref 6.6–8.7)
RBC # BLD: 4.6 M/UL — SIGNIFICANT CHANGE UP (ref 4.2–5.8)
RBC # FLD: 11.7 % — SIGNIFICANT CHANGE UP (ref 10.3–14.5)
SODIUM SERPL-SCNC: 139 MMOL/L — SIGNIFICANT CHANGE UP (ref 135–145)
WBC # BLD: 13.44 K/UL — HIGH (ref 3.8–10.5)
WBC # FLD AUTO: 13.44 K/UL — HIGH (ref 3.8–10.5)

## 2022-12-05 PROCEDURE — 96374 THER/PROPH/DIAG INJ IV PUSH: CPT

## 2022-12-05 PROCEDURE — 96361 HYDRATE IV INFUSION ADD-ON: CPT

## 2022-12-05 PROCEDURE — 80053 COMPREHEN METABOLIC PANEL: CPT

## 2022-12-05 PROCEDURE — 99285 EMERGENCY DEPT VISIT HI MDM: CPT

## 2022-12-05 PROCEDURE — 85025 COMPLETE CBC W/AUTO DIFF WBC: CPT

## 2022-12-05 PROCEDURE — 71045 X-RAY EXAM CHEST 1 VIEW: CPT

## 2022-12-05 PROCEDURE — 99284 EMERGENCY DEPT VISIT MOD MDM: CPT | Mod: 25

## 2022-12-05 PROCEDURE — 96376 TX/PRO/DX INJ SAME DRUG ADON: CPT

## 2022-12-05 PROCEDURE — 36415 COLL VENOUS BLD VENIPUNCTURE: CPT

## 2022-12-05 PROCEDURE — 83690 ASSAY OF LIPASE: CPT

## 2022-12-05 PROCEDURE — 83735 ASSAY OF MAGNESIUM: CPT

## 2022-12-05 PROCEDURE — 71045 X-RAY EXAM CHEST 1 VIEW: CPT | Mod: 26

## 2022-12-05 PROCEDURE — 96375 TX/PRO/DX INJ NEW DRUG ADDON: CPT

## 2022-12-05 RX ORDER — HYDROMORPHONE HYDROCHLORIDE 2 MG/ML
0.5 INJECTION INTRAMUSCULAR; INTRAVENOUS; SUBCUTANEOUS ONCE
Refills: 0 | Status: DISCONTINUED | OUTPATIENT
Start: 2022-12-05 | End: 2022-12-05

## 2022-12-05 RX ORDER — SODIUM CHLORIDE 9 MG/ML
1000 INJECTION INTRAMUSCULAR; INTRAVENOUS; SUBCUTANEOUS ONCE
Refills: 0 | Status: COMPLETED | OUTPATIENT
Start: 2022-12-05 | End: 2022-12-05

## 2022-12-05 RX ORDER — METOCLOPRAMIDE HCL 10 MG
10 TABLET ORAL ONCE
Refills: 0 | Status: DISCONTINUED | OUTPATIENT
Start: 2022-12-05 | End: 2022-12-13

## 2022-12-05 RX ORDER — ONDANSETRON 8 MG/1
4 TABLET, FILM COATED ORAL ONCE
Refills: 0 | Status: COMPLETED | OUTPATIENT
Start: 2022-12-05 | End: 2022-12-05

## 2022-12-05 RX ORDER — HALOPERIDOL DECANOATE 100 MG/ML
1 INJECTION INTRAMUSCULAR ONCE
Refills: 0 | Status: COMPLETED | OUTPATIENT
Start: 2022-12-05 | End: 2022-12-05

## 2022-12-05 RX ADMIN — HYDROMORPHONE HYDROCHLORIDE 0.5 MILLIGRAM(S): 2 INJECTION INTRAMUSCULAR; INTRAVENOUS; SUBCUTANEOUS at 19:39

## 2022-12-05 RX ADMIN — ONDANSETRON 4 MILLIGRAM(S): 8 TABLET, FILM COATED ORAL at 18:12

## 2022-12-05 RX ADMIN — ONDANSETRON 4 MILLIGRAM(S): 8 TABLET, FILM COATED ORAL at 21:09

## 2022-12-05 RX ADMIN — HALOPERIDOL DECANOATE 1 MILLIGRAM(S): 100 INJECTION INTRAMUSCULAR at 19:10

## 2022-12-05 RX ADMIN — SODIUM CHLORIDE 1000 MILLILITER(S): 9 INJECTION INTRAMUSCULAR; INTRAVENOUS; SUBCUTANEOUS at 18:12

## 2022-12-05 RX ADMIN — HYDROMORPHONE HYDROCHLORIDE 0.5 MILLIGRAM(S): 2 INJECTION INTRAMUSCULAR; INTRAVENOUS; SUBCUTANEOUS at 18:30

## 2022-12-05 RX ADMIN — SODIUM CHLORIDE 1000 MILLILITER(S): 9 INJECTION INTRAMUSCULAR; INTRAVENOUS; SUBCUTANEOUS at 19:13

## 2022-12-05 RX ADMIN — HALOPERIDOL DECANOATE 1 MILLIGRAM(S): 100 INJECTION INTRAMUSCULAR at 19:39

## 2022-12-05 RX ADMIN — HYDROMORPHONE HYDROCHLORIDE 0.5 MILLIGRAM(S): 2 INJECTION INTRAMUSCULAR; INTRAVENOUS; SUBCUTANEOUS at 18:13

## 2022-12-05 NOTE — ED PROVIDER NOTE - CLINICAL SUMMARY MEDICAL DECISION MAKING FREE TEXT BOX
38 yo male with PMHx marijuana use, cyclic vomiting syndrome, pancreatitis who presents with abdominal pain. *** 36 yo male with PMHx marijuana use, cyclic vomiting syndrome, pancreatitis who presents with abdominal pain. Labs, meds, EKG, and CXR ordered

## 2022-12-05 NOTE — ED PROVIDER NOTE - PROGRESS NOTE DETAILS
Joey: went to recheck patient and he was not in his bed. searched ED and then talked to security in triage area who states patient was seen walking out of ED holding emesis bag about 5 minutes ago. vilma PALM called and state they will send officer to his home

## 2022-12-05 NOTE — ED ADULT NURSE NOTE - NS ED NURSE ELOPE COMMENTS
pt eloped from hospital without staff members removing peripheral IV. multiple attempts were made contact pt via phone with no answer. [employer] made aware and called to do a well visit for pt

## 2022-12-05 NOTE — ED ADULT NURSE NOTE - OBJECTIVE STATEMENT
37y M in distress r/t pain, yelling at staff, c/o 10/10 diffuse abdominal pain, actively vomiting, uncooperative with interview, states he has hx of ETOH last drink "a couple months ago" hx of pancreatitis, denies drug use

## 2022-12-05 NOTE — ED ADULT TRIAGE NOTE - NS ED TRIAGE AVPU SCALE
Alert-The patient is alert, awake and responds to voice. The patient is oriented to time, place, and person. The triage nurse is able to obtain subjective information.
Patient expressed no known problems or needs

## 2022-12-05 NOTE — ED ADULT NURSE REASSESSMENT NOTE - NS ED NURSE REASSESS COMMENT FT1
pt continues to refuse EKG, provider made aware. pt given zofran for dry heaves. respirations even and unlabored. pt shows no s/s of acute distress at this time. safety precautions maintained.

## 2022-12-05 NOTE — ED PROVIDER NOTE - NS ED ROS FT
Constitutional: no fever, +chills  Head: NC, AT   Eyes: no redness   ENMT: no nasal congestion/drainage, no sore throat   CV: no chest pain, no edema  Resp: no cough, no dyspnea  GI: +abdominal pain, +nausea, +vomiting, no diarrhea  : no dysuria, no hematuria   Skin: no lesions, no rashes   Neuro: no LOC, no headache, no sensory deficits, no weakness

## 2022-12-05 NOTE — ED PROVIDER NOTE - PHYSICAL EXAMINATION
General: sitting upright, frequently wretching, sweating in pain   Head: NC, AT  EENT: EOMI, no scleral icterus  Cardiac: tachycardic, no apparent murmurs   Respiratory: CTABL, no respiratory distress   Abdomen:    MSK/Vascular: full ROM, soft compartments, warm extremities  Neuro: AAOx3, sensation to light touch intact   Psych: anxious appearing

## 2022-12-05 NOTE — ED ADULT NURSE REASSESSMENT NOTE - NS ED NURSE REASSESS COMMENT FT1
assumed care for pt at 1930, charting as noted. pt screaming in pain, stating the nerves in his back "are fried". pt medicated per order, respirations even and unlabored. pt shows no s/s of acute distress at this time. safety precautions maintained.

## 2023-02-06 ENCOUNTER — EMERGENCY (EMERGENCY)
Facility: HOSPITAL | Age: 38
LOS: 1 days | Discharge: HOME | End: 2023-02-06
Attending: EMERGENCY MEDICINE
Payer: MEDICAID

## 2023-02-06 VITALS
TEMPERATURE: 98 F | RESPIRATION RATE: 18 BRPM | SYSTOLIC BLOOD PRESSURE: 138 MMHG | HEART RATE: 68 BPM | OXYGEN SATURATION: 98 % | DIASTOLIC BLOOD PRESSURE: 82 MMHG

## 2023-02-06 VITALS
RESPIRATION RATE: 16 BRPM | SYSTOLIC BLOOD PRESSURE: 157 MMHG | OXYGEN SATURATION: 98 % | HEART RATE: 77 BPM | TEMPERATURE: 98 F | DIASTOLIC BLOOD PRESSURE: 90 MMHG

## 2023-02-06 LAB
ALBUMIN SERPL ELPH-MCNC: 4.6 G/DL — SIGNIFICANT CHANGE UP (ref 3.3–5.2)
ALP SERPL-CCNC: 73 U/L — SIGNIFICANT CHANGE UP (ref 40–120)
ALT FLD-CCNC: 19 U/L — SIGNIFICANT CHANGE UP
ANION GAP SERPL CALC-SCNC: 20 MMOL/L — HIGH (ref 5–17)
AST SERPL-CCNC: 25 U/L — SIGNIFICANT CHANGE UP
BASOPHILS # BLD AUTO: 0.14 K/UL — SIGNIFICANT CHANGE UP (ref 0–0.2)
BASOPHILS NFR BLD AUTO: 0.9 % — SIGNIFICANT CHANGE UP (ref 0–2)
BILIRUB SERPL-MCNC: 0.2 MG/DL — LOW (ref 0.4–2)
BUN SERPL-MCNC: 20.1 MG/DL — HIGH (ref 8–20)
CALCIUM SERPL-MCNC: 9.7 MG/DL — SIGNIFICANT CHANGE UP (ref 8.4–10.5)
CHLORIDE SERPL-SCNC: 102 MMOL/L — SIGNIFICANT CHANGE UP (ref 96–108)
CO2 SERPL-SCNC: 18 MMOL/L — LOW (ref 22–29)
CREAT SERPL-MCNC: 0.86 MG/DL — SIGNIFICANT CHANGE UP (ref 0.5–1.3)
EGFR: 115 ML/MIN/1.73M2 — SIGNIFICANT CHANGE UP
EOSINOPHIL # BLD AUTO: 0.4 K/UL — SIGNIFICANT CHANGE UP (ref 0–0.5)
EOSINOPHIL NFR BLD AUTO: 2.6 % — SIGNIFICANT CHANGE UP (ref 0–6)
GIANT PLATELETS BLD QL SMEAR: PRESENT — SIGNIFICANT CHANGE UP
GLUCOSE SERPL-MCNC: 144 MG/DL — HIGH (ref 70–99)
HCT VFR BLD CALC: 41.5 % — SIGNIFICANT CHANGE UP (ref 39–50)
HGB BLD-MCNC: 15.2 G/DL — SIGNIFICANT CHANGE UP (ref 13–17)
LIDOCAIN IGE QN: 30 U/L — SIGNIFICANT CHANGE UP (ref 22–51)
LYMPHOCYTES # BLD AUTO: 19.3 % — SIGNIFICANT CHANGE UP (ref 13–44)
LYMPHOCYTES # BLD AUTO: 2.98 K/UL — SIGNIFICANT CHANGE UP (ref 1–3.3)
MANUAL SMEAR VERIFICATION: SIGNIFICANT CHANGE UP
MCHC RBC-ENTMCNC: 34.2 PG — HIGH (ref 27–34)
MCHC RBC-ENTMCNC: 36.6 GM/DL — HIGH (ref 32–36)
MCV RBC AUTO: 93.3 FL — SIGNIFICANT CHANGE UP (ref 80–100)
MONOCYTES # BLD AUTO: 0.54 K/UL — SIGNIFICANT CHANGE UP (ref 0–0.9)
MONOCYTES NFR BLD AUTO: 3.5 % — SIGNIFICANT CHANGE UP (ref 2–14)
NEUTROPHILS # BLD AUTO: 11.1 K/UL — HIGH (ref 1.8–7.4)
NEUTROPHILS NFR BLD AUTO: 71.9 % — SIGNIFICANT CHANGE UP (ref 43–77)
PLAT MORPH BLD: NORMAL — SIGNIFICANT CHANGE UP
PLATELET # BLD AUTO: 279 K/UL — SIGNIFICANT CHANGE UP (ref 150–400)
POTASSIUM SERPL-MCNC: 3.4 MMOL/L — LOW (ref 3.5–5.3)
POTASSIUM SERPL-SCNC: 3.4 MMOL/L — LOW (ref 3.5–5.3)
PROT SERPL-MCNC: 6.9 G/DL — SIGNIFICANT CHANGE UP (ref 6.6–8.7)
RBC # BLD: 4.45 M/UL — SIGNIFICANT CHANGE UP (ref 4.2–5.8)
RBC # FLD: 11.9 % — SIGNIFICANT CHANGE UP (ref 10.3–14.5)
RBC BLD AUTO: NORMAL — SIGNIFICANT CHANGE UP
SODIUM SERPL-SCNC: 140 MMOL/L — SIGNIFICANT CHANGE UP (ref 135–145)
VARIANT LYMPHS # BLD: 1.8 % — SIGNIFICANT CHANGE UP (ref 0–6)
WBC # BLD: 15.44 K/UL — HIGH (ref 3.8–10.5)
WBC # FLD AUTO: 15.44 K/UL — HIGH (ref 3.8–10.5)

## 2023-02-06 PROCEDURE — 99284 EMERGENCY DEPT VISIT MOD MDM: CPT | Mod: 25

## 2023-02-06 PROCEDURE — 96374 THER/PROPH/DIAG INJ IV PUSH: CPT

## 2023-02-06 PROCEDURE — 85025 COMPLETE CBC W/AUTO DIFF WBC: CPT

## 2023-02-06 PROCEDURE — 96361 HYDRATE IV INFUSION ADD-ON: CPT

## 2023-02-06 PROCEDURE — 80053 COMPREHEN METABOLIC PANEL: CPT

## 2023-02-06 PROCEDURE — 83690 ASSAY OF LIPASE: CPT

## 2023-02-06 PROCEDURE — 36415 COLL VENOUS BLD VENIPUNCTURE: CPT

## 2023-02-06 PROCEDURE — 96376 TX/PRO/DX INJ SAME DRUG ADON: CPT

## 2023-02-06 PROCEDURE — 96375 TX/PRO/DX INJ NEW DRUG ADDON: CPT

## 2023-02-06 PROCEDURE — 99284 EMERGENCY DEPT VISIT MOD MDM: CPT

## 2023-02-06 RX ORDER — DIAZEPAM 5 MG
10 TABLET ORAL ONCE
Refills: 0 | Status: DISCONTINUED | OUTPATIENT
Start: 2023-02-06 | End: 2023-02-06

## 2023-02-06 RX ORDER — ONDANSETRON 8 MG/1
4 TABLET, FILM COATED ORAL ONCE
Refills: 0 | Status: COMPLETED | OUTPATIENT
Start: 2023-02-06 | End: 2023-02-06

## 2023-02-06 RX ORDER — HALOPERIDOL DECANOATE 100 MG/ML
5 INJECTION INTRAMUSCULAR ONCE
Refills: 0 | Status: COMPLETED | OUTPATIENT
Start: 2023-02-06 | End: 2023-02-06

## 2023-02-06 RX ORDER — POTASSIUM CHLORIDE 20 MEQ
10 PACKET (EA) ORAL ONCE
Refills: 0 | Status: DISCONTINUED | OUTPATIENT
Start: 2023-02-06 | End: 2023-02-06

## 2023-02-06 RX ORDER — ONDANSETRON 8 MG/1
1 TABLET, FILM COATED ORAL
Qty: 15 | Refills: 0
Start: 2023-02-06 | End: 2023-02-10

## 2023-02-06 RX ORDER — HYDROMORPHONE HYDROCHLORIDE 2 MG/ML
1 INJECTION INTRAMUSCULAR; INTRAVENOUS; SUBCUTANEOUS ONCE
Refills: 0 | Status: DISCONTINUED | OUTPATIENT
Start: 2023-02-06 | End: 2023-02-06

## 2023-02-06 RX ORDER — DIPHENHYDRAMINE HCL 50 MG
50 CAPSULE ORAL ONCE
Refills: 0 | Status: COMPLETED | OUTPATIENT
Start: 2023-02-06 | End: 2023-02-06

## 2023-02-06 RX ORDER — SODIUM CHLORIDE 9 MG/ML
1000 INJECTION INTRAMUSCULAR; INTRAVENOUS; SUBCUTANEOUS ONCE
Refills: 0 | Status: COMPLETED | OUTPATIENT
Start: 2023-02-06 | End: 2023-02-06

## 2023-02-06 RX ORDER — PROCHLORPERAZINE MALEATE 5 MG
10 TABLET ORAL ONCE
Refills: 0 | Status: COMPLETED | OUTPATIENT
Start: 2023-02-06 | End: 2023-02-06

## 2023-02-06 RX ADMIN — HYDROMORPHONE HYDROCHLORIDE 1 MILLIGRAM(S): 2 INJECTION INTRAMUSCULAR; INTRAVENOUS; SUBCUTANEOUS at 03:30

## 2023-02-06 RX ADMIN — Medication 10 MILLIGRAM(S): at 04:40

## 2023-02-06 RX ADMIN — ONDANSETRON 4 MILLIGRAM(S): 8 TABLET, FILM COATED ORAL at 03:30

## 2023-02-06 RX ADMIN — HALOPERIDOL DECANOATE 5 MILLIGRAM(S): 100 INJECTION INTRAMUSCULAR at 04:41

## 2023-02-06 RX ADMIN — SODIUM CHLORIDE 1000 MILLILITER(S): 9 INJECTION INTRAMUSCULAR; INTRAVENOUS; SUBCUTANEOUS at 03:31

## 2023-02-06 RX ADMIN — ONDANSETRON 4 MILLIGRAM(S): 8 TABLET, FILM COATED ORAL at 07:59

## 2023-02-06 RX ADMIN — Medication 50 MILLIGRAM(S): at 04:40

## 2023-02-06 RX ADMIN — Medication 10 MILLIGRAM(S): at 03:30

## 2023-02-06 NOTE — ED PROVIDER NOTE - CLINICAL SUMMARY MEDICAL DECISION MAKING FREE TEXT BOX
Workup is unremarkable for any emergent process.   Patient is now feeling improved and wishes to leave.  Patient / family members have capacity.    Patient/family was given full return precautions, counseled on red flag symptoms such as LOC, fever, severe pain, or focal deficits and advised to return to the ED for these reasons or any reason that was concerning to them. Patient/family was informed of all significant and incidental findings found on this workup today and all results were reviewed.   All questions were answered, advised to make close follow up with their primary care provider and specialty clinics (as applicable) to follow up with this visit and continue investigation/treatment.   Patient/family has shown adequate understanding and is agreeable to the plan.

## 2023-02-06 NOTE — ED ADULT NURSE NOTE - OBJECTIVE STATEMENT
Pt. complaining of abdominal pain with back pain and N/V. pt. states he has an hx of pancreatitis, feels the same. Patient becoming increasing loud and verbally abusive to this RN, requesting immediate attention and to hurry up. Patient stated  "I want lots of Dilaudid, if not put me out". Patient non compliant when placing IV. Labs sent, medicated as per orders.

## 2023-02-06 NOTE — ED PROVIDER NOTE - NS ED ROS FT
General: No fever, no massive bleeding  Head: No HA, no ongoing scalp bleed  ENT: no neck pain, no sore throat  Resp: No SOB, no hemoptysis  Cardiovascular: No CP, No LOC  GI: + abdominal pain, No blood in stool  : No dysuria, no hematuria   MSK: No back pain, no limb pain  Skin: No painful or bleeding lesions  Neuro: No paresthesias, No focal deficits

## 2023-02-06 NOTE — ED ADULT TRIAGE NOTE - SOURCE OF INFORMATION
SUBJECTIVE:  This patient is a 58 year old female who presents for follow up on recent hospitalization for weakness and chest pain.   Patient was admitted September 3rd and discharge September 4th for chest pain weakness and shortness of breath.  Transitional care management phone call made September 5th.  Pt has had 2 episodes of chest pain over past month, felt weak dizzy, pale.  Went to er via ems.  inpt studies done including stress test and did have ct chest 2 weeks ago.  Both negative.  Patient has had both imaging and cardiac testing done recently without any significant findings.  Patient overall has been doing okay since then.  Does have a history of diabetes mellitus hypertension hyperlipidemia    REVIEW OF SYSTEMS: no fever,  No n/v     ALLERGIES:  Cefuroxime, Amoxicillin, Bactrim ds, Celexa, Ciprofloxacin, Lisinopril, Macrodantin [nitrofurantoin], Penicillins, and Sulfa antibiotics    MEDICATIONS:    Current Outpatient Medications   Medication   • meloxicam (MOBIC) 15 MG tablet   • ALPRAZolam (XANAX) 0.25 MG tablet   • atorvastatin (LIPITOR) 10 MG tablet   • esomeprazole (NexIUM) 40 MG capsule   • Jardiance 25 MG tablet   • buPROPion XL (WELLBUTRIN XL) 150 MG 24 hr tablet   • albuterol 108 (90 Base) MCG/ACT inhaler   • metformin (GLUCOPHAGE) 1000 MG tablet   • insulin glargine (LANTUS SOLOSTAR) 100 UNIT/ML pen-injector   • candesartan (ATACAND) 8 MG tablet   • linaclotide (LINZESS) 72 MCG Cap   • gabapentin (NEURONTIN) 300 MG capsule   • trazodone (DESYREL) 150 MG tablet   • Doxylamine Succinate, Sleep, (SLEEP AID PO)   • Blood Glucose Monitoring Suppl (ACCU-CHEK ANGIE SMARTVIEW) w/Device Kit   • ACCU-CHEK SMARTVIEW test strip   • Accu-Chek FastClix Lancets Misc   • acyclovir (ZOVIRAX) 400 MG tablet   • acetaminophen (TYLENOL) 325 MG tablet   • meclizine (ANTIVERT) 25 MG tablet   • MAGNESIUM-OXIDE 400 (241.3 Mg) MG Tab   • Lancets Misc. (ACCU-CHEK FASTCLIX LANCET) Kit   • B-D U/F PEN NEEDLE 31G X 5 MM  Misc   • vitamin B-12 (CYANOCOBALAMIN) 100 MCG tablet     No current facility-administered medications for this visit.        PROBLEM LIST:  Patient Active Problem List   Diagnosis   • Type 2 diabetes mellitus with diabetic polyneuropathy, with long-term current use of insulin (CMS/[facility])   • Anxiety disorder   • Diabetic neuropathy (CMS/[facility])   • Obesity, Class III, BMI 40-49.9 (morbid obesity) (CMS/[facility])   • Hyperlipidemia   • Legal blindness OS>OD   • Pars planitis of both eyes   • Nuclear senile cataract of left eye   • After-cataract, unspecified   • Microalbuminuria   • GERD (gastroesophageal reflux disease)   • Family history of premature CAD   • Essential tremor   • Essential hypertension   • Major depressive disorder, recurrent episode, moderate (CMS/[facility])   • Cervical spine pain   • Pain in thoracic spine   • Sternum pain   • Lichen sclerosus et atrophicus   • Dysplastic nevus   • Mixed hyperlipidemia   • Family history of breast cancer in mother   • Arthritis of left knee   • Cervical strain   • Cervical spondylosis without myelopathy       SOCIAL HISTORY:  Social History     Tobacco Use   • Smoking status: Former Smoker     Packs/day: 0.25   • Smokeless tobacco: Never Used   • Tobacco comment: quit in 2011- restarted 03/2019-quit again in 03/2019   Substance Use Topics   • Alcohol use: Yes     Comment: 1-2 beers once a month       OBJECTIVE:   Blood pressure 119/63, pulse 66, weight 126 kg.  GENERAL:  Alert and oriented times three, no acute distress, well-nourished, well-developed female appearing the stated age.  CARDIAC:  S1, S2 without murmurs, radial pulses strong and regular, no carotid bruits.  LUNGS:  Clear to auscultation bilaterally, no rales, rhonchi or wheezing; normal respiratory effort.  ABDOMEN:  Soft, nontender, positive bowel sounds, no masses, no hepatosplenomegaly.  EXTREMITIES:   lower extremities with trace peripheral edema.        ASSESSMENT/PLAN:  Other fatigue  (primary encounter  Patient diagnosis)  Anxiety disorder due to known physiological condition  Major depressive disorder, recurrent episode, moderate (CMS/HCC)  Type 2 diabetes mellitus with diabetic polyneuropathy, with long-term current use of insulin (CMS/HCC)  Thoracic spondylosis  At this time feel it is more likely to be anxiety or referred pain from her thoracic spine.  Patient has been taking the Xanax in the morning on a regular basis have instructed her to change this to just as needed and if she were to start to feel the discomfort or feelings of shortness of breath to try to see if it will not help out her symptoms.  Patient's cardiopulmonary workups have been negative/are negative.  Patient could also consider if that does not work re-evaluation by spine specialist.

## 2023-02-06 NOTE — ED ADULT TRIAGE NOTE - CHIEF COMPLAINT QUOTE
Pt. complaining of abdominal pain with back pain and N/V. pt. states he has an hx of pancreatitis, feels the same.

## 2023-02-06 NOTE — ED PROVIDER NOTE - ATTENDING CONTRIBUTION TO CARE
38 yo M presents to ED c/o recurrent abd pain and vomiting.  Pt with prior hx of alcohol abuse, pancreatitis and cyclic vomiting.  Pt has appt with GI later this week.  In ED pt anxious and verbally abusive to staff, Abd soft with epigastric tenderness to palp, no R/R/G. Will hydrate, check labs, medicate for pain and re-eval

## 2023-02-06 NOTE — ED PROVIDER NOTE - PATIENT PORTAL LINK FT
You can access the FollowMyHealth Patient Portal offered by [facility] by registering at the following website: [email or web address]. By joining [employer]’s FollowMyHealth portal, you will also be able to view your health information using other applications (apps) compatible with our system.

## 2023-02-06 NOTE — ED PROVIDER NOTE - OBJECTIVE STATEMENT
36 yo male with PMHx marijuana use, cyclic vomiting syndrome, pancreatitis who presents with another episode of abdominal pain and hyperemesis simular to previous episodes tonight.  Patient states he stopped MJ/alcohol in November but still having problems, has appt with GI this week.  Otherwise denies  bleeding, SOB, chest pain, fevers.  Denies other pertinent medical problems.  Denies any other substance use.

## 2023-02-06 NOTE — ED PROVIDER NOTE - NSFOLLOWUPINSTRUCTIONS_ED_ALL_ED_FT
Cyclic Vomiting Syndrome    WHAT YOU NEED TO KNOW:    Cyclic vomiting syndrome is a condition that causes you to vomit many times in a row for no known reason. It is important to prevent dehydration and other serious complications from repeated vomiting. Work with your healthcare provider to manage or prevent episodes.    DISCHARGE INSTRUCTIONS:    Return to the emergency department if:   •You see blood in your vomit or your bowel movements.      •You have sudden, severe pain in your chest and upper abdomen after hard vomiting or retching.      •You have swelling in your neck and chest.       •You are dizzy, cold, and thirsty, and your eyes and mouth are dry.      •You are urinating very little or not at all.      •You have muscle weakness, leg cramps, and trouble breathing.      •Your heart is beating much faster than normal.      •You continue to vomit for more than 48 hours.      Contact your healthcare provider if:   •You have frequent dry heaves (vomiting but nothing comes out).      •You have questions or concerns about your condition or care.      Medicines: You may need any of the following:   •Migraine medicine may be used to prevent or stop migraine headaches. This may be given if you have migraines or are at risk because of a family history of migraines. You may need to take this medicine to prevent migraines or to stop a migraine that has started.      •Antinausea medicine may be needed to control your nausea.      •Medicine may be given to control the amount of acid your stomach makes.      •Take your medicine as directed. Contact your healthcare provider if you think your medicine is not helping or if you have side effects. Tell your provider if you are allergic to any medicine. Keep a list of the medicines, vitamins, and herbs you take. Include the amounts, and when and why you take them. Bring the list or the pill bottles to follow-up visits. Carry your medicine list with you in case of an emergency.      Prevent or manage episodes:   •Avoid triggers. Certain foods can trigger episodes, such as chocolate, cheese, and monosodium glutamate (MSG). Caffeine can also trigger an episode. Your healthcare provider or dietitian can help you identify foods that trigger an episode. This will help you create meal plans to avoid those triggers. Other triggers include too much exercise, motion sickness, or being in hot weather too long.      •Drink more liquids as directed. Vomiting can lead to dehydration. It is important to drink more liquids to help replace lost body fluids. Ask your healthcare provider how much liquid to drink each day and which liquids are best for you. Your provider may recommend that you drink an oral rehydration solution (ORS). ORS contains water, salts, and sugar that are needed to replace the lost body fluids. Ask what kind of ORS to use, how much to drink, and where to get it.      •Eat smaller meals, more often. Eat small amounts of food every 2 to 3 hours, even if you are not hungry. Food in your stomach may decrease your nausea.      •Control stress. Stress or anxiety can trigger an episode. Find ways to relax and manage your stress. Get more rest and sleep.       •Do not drink alcohol. Alcohol may upset or irritate your stomach. Too much alcohol can also cause nausea and vomiting.      •Do not use marijuana (cannabis). Repeated use of marijuana over a long period of time (chronic use) can cause episodes. This is called cannabis hyperemesis syndrome. If you have an episode caused by marijuana use, a hot shower may relieve your symptoms. Ask your healthcare provider for information if want to quit using marijuana and need help quitting.      Follow up with your doctor as directed: Write down your questions so you remember to ask them during your visits.

## 2023-02-06 NOTE — ED PROVIDER NOTE - PHYSICAL EXAMINATION
General: distressed and vomiting  HEENT: Normocephalic, atraumatic  Neck: No apparent stiffness or JVD  Pulm: Chest wall symmetric and nontender, lungs clear to ascultation   Cardiac: Regular rate and regular rhythm  Abdomen: Nontender and nondistended  Skin: Skin is warm, dry and intact without rashes or lesions.  Neuro: No motor or sensory deficits   MSK: No deformity or tenderness

## 2023-02-06 NOTE — ED PROVIDER NOTE - PROGRESS NOTE DETAILS
[place]: Patient reassessed after signout. Still complaining of nausea, but no longer vomiting. Rx sent to pharmacy.

## 2023-02-11 ENCOUNTER — EMERGENCY (EMERGENCY)
Facility: HOSPITAL | Age: 38
LOS: 1 days | Discharge: HOME | End: 2023-02-11
Attending: EMERGENCY MEDICINE
Payer: MEDICAID

## 2023-02-11 VITALS
DIASTOLIC BLOOD PRESSURE: 80 MMHG | TEMPERATURE: 99 F | WEIGHT: 149.91 LBS | RESPIRATION RATE: 18 BRPM | SYSTOLIC BLOOD PRESSURE: 125 MMHG | HEART RATE: 81 BPM | OXYGEN SATURATION: 97 %

## 2023-02-11 PROCEDURE — 99283 EMERGENCY DEPT VISIT LOW MDM: CPT

## 2023-02-11 PROCEDURE — 99284 EMERGENCY DEPT VISIT MOD MDM: CPT

## 2023-02-11 RX ORDER — IBUPROFEN 200 MG
800 TABLET ORAL ONCE
Refills: 0 | Status: COMPLETED | OUTPATIENT
Start: 2023-02-11 | End: 2023-02-11

## 2023-02-11 RX ADMIN — Medication 800 MILLIGRAM(S): at 18:19

## 2023-02-11 NOTE — ED PROVIDER NOTE - PATIENT PORTAL LINK FT
You can access the FollowMyHealth Patient Portal offered by [facility] by registering at the following website: [email or web address]. By joining [employer]’s FollowMyHealth portal, you will also be able to view your health information using other applications (apps) compatible with our system.

## 2023-02-11 NOTE — ED PROVIDER NOTE - NSFOLLOWUPINSTRUCTIONS_ED_ALL_ED_FT
Continue warm compresses as discussed   OTC pain medication for pain control   Decrease lifting, pulling, and straining with left forearm and arm.

## 2023-02-11 NOTE — ED PROVIDER NOTE - CLINICAL SUMMARY MEDICAL DECISION MAKING FREE TEXT BOX
37-year-old male presents the ED complaining of left forearm pain x6 days patient explained that he was in the ED x1 day ago and IV placement.  Patient explained that he noticed pain and swelling to his arm after leaving the ED.  Patient is fully noticed some bruising to his arm which has since got worse so he presented for evaluation. Patient is examination positive for left forearm ecchymosis.  Forearm slightly tender on palpation.  No swelling or firmness on palpation patient is able to range elbow shoulder wrist with some discomfort.  Patient treated in ED with pain meds and DC in stable condition patient will apply warm compress as discussed and follow-up with primary care physician

## 2023-02-11 NOTE — ED PROVIDER NOTE - ALCOHOL USE HISTORY SINGLE SELECT
[facility] Rooming Progress Note      1. Are you taking medication as instructed? Yes    2. Do you have any medication questions/concerns since your last visit?  No    3. Has your pain has been interfering with your mood causing any sadness or depression? Yes    4. Ask only if 9B on the BPI is 8 or greater and PHQ-9 has not been previously completed:  a.  Over the past 2 weeks, how many days have you had little interest or pleasure in doing things?  1-Less than half the days  b.  Over the past 2 weeks how many days have you been feeling down, depressed or hopeless? 1-Less than half the days    5. Did the patient bring a friend or family member with them into the room? No    6. If so, did the patient give explicit permission for the practitioner to discuss their healthcare in front of that friend/family member? N/A        PILL COUNT:  She is on lyrica for pain currently and it has been helpful.    yes...

## 2023-02-11 NOTE — ED PROVIDER NOTE - OBJECTIVE STATEMENT
37-year-old male presents the ED complaining of left forearm pain x6 days patient explained that he was in the ED x1 day ago and IV placement.  Patient explained that he noticed pain and swelling to his arm after leaving the ED.  Patient is fully noticed some bruising to his arm which has since got worse so he presented for evaluation. Pt denies any significant past medical or surgical illness.

## 2023-02-11 NOTE — ED PROVIDER NOTE - CPE EDP RESP NORM
Infusion Nursing Note:  Nadira Perez presents today for Zometa.    Patient seen by provider today: No   present during visit today: Not Applicable.    Note: Patient receiving Zometa for the first time.  Reviewed side effects and schedule with patient.  Patient stated that she has not started taking a calcium supplement because of her hypercalcemia history.  Reviewed patient with INGA Shah.    TORB INGA Shah/Carmela Patel RN at 1115    OK for patient to not start calcium at this time.  Reevaluate in 6 months.    Intravenous Access:  Peripheral IV placed.    Treatment Conditions:    Lab Results   Component Value Date    CR 0.52 11/29/2017                   Lab Results   Component Value Date    RAMBO 8.8 11/29/2017                Lab Results   Component Value Date    BILITOTAL 0.3 11/21/2017           Lab Results   Component Value Date    ALBUMIN 3.8 11/29/2017                        Post Infusion Assessment:  Patient tolerated infusion without incident.  Blood return noted pre and post infusion.  Site patent and intact, free from redness, edema or discomfort.  No evidence of extravasations.  Access discontinued per protocol.  Zometa stop time: 1128.    Discharge Plan:   Patient declined prescription refills.  Discharge instructions reviewed with: Patient.  Patient and/or family verbalized understanding of discharge instructions and all questions answered.  Copy of AVS reviewed with patient and/or family.  Patient will return 5/14/18 for next appointment.  Patient discharged in stable condition accompanied by: self.  Departure Mode: Ambulatory.    Carmela Patel RN                        Yes. Thank you.   normal...

## 2023-02-11 NOTE — ED PROVIDER NOTE - CROS ED ROS STATEMENT
The patient is a 44y Female complaining of see chief complaint quote. all other ROS negative except as per HPI

## 2023-08-15 NOTE — ED PROVIDER NOTE - CONSTITUTIONAL NEGATIVE STATEMENT, MLM
[] : no respiratory distress [Auscultation Breath Sounds / Voice Sounds] : lungs were clear to auscultation bilaterally [Heart Rate And Rhythm] : heart rate was normal and rhythm regular [Heart Sounds] : normal S1 and S2 [Heart Sounds Gallop] : no gallops [Murmurs] : no murmurs [Heart Sounds Pericardial Friction Rub] : no pericardial rub [Examination Of The Chest] : the chest was normal in appearance [Chest Visual Inspection Thoracic Asymmetry] : no chest asymmetry [Diminished Respiratory Excursion] : normal chest expansion no fever and no chills.

## 2023-10-10 ENCOUNTER — EMERGENCY (EMERGENCY)
Facility: HOSPITAL | Age: 38
LOS: 1 days | Discharge: HOME | End: 2023-10-10
Attending: EMERGENCY MEDICINE
Payer: MEDICAID

## 2023-10-10 VITALS
TEMPERATURE: 98 F | OXYGEN SATURATION: 99 % | RESPIRATION RATE: 18 BRPM | HEART RATE: 79 BPM | HEIGHT: 71 IN | DIASTOLIC BLOOD PRESSURE: 73 MMHG | WEIGHT: 169.98 LBS | SYSTOLIC BLOOD PRESSURE: 116 MMHG

## 2023-10-10 VITALS
DIASTOLIC BLOOD PRESSURE: 75 MMHG | HEART RATE: 76 BPM | RESPIRATION RATE: 19 BRPM | OXYGEN SATURATION: 99 % | SYSTOLIC BLOOD PRESSURE: 142 MMHG

## 2023-10-10 LAB
ALBUMIN SERPL ELPH-MCNC: 4.7 G/DL — SIGNIFICANT CHANGE UP (ref 3.3–5.2)
ALP SERPL-CCNC: 71 U/L — SIGNIFICANT CHANGE UP (ref 40–120)
ALT FLD-CCNC: 14 U/L — SIGNIFICANT CHANGE UP
ANION GAP SERPL CALC-SCNC: 12 MMOL/L — SIGNIFICANT CHANGE UP (ref 5–17)
APAP SERPL-MCNC: <3 UG/ML — LOW (ref 10–26)
AST SERPL-CCNC: 19 U/L — SIGNIFICANT CHANGE UP
BASOPHILS # BLD AUTO: 0.04 K/UL — SIGNIFICANT CHANGE UP (ref 0–0.2)
BASOPHILS NFR BLD AUTO: 0.5 % — SIGNIFICANT CHANGE UP (ref 0–2)
BILIRUB SERPL-MCNC: 0.4 MG/DL — SIGNIFICANT CHANGE UP (ref 0.4–2)
BUN SERPL-MCNC: 10.8 MG/DL — SIGNIFICANT CHANGE UP (ref 8–20)
CALCIUM SERPL-MCNC: 9.5 MG/DL — SIGNIFICANT CHANGE UP (ref 8.4–10.5)
CHLORIDE SERPL-SCNC: 103 MMOL/L — SIGNIFICANT CHANGE UP (ref 96–108)
CO2 SERPL-SCNC: 26 MMOL/L — SIGNIFICANT CHANGE UP (ref 22–29)
CREAT SERPL-MCNC: 0.74 MG/DL — SIGNIFICANT CHANGE UP (ref 0.5–1.3)
EGFR: 120 ML/MIN/1.73M2 — SIGNIFICANT CHANGE UP
EOSINOPHIL # BLD AUTO: 0.13 K/UL — SIGNIFICANT CHANGE UP (ref 0–0.5)
EOSINOPHIL NFR BLD AUTO: 1.6 % — SIGNIFICANT CHANGE UP (ref 0–6)
ETHANOL SERPL-MCNC: <10 MG/DL — SIGNIFICANT CHANGE UP (ref 0–9)
FLUAV AG NPH QL: SIGNIFICANT CHANGE UP
FLUBV AG NPH QL: SIGNIFICANT CHANGE UP
GLUCOSE SERPL-MCNC: 100 MG/DL — HIGH (ref 70–99)
HCT VFR BLD CALC: 40.8 % — SIGNIFICANT CHANGE UP (ref 39–50)
HGB BLD-MCNC: 14.8 G/DL — SIGNIFICANT CHANGE UP (ref 13–17)
IMM GRANULOCYTES NFR BLD AUTO: 0.3 % — SIGNIFICANT CHANGE UP (ref 0–0.9)
LYMPHOCYTES # BLD AUTO: 1.14 K/UL — SIGNIFICANT CHANGE UP (ref 1–3.3)
LYMPHOCYTES # BLD AUTO: 14.3 % — SIGNIFICANT CHANGE UP (ref 13–44)
MCHC RBC-ENTMCNC: 34.8 PG — HIGH (ref 27–34)
MCHC RBC-ENTMCNC: 36.3 GM/DL — HIGH (ref 32–36)
MCV RBC AUTO: 96 FL — SIGNIFICANT CHANGE UP (ref 80–100)
MONOCYTES # BLD AUTO: 0.5 K/UL — SIGNIFICANT CHANGE UP (ref 0–0.9)
MONOCYTES NFR BLD AUTO: 6.3 % — SIGNIFICANT CHANGE UP (ref 2–14)
NEUTROPHILS # BLD AUTO: 6.17 K/UL — SIGNIFICANT CHANGE UP (ref 1.8–7.4)
NEUTROPHILS NFR BLD AUTO: 77 % — SIGNIFICANT CHANGE UP (ref 43–77)
PLATELET # BLD AUTO: 256 K/UL — SIGNIFICANT CHANGE UP (ref 150–400)
POTASSIUM SERPL-MCNC: 4.2 MMOL/L — SIGNIFICANT CHANGE UP (ref 3.5–5.3)
POTASSIUM SERPL-SCNC: 4.2 MMOL/L — SIGNIFICANT CHANGE UP (ref 3.5–5.3)
PROT SERPL-MCNC: 6.8 G/DL — SIGNIFICANT CHANGE UP (ref 6.6–8.7)
RBC # BLD: 4.25 M/UL — SIGNIFICANT CHANGE UP (ref 4.2–5.8)
RBC # FLD: 11.9 % — SIGNIFICANT CHANGE UP (ref 10.3–14.5)
RSV RNA NPH QL NAA+NON-PROBE: SIGNIFICANT CHANGE UP
SALICYLATES SERPL-MCNC: <0.6 MG/DL — LOW (ref 10–20)
SARS-COV-2 RNA SPEC QL NAA+PROBE: SIGNIFICANT CHANGE UP
SODIUM SERPL-SCNC: 141 MMOL/L — SIGNIFICANT CHANGE UP (ref 135–145)
WBC # BLD: 8 K/UL — SIGNIFICANT CHANGE UP (ref 3.8–10.5)
WBC # FLD AUTO: 8 K/UL — SIGNIFICANT CHANGE UP (ref 3.8–10.5)

## 2023-10-10 PROCEDURE — 96374 THER/PROPH/DIAG INJ IV PUSH: CPT

## 2023-10-10 PROCEDURE — 36415 COLL VENOUS BLD VENIPUNCTURE: CPT

## 2023-10-10 PROCEDURE — 93005 ELECTROCARDIOGRAM TRACING: CPT

## 2023-10-10 PROCEDURE — 99285 EMERGENCY DEPT VISIT HI MDM: CPT

## 2023-10-10 PROCEDURE — 93010 ELECTROCARDIOGRAM REPORT: CPT

## 2023-10-10 PROCEDURE — 80053 COMPREHEN METABOLIC PANEL: CPT

## 2023-10-10 PROCEDURE — 80307 DRUG TEST PRSMV CHEM ANLYZR: CPT

## 2023-10-10 PROCEDURE — 87637 SARSCOV2&INF A&B&RSV AMP PRB: CPT

## 2023-10-10 PROCEDURE — 99284 EMERGENCY DEPT VISIT MOD MDM: CPT | Mod: 25

## 2023-10-10 PROCEDURE — 85025 COMPLETE CBC W/AUTO DIFF WBC: CPT

## 2023-10-10 RX ORDER — THIAMINE MONONITRATE (VIT B1) 100 MG
100 TABLET ORAL ONCE
Refills: 0 | Status: COMPLETED | OUTPATIENT
Start: 2023-10-10 | End: 2023-10-10

## 2023-10-10 RX ORDER — DIAZEPAM 5 MG
10 TABLET ORAL
Refills: 0 | Status: DISCONTINUED | OUTPATIENT
Start: 2023-10-10 | End: 2023-10-10

## 2023-10-10 RX ORDER — SODIUM CHLORIDE 9 MG/ML
1000 INJECTION INTRAMUSCULAR; INTRAVENOUS; SUBCUTANEOUS ONCE
Refills: 0 | Status: COMPLETED | OUTPATIENT
Start: 2023-10-10 | End: 2023-10-10

## 2023-10-10 RX ADMIN — Medication 100 MILLIGRAM(S): at 14:40

## 2023-10-10 RX ADMIN — SODIUM CHLORIDE 1000 MILLILITER(S): 9 INJECTION INTRAMUSCULAR; INTRAVENOUS; SUBCUTANEOUS at 14:44

## 2023-10-10 RX ADMIN — Medication 10 MILLIGRAM(S): at 14:41

## 2023-10-10 RX ADMIN — Medication 100 MILLIGRAM(S): at 17:56

## 2023-10-10 RX ADMIN — Medication 50 MILLIGRAM(S): at 14:40

## 2023-10-10 NOTE — SBIRT NOTE ADULT - NSSBIRTALCPASSREFTXDET_GEN_A_CORE
: Full screen positive. Referral to Treatment attempted. Screening results were   reviewed with the patient and patient was provided information about healthy guidelines and potential negative   consequences associated with level of risk. Motivation and readiness to reduce or stop use was discussed and   goals and activities to make changes were suggested/offered.  Options discussed for further evaluation and treatment, but referral to treatment was not completed because  ? Patient refused

## 2023-10-10 NOTE — ED PROVIDER NOTE - OBJECTIVE STATEMENT
38-year-old male history of polysubstance abuse  wants detox.  Patient called his insurance is unable to get detox until November 1.  Patient report using 4 mg of Xanax daily and drinking 1 bottle of tequila daily.  Last used 3 days ago.  Patient also report using cocaine 2 days ago to "counteract the effects of withdrawal" as well as intramuscular ketamine.   patient also is a chornic cigarette smoker and smokes marijuanna (last use yesterday). She denies suicidal ideation.  But he does report feeling depressed and helpless.  No auditory or visual hallucination.  Patient report unable to sleep for the past couple of days and unable to "function ".

## 2023-10-10 NOTE — ED ADULT TRIAGE NOTE - GLASGOW COMA SCALE: SCORE, MLM
15

## 2023-10-10 NOTE — ED PROVIDER NOTE - CLINICAL SUMMARY MEDICAL DECISION MAKING FREE TEXT BOX
38-year-old male history of polysubstance abuse  wants detox. Patient report using 4 mg of Xanax daily and drinking 1 bottle of tequila daily.  Last used 3 days ago.  Patient also report using cocaine 2 days ago to "counteract the effects of withdrawal" as well as intramuscular ketamine.   patient also is a chornic cigarette smoker and smokes marijuanna (last use yesterday).  We will get a CIWA score, Librium and Valium for alcohol withdrawal.  EKG, blood work, IV fluids, thiamine.  Will need SW and SBIRT for detox placement. 38-year-old male history of polysubstance abuse  wants detox. Patient report using 4 mg of Xanax daily and drinking 1 bottle of tequila daily.  Last used 3 days ago.  Patient also report using cocaine 2 days ago to "counteract the effects of withdrawal" as well as intramuscular ketamine.   patient also is a chornic cigarette smoker and smokes marijuanna (last use yesterday).  We will get a CIWA score, Librium and Valium for alcohol withdrawal.  EKG, blood work, IV fluids, thiamine.      Lab values do not require emergent intervention. CIWA 11 which improved to 1 after Librium 50 and Valium 10 mg IV.  Patient seen by SBIRT,  patient declined to go to detox.  Patient report that he needs to work.  Symptoms improved after benzos.  Will give Librium 100 mg p.o. prior to discharge.

## 2023-10-10 NOTE — ED ADULT NURSE NOTE - OBJECTIVE STATEMENT
Aox4. Pt presenting to Emergency Department requesting detox. Pt reports drinking alcohol everyday along with using xanax. Pt reports last drink 36 hours ago. Pt complaining of headache, shakes and nausea. LONDON performed. MD KOWALSKI notified. Respirations even and unlabored. Skin warm to touch.

## 2023-10-10 NOTE — ED ADULT TRIAGE NOTE - CHIEF COMPLAINT QUOTE
requesting detox. pt states " I cant get into a detox facility until 11/1, I am detoxing from alcohol and xanax". last drink and last use was 4 days ago. c/o muscle spasms. denies si/hi/auditory or visual hallucinations

## 2023-10-10 NOTE — ED PROVIDER NOTE - PATIENT PORTAL LINK FT
You can access the FollowMyHealth Patient Portal offered by [facility] by registering at the following website: [email or web address]. By joining [employer]’s FollowMyHealth portal, you will also be able to view your health information using other applications (apps) compatible with our system.

## 2023-10-10 NOTE — ED ADULT TRIAGE NOTE - HEIGHT IN CM
Patient states she is experiencing weakness and always feeling tired. Patient also states she hasn't been able to  anything with her left hand. Please contact patient at [phone]   [phone]

## 2023-10-10 NOTE — ED PROVIDER NOTE - NS ED ROS FT
CONSTITUTIONAL - no  fever, no diaphoresis, no weight change (+) chills   SKIN - no rash  HEMATOLOGIC - no bleeding, no bruising  EYES - no eye pain, no blurred vision  ENT - no change in hearing, no pain  RESPIRATORY - no shortness of breath, no cough  CARDIAC - no chest pain, no palpitations  GI - no abd pain, no nausea, no vomiting, no diarrhea, no constipation, no bleeding  GENITO-URINARY - no discharge, no dysuria; no hematuria,   ENDO - no polydipsia, no polyuria, no heat/no cold intolerance  MUSCULOSKELETAL - no joint pain, no swelling, no redness  NEUROLOGIC - no weakness, no headache, no anesthesia, no paresthesias (+) insomnia  PSYCH - no anxiety, non suicidal, non homicidal, no hallucination, (+) depression

## 2023-10-10 NOTE — ED PROVIDER NOTE - PHYSICAL EXAMINATION
VITAL SIGNS: I have reviewed nursing notes and confirm.  CONSTITUTIONAL:  (+) mild tremulous   SKIN: Skin exam is warm and dry, no acute rash.  HEAD: Normocephalic; atraumatic.  EYES: PERRL, EOM intact; conjunctiva and sclera clear.  ENT: No nasal discharge; airway clear. Throat clear.  NECK: Supple; non tender.    CARD: Regular rate and rhythm.  RESP: No wheezes,  no rales or rhonchi.   ABD:  soft; non-distended; non-tender;   EXT: Normal ROM. No clubbing, cyanosis or edema.  NEURO: Alert, oriented. Grossly unremarkable. No focal deficits.  moves all extremities,   PSYCH: Cooperative,

## 2023-10-10 NOTE — SBIRT NOTE ADULT - NSSBIRTDRGPASSREFTXDET_GEN_A_CORE
: Full screen positive. Referral to Treatment attempted. Screening results were   reviewed with the patient and patient was provided information about healthy guidelines and potential negative   consequences associated with level of risk. Motivation and readiness to reduce or stop use was discussed and   goals and activities to make changes were suggested/offered.  Options discussed for further evaluation and treatment, but referral to treatment was not completed because  Patient refused

## 2023-10-15 ENCOUNTER — EMERGENCY (EMERGENCY)
Facility: HOSPITAL | Age: 38
LOS: 1 days | Discharge: HOME | End: 2023-10-15
Attending: EMERGENCY MEDICINE
Payer: MEDICAID

## 2023-10-15 VITALS
HEIGHT: 71 IN | DIASTOLIC BLOOD PRESSURE: 76 MMHG | TEMPERATURE: 98 F | HEART RATE: 75 BPM | RESPIRATION RATE: 20 BRPM | WEIGHT: 149.91 LBS | OXYGEN SATURATION: 98 % | SYSTOLIC BLOOD PRESSURE: 131 MMHG

## 2023-10-15 VITALS
RESPIRATION RATE: 20 BRPM | OXYGEN SATURATION: 98 % | TEMPERATURE: 99 F | HEART RATE: 77 BPM | DIASTOLIC BLOOD PRESSURE: 78 MMHG | SYSTOLIC BLOOD PRESSURE: 150 MMHG

## 2023-10-15 LAB
ALBUMIN SERPL ELPH-MCNC: 4.4 G/DL — SIGNIFICANT CHANGE UP (ref 3.3–5.2)
ALP SERPL-CCNC: 72 U/L — SIGNIFICANT CHANGE UP (ref 40–120)
ALT FLD-CCNC: 26 U/L — SIGNIFICANT CHANGE UP
ANION GAP SERPL CALC-SCNC: 16 MMOL/L — SIGNIFICANT CHANGE UP (ref 5–17)
AST SERPL-CCNC: 37 U/L — SIGNIFICANT CHANGE UP
BASOPHILS # BLD AUTO: 0.04 K/UL — SIGNIFICANT CHANGE UP (ref 0–0.2)
BASOPHILS NFR BLD AUTO: 0.5 % — SIGNIFICANT CHANGE UP (ref 0–2)
BILIRUB SERPL-MCNC: 0.6 MG/DL — SIGNIFICANT CHANGE UP (ref 0.4–2)
BUN SERPL-MCNC: 11.9 MG/DL — SIGNIFICANT CHANGE UP (ref 8–20)
CALCIUM SERPL-MCNC: 9.5 MG/DL — SIGNIFICANT CHANGE UP (ref 8.4–10.5)
CHLORIDE SERPL-SCNC: 101 MMOL/L — SIGNIFICANT CHANGE UP (ref 96–108)
CO2 SERPL-SCNC: 23 MMOL/L — SIGNIFICANT CHANGE UP (ref 22–29)
CREAT SERPL-MCNC: 0.9 MG/DL — SIGNIFICANT CHANGE UP (ref 0.5–1.3)
EGFR: 113 ML/MIN/1.73M2 — SIGNIFICANT CHANGE UP
EOSINOPHIL # BLD AUTO: 0.19 K/UL — SIGNIFICANT CHANGE UP (ref 0–0.5)
EOSINOPHIL NFR BLD AUTO: 2.2 % — SIGNIFICANT CHANGE UP (ref 0–6)
ETHANOL SERPL-MCNC: <10 MG/DL — SIGNIFICANT CHANGE UP (ref 0–9)
FLUAV AG NPH QL: SIGNIFICANT CHANGE UP
FLUBV AG NPH QL: SIGNIFICANT CHANGE UP
GLUCOSE SERPL-MCNC: 121 MG/DL — HIGH (ref 70–99)
HCT VFR BLD CALC: 38.6 % — LOW (ref 39–50)
HGB BLD-MCNC: 13.7 G/DL — SIGNIFICANT CHANGE UP (ref 13–17)
IMM GRANULOCYTES NFR BLD AUTO: 0.2 % — SIGNIFICANT CHANGE UP (ref 0–0.9)
LIDOCAIN IGE QN: 25 U/L — SIGNIFICANT CHANGE UP (ref 22–51)
LYMPHOCYTES # BLD AUTO: 1.93 K/UL — SIGNIFICANT CHANGE UP (ref 1–3.3)
LYMPHOCYTES # BLD AUTO: 22.2 % — SIGNIFICANT CHANGE UP (ref 13–44)
MCHC RBC-ENTMCNC: 34 PG — SIGNIFICANT CHANGE UP (ref 27–34)
MCHC RBC-ENTMCNC: 35.5 GM/DL — SIGNIFICANT CHANGE UP (ref 32–36)
MCV RBC AUTO: 95.8 FL — SIGNIFICANT CHANGE UP (ref 80–100)
MONOCYTES # BLD AUTO: 0.93 K/UL — HIGH (ref 0–0.9)
MONOCYTES NFR BLD AUTO: 10.7 % — SIGNIFICANT CHANGE UP (ref 2–14)
NEUTROPHILS # BLD AUTO: 5.57 K/UL — SIGNIFICANT CHANGE UP (ref 1.8–7.4)
NEUTROPHILS NFR BLD AUTO: 64.2 % — SIGNIFICANT CHANGE UP (ref 43–77)
PLATELET # BLD AUTO: 288 K/UL — SIGNIFICANT CHANGE UP (ref 150–400)
POTASSIUM SERPL-MCNC: 3.5 MMOL/L — SIGNIFICANT CHANGE UP (ref 3.5–5.3)
POTASSIUM SERPL-SCNC: 3.5 MMOL/L — SIGNIFICANT CHANGE UP (ref 3.5–5.3)
PROT SERPL-MCNC: 6.8 G/DL — SIGNIFICANT CHANGE UP (ref 6.6–8.7)
RBC # BLD: 4.03 M/UL — LOW (ref 4.2–5.8)
RBC # FLD: 11.9 % — SIGNIFICANT CHANGE UP (ref 10.3–14.5)
RSV RNA NPH QL NAA+NON-PROBE: SIGNIFICANT CHANGE UP
SARS-COV-2 RNA SPEC QL NAA+PROBE: SIGNIFICANT CHANGE UP
SODIUM SERPL-SCNC: 140 MMOL/L — SIGNIFICANT CHANGE UP (ref 135–145)
WBC # BLD: 8.68 K/UL — SIGNIFICANT CHANGE UP (ref 3.8–10.5)
WBC # FLD AUTO: 8.68 K/UL — SIGNIFICANT CHANGE UP (ref 3.8–10.5)

## 2023-10-15 PROCEDURE — 80307 DRUG TEST PRSMV CHEM ANLYZR: CPT

## 2023-10-15 PROCEDURE — 36415 COLL VENOUS BLD VENIPUNCTURE: CPT

## 2023-10-15 PROCEDURE — 83690 ASSAY OF LIPASE: CPT

## 2023-10-15 PROCEDURE — 96376 TX/PRO/DX INJ SAME DRUG ADON: CPT

## 2023-10-15 PROCEDURE — 96374 THER/PROPH/DIAG INJ IV PUSH: CPT

## 2023-10-15 PROCEDURE — 96375 TX/PRO/DX INJ NEW DRUG ADDON: CPT

## 2023-10-15 PROCEDURE — 99053 MED SERV 10PM-8AM 24 HR FAC: CPT

## 2023-10-15 PROCEDURE — 85025 COMPLETE CBC W/AUTO DIFF WBC: CPT

## 2023-10-15 PROCEDURE — 93010 ELECTROCARDIOGRAM REPORT: CPT

## 2023-10-15 PROCEDURE — 93005 ELECTROCARDIOGRAM TRACING: CPT

## 2023-10-15 PROCEDURE — 99284 EMERGENCY DEPT VISIT MOD MDM: CPT

## 2023-10-15 PROCEDURE — 99284 EMERGENCY DEPT VISIT MOD MDM: CPT | Mod: 25

## 2023-10-15 PROCEDURE — 87637 SARSCOV2&INF A&B&RSV AMP PRB: CPT

## 2023-10-15 PROCEDURE — 80053 COMPREHEN METABOLIC PANEL: CPT

## 2023-10-15 RX ORDER — ONDANSETRON 8 MG/1
4 TABLET, FILM COATED ORAL ONCE
Refills: 0 | Status: COMPLETED | OUTPATIENT
Start: 2023-10-15 | End: 2023-10-15

## 2023-10-15 RX ORDER — DIAZEPAM 5 MG
5 TABLET ORAL ONCE
Refills: 0 | Status: DISCONTINUED | OUTPATIENT
Start: 2023-10-15 | End: 2023-10-15

## 2023-10-15 RX ORDER — KETOROLAC TROMETHAMINE 30 MG/ML
15 SYRINGE (ML) INJECTION ONCE
Refills: 0 | Status: DISCONTINUED | OUTPATIENT
Start: 2023-10-15 | End: 2023-10-15

## 2023-10-15 RX ORDER — PROCHLORPERAZINE MALEATE 5 MG
10 TABLET ORAL ONCE
Refills: 0 | Status: COMPLETED | OUTPATIENT
Start: 2023-10-15 | End: 2023-10-15

## 2023-10-15 RX ORDER — DIPHENHYDRAMINE HCL 50 MG
50 CAPSULE ORAL ONCE
Refills: 0 | Status: COMPLETED | OUTPATIENT
Start: 2023-10-15 | End: 2023-10-15

## 2023-10-15 RX ADMIN — Medication 15 MILLIGRAM(S): at 09:56

## 2023-10-15 RX ADMIN — Medication 25 MILLIGRAM(S): at 09:56

## 2023-10-15 RX ADMIN — Medication 50 MILLIGRAM(S): at 06:22

## 2023-10-15 RX ADMIN — ONDANSETRON 4 MILLIGRAM(S): 8 TABLET, FILM COATED ORAL at 06:23

## 2023-10-15 RX ADMIN — Medication 5 MILLIGRAM(S): at 06:21

## 2023-10-15 RX ADMIN — Medication 10 MILLIGRAM(S): at 06:21

## 2023-10-15 RX ADMIN — ONDANSETRON 4 MILLIGRAM(S): 8 TABLET, FILM COATED ORAL at 09:56

## 2023-10-15 RX ADMIN — Medication 15 MILLIGRAM(S): at 10:05

## 2023-10-15 NOTE — ED ADULT NURSE REASSESSMENT NOTE - NS ED NURSE REASSESS COMMENT FT1
pt in no apparent distress asleep. denies any pain at the moment. AOX4 ambulatory to the bathroom without difficulty.

## 2023-10-15 NOTE — ED ADULT NURSE REASSESSMENT NOTE - NS ED NURSE REASSESS COMMENT FT1
Patient received at 0730; awake; alert and oriented x4. Denies SOB, dizziness. No distress noted. VSS. Respirations unlabored. Report received at bedside. Cardiac monitor in place. Rhythm reviewed and report given to monitor tech. Call bell and personal items in reach. Continue to monitor patient and maintain safety.

## 2023-10-15 NOTE — ED PROVIDER NOTE - PATIENT PORTAL LINK FT
You can access the FollowMyHealth Patient Portal offered by [facility] by registering at the following website: [email or web address]. By joining [employer]’s FollowMyHealth portal, you will also be able to view your health information using other applications (apps) compatible with our system.

## 2023-10-15 NOTE — ED PROVIDER NOTE - CLINICAL SUMMARY MEDICAL DECISION MAKING FREE TEXT BOX
Patient is a 37 yo male with PMHx polysubstance abuse presenting with back pain. VSS, ambulatory      Will check labs, r.o pancreatitis, r.o electrolyte abnormalities, control pain and nausea as well as withdrawal sx, reassess.

## 2023-10-15 NOTE — ED PROVIDER NOTE - PROGRESS NOTE DETAILS
Lopez: Pt is awake, alert, lucid and coherent. Ambulating with steady gait, no complaints, no sign of trauma. No clinical sign of intox or withdrawal. Stable for dc.

## 2023-10-15 NOTE — ED PROVIDER NOTE - ATTENDING CONTRIBUTION TO CARE
38-year-old male with history of polysubstance abuse including Xanax and alcohol daily presents to the ED complaining of  upper back pain  after finding himself on the floor this morning.  Patient seen in ED 4 days ago requesting detox for benzodiazepine abuse.  Patient states he is withdrawing at this time complaining of increased nausea and vomiting.  Patient verbally abusive initially.  No obvious tremors.  Will check labs medicate for nausea vomiting give IV benzos observe and reevaluate

## 2023-10-15 NOTE — ED PROVIDER NOTE - PHYSICAL EXAMINATION
Gen: Agitated, uncomfortable, restless   Head: normocephalic, atraumatic  Lung: CTAB, no respiratory distress, no wheezing, rales, rhonchi  CV: normal s1/s2, rrr,   Abd: soft, non-tender, non-distended  MSK: No edema, no visible deformities, full range of motion in all 4 extremities  Neuro: No focal neurologic deficits  Skin: No rash

## 2023-10-15 NOTE — ED PROVIDER NOTE - OBJECTIVE STATEMENT
Patient is a 39 yo male with PMHx polysubstance abuse presenting with back pain. Patient states he is withdrawing from xanax and alcohol; patient has never had withdrawal seizures. Patient was at [facility] recently for similar and requesting detox however left without waiting for SBIRT/detox referral. States he found himself on the floor and is having back pain. Denies fevers, chills, dizziness, lightheadedness, dysphagia, dysarthria, diplopia, photophobia, syncope, cough, congestion, SOB, CP, abdominal pain, neck pain, diarrhea, dysuria, hematuria, hematochezia, hematemesis, recent travel, sick contacts, leg swelling.

## 2023-10-15 NOTE — ED ADULT TRIAGE NOTE - CHIEF COMPLAINT QUOTE
Pt comes in complaining of nausea/vomiting and right upper back pain, the pt states that he woke up on the floor with extreme back pain, the pt states that he is probably withdrawing since he drinks about a pint per day, the pt states that he took benzo's/alcohol 36 hours ago, the pt also states that he has recently used Ketamine from the street, the pt states that he has a history of seizures and denies taking anything for the pain, pt denies SI/HI

## 2023-10-17 ENCOUNTER — EMERGENCY (EMERGENCY)
Facility: HOSPITAL | Age: 38
LOS: 1 days | Discharge: LEFT BEFORE DISCHARGE | End: 2023-10-17
Attending: STUDENT IN AN ORGANIZED HEALTH CARE EDUCATION/TRAINING PROGRAM
Payer: MEDICAID

## 2023-10-17 ENCOUNTER — EMERGENCY (EMERGENCY)
Facility: HOSPITAL | Age: 38
LOS: 1 days | Discharge: HOME | End: 2023-10-17
Attending: EMERGENCY MEDICINE
Payer: MEDICAID

## 2023-10-17 VITALS
TEMPERATURE: 98 F | HEART RATE: 76 BPM | OXYGEN SATURATION: 100 % | DIASTOLIC BLOOD PRESSURE: 70 MMHG | SYSTOLIC BLOOD PRESSURE: 111 MMHG | RESPIRATION RATE: 16 BRPM

## 2023-10-17 VITALS
RESPIRATION RATE: 28 BRPM | SYSTOLIC BLOOD PRESSURE: 142 MMHG | HEIGHT: 71 IN | OXYGEN SATURATION: 100 % | TEMPERATURE: 98 F | HEART RATE: 90 BPM | WEIGHT: 169.98 LBS | DIASTOLIC BLOOD PRESSURE: 81 MMHG

## 2023-10-17 VITALS
DIASTOLIC BLOOD PRESSURE: 92 MMHG | SYSTOLIC BLOOD PRESSURE: 139 MMHG | TEMPERATURE: 98 F | OXYGEN SATURATION: 98 % | HEART RATE: 92 BPM | HEIGHT: 71 IN | RESPIRATION RATE: 20 BRPM

## 2023-10-17 DIAGNOSIS — Z79.899 OTHER LONG TERM (CURRENT) DRUG THERAPY: ICD-10-CM

## 2023-10-17 DIAGNOSIS — Z88.0 ALLERGY STATUS TO PENICILLIN: ICD-10-CM

## 2023-10-17 DIAGNOSIS — R11.2 NAUSEA WITH VOMITING, UNSPECIFIED: ICD-10-CM

## 2023-10-17 DIAGNOSIS — R10.13 EPIGASTRIC PAIN: ICD-10-CM

## 2023-10-17 LAB
ALBUMIN SERPL ELPH-MCNC: 4.5 G/DL — SIGNIFICANT CHANGE UP (ref 3.3–5.2)
ALBUMIN SERPL ELPH-MCNC: 4.5 G/DL — SIGNIFICANT CHANGE UP (ref 3.3–5.2)
ALBUMIN SERPL ELPH-MCNC: 4.6 G/DL — SIGNIFICANT CHANGE UP (ref 3.3–5.2)
ALBUMIN SERPL ELPH-MCNC: 4.6 G/DL — SIGNIFICANT CHANGE UP (ref 3.3–5.2)
ALP SERPL-CCNC: 73 U/L — SIGNIFICANT CHANGE UP (ref 40–120)
ALP SERPL-CCNC: 73 U/L — SIGNIFICANT CHANGE UP (ref 40–120)
ALP SERPL-CCNC: 75 U/L — SIGNIFICANT CHANGE UP (ref 40–120)
ALP SERPL-CCNC: 75 U/L — SIGNIFICANT CHANGE UP (ref 40–120)
ALT FLD-CCNC: 27 U/L — SIGNIFICANT CHANGE UP
ALT FLD-CCNC: 27 U/L — SIGNIFICANT CHANGE UP
ALT FLD-CCNC: 32 U/L — SIGNIFICANT CHANGE UP
ALT FLD-CCNC: 32 U/L — SIGNIFICANT CHANGE UP
AMPHET UR-MCNC: NEGATIVE — SIGNIFICANT CHANGE UP
AMPHET UR-MCNC: NEGATIVE — SIGNIFICANT CHANGE UP
ANION GAP SERPL CALC-SCNC: 18 MMOL/L — HIGH (ref 5–17)
ANION GAP SERPL CALC-SCNC: 18 MMOL/L — HIGH (ref 5–17)
ANION GAP SERPL CALC-SCNC: 20 MMOL/L — HIGH (ref 5–17)
ANION GAP SERPL CALC-SCNC: 20 MMOL/L — HIGH (ref 5–17)
APPEARANCE UR: CLEAR — SIGNIFICANT CHANGE UP
APPEARANCE UR: CLEAR — SIGNIFICANT CHANGE UP
APTT BLD: 30.9 SEC — SIGNIFICANT CHANGE UP (ref 24.5–35.6)
APTT BLD: 30.9 SEC — SIGNIFICANT CHANGE UP (ref 24.5–35.6)
AST SERPL-CCNC: 28 U/L — SIGNIFICANT CHANGE UP
AST SERPL-CCNC: 28 U/L — SIGNIFICANT CHANGE UP
AST SERPL-CCNC: 34 U/L — SIGNIFICANT CHANGE UP
AST SERPL-CCNC: 34 U/L — SIGNIFICANT CHANGE UP
BACTERIA # UR AUTO: ABNORMAL
BACTERIA # UR AUTO: ABNORMAL
BARBITURATES UR SCN-MCNC: NEGATIVE — SIGNIFICANT CHANGE UP
BARBITURATES UR SCN-MCNC: NEGATIVE — SIGNIFICANT CHANGE UP
BASE EXCESS BLDV CALC-SCNC: 1.3 MMOL/L — SIGNIFICANT CHANGE UP (ref -2–3)
BASE EXCESS BLDV CALC-SCNC: 1.3 MMOL/L — SIGNIFICANT CHANGE UP (ref -2–3)
BASE EXCESS BLDV CALC-SCNC: 5 MMOL/L — HIGH (ref -2–3)
BASE EXCESS BLDV CALC-SCNC: 5 MMOL/L — HIGH (ref -2–3)
BASOPHILS # BLD AUTO: 0.22 K/UL — HIGH (ref 0–0.2)
BASOPHILS # BLD AUTO: 0.22 K/UL — HIGH (ref 0–0.2)
BASOPHILS NFR BLD AUTO: 1.7 % — SIGNIFICANT CHANGE UP (ref 0–2)
BASOPHILS NFR BLD AUTO: 1.7 % — SIGNIFICANT CHANGE UP (ref 0–2)
BENZODIAZ UR-MCNC: POSITIVE
BENZODIAZ UR-MCNC: POSITIVE
BILIRUB SERPL-MCNC: 0.4 MG/DL — SIGNIFICANT CHANGE UP (ref 0.4–2)
BILIRUB SERPL-MCNC: 0.4 MG/DL — SIGNIFICANT CHANGE UP (ref 0.4–2)
BILIRUB SERPL-MCNC: 0.6 MG/DL — SIGNIFICANT CHANGE UP (ref 0.4–2)
BILIRUB SERPL-MCNC: 0.6 MG/DL — SIGNIFICANT CHANGE UP (ref 0.4–2)
BILIRUB UR-MCNC: NEGATIVE — SIGNIFICANT CHANGE UP
BILIRUB UR-MCNC: NEGATIVE — SIGNIFICANT CHANGE UP
BUN SERPL-MCNC: 12.7 MG/DL — SIGNIFICANT CHANGE UP (ref 8–20)
BUN SERPL-MCNC: 12.7 MG/DL — SIGNIFICANT CHANGE UP (ref 8–20)
BUN SERPL-MCNC: 9.8 MG/DL — SIGNIFICANT CHANGE UP (ref 8–20)
BUN SERPL-MCNC: 9.8 MG/DL — SIGNIFICANT CHANGE UP (ref 8–20)
CA-I SERPL-SCNC: 1.04 MMOL/L — LOW (ref 1.15–1.33)
CA-I SERPL-SCNC: 1.04 MMOL/L — LOW (ref 1.15–1.33)
CA-I SERPL-SCNC: 1.09 MMOL/L — LOW (ref 1.15–1.33)
CA-I SERPL-SCNC: 1.09 MMOL/L — LOW (ref 1.15–1.33)
CALCIUM SERPL-MCNC: 9.2 MG/DL — SIGNIFICANT CHANGE UP (ref 8.4–10.5)
CALCIUM SERPL-MCNC: 9.2 MG/DL — SIGNIFICANT CHANGE UP (ref 8.4–10.5)
CALCIUM SERPL-MCNC: 9.3 MG/DL — SIGNIFICANT CHANGE UP (ref 8.4–10.5)
CALCIUM SERPL-MCNC: 9.3 MG/DL — SIGNIFICANT CHANGE UP (ref 8.4–10.5)
CHLORIDE BLDV-SCNC: 100 MMOL/L — SIGNIFICANT CHANGE UP (ref 96–108)
CHLORIDE BLDV-SCNC: 100 MMOL/L — SIGNIFICANT CHANGE UP (ref 96–108)
CHLORIDE BLDV-SCNC: 99 MMOL/L — SIGNIFICANT CHANGE UP (ref 96–108)
CHLORIDE BLDV-SCNC: 99 MMOL/L — SIGNIFICANT CHANGE UP (ref 96–108)
CHLORIDE SERPL-SCNC: 95 MMOL/L — LOW (ref 96–108)
CHLORIDE SERPL-SCNC: 95 MMOL/L — LOW (ref 96–108)
CHLORIDE SERPL-SCNC: 96 MMOL/L — SIGNIFICANT CHANGE UP (ref 96–108)
CHLORIDE SERPL-SCNC: 96 MMOL/L — SIGNIFICANT CHANGE UP (ref 96–108)
CO2 SERPL-SCNC: 21 MMOL/L — LOW (ref 22–29)
CO2 SERPL-SCNC: 21 MMOL/L — LOW (ref 22–29)
CO2 SERPL-SCNC: 23 MMOL/L — SIGNIFICANT CHANGE UP (ref 22–29)
CO2 SERPL-SCNC: 23 MMOL/L — SIGNIFICANT CHANGE UP (ref 22–29)
COCAINE METAB.OTHER UR-MCNC: POSITIVE
COCAINE METAB.OTHER UR-MCNC: POSITIVE
COLOR SPEC: YELLOW — SIGNIFICANT CHANGE UP
COLOR SPEC: YELLOW — SIGNIFICANT CHANGE UP
CREAT SERPL-MCNC: 0.76 MG/DL — SIGNIFICANT CHANGE UP (ref 0.5–1.3)
CREAT SERPL-MCNC: 0.76 MG/DL — SIGNIFICANT CHANGE UP (ref 0.5–1.3)
CREAT SERPL-MCNC: 0.8 MG/DL — SIGNIFICANT CHANGE UP (ref 0.5–1.3)
CREAT SERPL-MCNC: 0.8 MG/DL — SIGNIFICANT CHANGE UP (ref 0.5–1.3)
DIFF PNL FLD: NEGATIVE — SIGNIFICANT CHANGE UP
DIFF PNL FLD: NEGATIVE — SIGNIFICANT CHANGE UP
EGFR: 117 ML/MIN/1.73M2 — SIGNIFICANT CHANGE UP
EGFR: 117 ML/MIN/1.73M2 — SIGNIFICANT CHANGE UP
EGFR: 119 ML/MIN/1.73M2 — SIGNIFICANT CHANGE UP
EGFR: 119 ML/MIN/1.73M2 — SIGNIFICANT CHANGE UP
EOSINOPHIL # BLD AUTO: 0.12 K/UL — SIGNIFICANT CHANGE UP (ref 0–0.5)
EOSINOPHIL # BLD AUTO: 0.12 K/UL — SIGNIFICANT CHANGE UP (ref 0–0.5)
EOSINOPHIL NFR BLD AUTO: 0.9 % — SIGNIFICANT CHANGE UP (ref 0–6)
EOSINOPHIL NFR BLD AUTO: 0.9 % — SIGNIFICANT CHANGE UP (ref 0–6)
EPI CELLS # UR: NEGATIVE — SIGNIFICANT CHANGE UP
EPI CELLS # UR: NEGATIVE — SIGNIFICANT CHANGE UP
ETHANOL SERPL-MCNC: <10 MG/DL — SIGNIFICANT CHANGE UP (ref 0–9)
ETHANOL SERPL-MCNC: <10 MG/DL — SIGNIFICANT CHANGE UP (ref 0–9)
GAS PNL BLDV: 133 MMOL/L — LOW (ref 136–145)
GAS PNL BLDV: 133 MMOL/L — LOW (ref 136–145)
GAS PNL BLDV: 137 MMOL/L — SIGNIFICANT CHANGE UP (ref 136–145)
GAS PNL BLDV: 137 MMOL/L — SIGNIFICANT CHANGE UP (ref 136–145)
GAS PNL BLDV: SIGNIFICANT CHANGE UP
GLUCOSE BLDV-MCNC: 119 MG/DL — HIGH (ref 70–99)
GLUCOSE BLDV-MCNC: 119 MG/DL — HIGH (ref 70–99)
GLUCOSE BLDV-MCNC: 122 MG/DL — HIGH (ref 70–99)
GLUCOSE BLDV-MCNC: 122 MG/DL — HIGH (ref 70–99)
GLUCOSE SERPL-MCNC: 119 MG/DL — HIGH (ref 70–99)
GLUCOSE SERPL-MCNC: 119 MG/DL — HIGH (ref 70–99)
GLUCOSE SERPL-MCNC: 122 MG/DL — HIGH (ref 70–99)
GLUCOSE SERPL-MCNC: 122 MG/DL — HIGH (ref 70–99)
GLUCOSE UR QL: NEGATIVE MG/DL — SIGNIFICANT CHANGE UP
GLUCOSE UR QL: NEGATIVE MG/DL — SIGNIFICANT CHANGE UP
HCO3 BLDV-SCNC: 23 MMOL/L — SIGNIFICANT CHANGE UP (ref 22–29)
HCO3 BLDV-SCNC: 23 MMOL/L — SIGNIFICANT CHANGE UP (ref 22–29)
HCO3 BLDV-SCNC: 25 MMOL/L — SIGNIFICANT CHANGE UP (ref 22–29)
HCO3 BLDV-SCNC: 25 MMOL/L — SIGNIFICANT CHANGE UP (ref 22–29)
HCT VFR BLD CALC: 45.5 % — SIGNIFICANT CHANGE UP (ref 39–50)
HCT VFR BLD CALC: 45.5 % — SIGNIFICANT CHANGE UP (ref 39–50)
HCT VFR BLDA CALC: 44 % — SIGNIFICANT CHANGE UP
HGB BLD CALC-MCNC: 14.6 G/DL — SIGNIFICANT CHANGE UP (ref 12.6–17.4)
HGB BLD CALC-MCNC: 14.6 G/DL — SIGNIFICANT CHANGE UP (ref 12.6–17.4)
HGB BLD CALC-MCNC: 14.8 G/DL — SIGNIFICANT CHANGE UP (ref 12.6–17.4)
HGB BLD CALC-MCNC: 14.8 G/DL — SIGNIFICANT CHANGE UP (ref 12.6–17.4)
HGB BLD-MCNC: 15.3 G/DL — SIGNIFICANT CHANGE UP (ref 13–17)
HGB BLD-MCNC: 15.3 G/DL — SIGNIFICANT CHANGE UP (ref 13–17)
HIV 1 & 2 AB SERPL IA.RAPID: SIGNIFICANT CHANGE UP
HIV 1 & 2 AB SERPL IA.RAPID: SIGNIFICANT CHANGE UP
INR BLD: 1.1 RATIO — SIGNIFICANT CHANGE UP (ref 0.85–1.18)
INR BLD: 1.1 RATIO — SIGNIFICANT CHANGE UP (ref 0.85–1.18)
KETONES UR-MCNC: ABNORMAL
KETONES UR-MCNC: ABNORMAL
LACTATE BLDV-MCNC: 1.6 MMOL/L — SIGNIFICANT CHANGE UP (ref 0.5–2)
LACTATE BLDV-MCNC: 1.6 MMOL/L — SIGNIFICANT CHANGE UP (ref 0.5–2)
LACTATE BLDV-MCNC: 2.7 MMOL/L — HIGH (ref 0.5–2)
LACTATE BLDV-MCNC: 2.7 MMOL/L — HIGH (ref 0.5–2)
LEUKOCYTE ESTERASE UR-ACNC: NEGATIVE — SIGNIFICANT CHANGE UP
LEUKOCYTE ESTERASE UR-ACNC: NEGATIVE — SIGNIFICANT CHANGE UP
LIDOCAIN IGE QN: 26 U/L — SIGNIFICANT CHANGE UP (ref 22–51)
LIDOCAIN IGE QN: 26 U/L — SIGNIFICANT CHANGE UP (ref 22–51)
LYMPHOCYTES # BLD AUTO: 1.03 K/UL — SIGNIFICANT CHANGE UP (ref 1–3.3)
LYMPHOCYTES # BLD AUTO: 1.03 K/UL — SIGNIFICANT CHANGE UP (ref 1–3.3)
LYMPHOCYTES # BLD AUTO: 7.8 % — LOW (ref 13–44)
LYMPHOCYTES # BLD AUTO: 7.8 % — LOW (ref 13–44)
MANUAL SMEAR VERIFICATION: SIGNIFICANT CHANGE UP
MANUAL SMEAR VERIFICATION: SIGNIFICANT CHANGE UP
MCHC RBC-ENTMCNC: 33.6 GM/DL — SIGNIFICANT CHANGE UP (ref 32–36)
MCHC RBC-ENTMCNC: 33.6 GM/DL — SIGNIFICANT CHANGE UP (ref 32–36)
MCHC RBC-ENTMCNC: 35.1 PG — HIGH (ref 27–34)
MCHC RBC-ENTMCNC: 35.1 PG — HIGH (ref 27–34)
MCV RBC AUTO: 104.4 FL — HIGH (ref 80–100)
MCV RBC AUTO: 104.4 FL — HIGH (ref 80–100)
METHADONE UR-MCNC: NEGATIVE — SIGNIFICANT CHANGE UP
METHADONE UR-MCNC: NEGATIVE — SIGNIFICANT CHANGE UP
MONOCYTES # BLD AUTO: 0.46 K/UL — SIGNIFICANT CHANGE UP (ref 0–0.9)
MONOCYTES # BLD AUTO: 0.46 K/UL — SIGNIFICANT CHANGE UP (ref 0–0.9)
MONOCYTES NFR BLD AUTO: 3.5 % — SIGNIFICANT CHANGE UP (ref 2–14)
MONOCYTES NFR BLD AUTO: 3.5 % — SIGNIFICANT CHANGE UP (ref 2–14)
NEUTROPHILS # BLD AUTO: 11.11 K/UL — HIGH (ref 1.8–7.4)
NEUTROPHILS # BLD AUTO: 11.11 K/UL — HIGH (ref 1.8–7.4)
NEUTROPHILS NFR BLD AUTO: 84.4 % — HIGH (ref 43–77)
NEUTROPHILS NFR BLD AUTO: 84.4 % — HIGH (ref 43–77)
NITRITE UR-MCNC: NEGATIVE — SIGNIFICANT CHANGE UP
NITRITE UR-MCNC: NEGATIVE — SIGNIFICANT CHANGE UP
OPIATES UR-MCNC: POSITIVE
OPIATES UR-MCNC: POSITIVE
PCO2 BLDV: 22 MMHG — LOW (ref 42–55)
PCO2 BLDV: 22 MMHG — LOW (ref 42–55)
PCO2 BLDV: 24 MMHG — LOW (ref 42–55)
PCO2 BLDV: 24 MMHG — LOW (ref 42–55)
PCP SPEC-MCNC: SIGNIFICANT CHANGE UP
PCP SPEC-MCNC: SIGNIFICANT CHANGE UP
PCP UR-MCNC: NEGATIVE — SIGNIFICANT CHANGE UP
PCP UR-MCNC: NEGATIVE — SIGNIFICANT CHANGE UP
PH BLDV: 7.59 — HIGH (ref 7.32–7.43)
PH BLDV: 7.59 — HIGH (ref 7.32–7.43)
PH BLDV: 7.67 — CRITICAL HIGH (ref 7.32–7.43)
PH BLDV: 7.67 — CRITICAL HIGH (ref 7.32–7.43)
PH UR: 7 — SIGNIFICANT CHANGE UP (ref 5–8)
PH UR: 7 — SIGNIFICANT CHANGE UP (ref 5–8)
PLAT MORPH BLD: ABNORMAL
PLAT MORPH BLD: ABNORMAL
PLATELET # BLD AUTO: 279 K/UL — SIGNIFICANT CHANGE UP (ref 150–400)
PLATELET # BLD AUTO: 279 K/UL — SIGNIFICANT CHANGE UP (ref 150–400)
PO2 BLDV: 159 MMHG — HIGH (ref 25–45)
PO2 BLDV: 159 MMHG — HIGH (ref 25–45)
PO2 BLDV: 45 MMHG — SIGNIFICANT CHANGE UP (ref 25–45)
PO2 BLDV: 45 MMHG — SIGNIFICANT CHANGE UP (ref 25–45)
POTASSIUM BLDV-SCNC: 3.4 MMOL/L — LOW (ref 3.5–5.1)
POTASSIUM SERPL-MCNC: 3.4 MMOL/L — LOW (ref 3.5–5.3)
POTASSIUM SERPL-MCNC: 3.4 MMOL/L — LOW (ref 3.5–5.3)
POTASSIUM SERPL-MCNC: 3.5 MMOL/L — SIGNIFICANT CHANGE UP (ref 3.5–5.3)
POTASSIUM SERPL-MCNC: 3.5 MMOL/L — SIGNIFICANT CHANGE UP (ref 3.5–5.3)
POTASSIUM SERPL-SCNC: 3.4 MMOL/L — LOW (ref 3.5–5.3)
POTASSIUM SERPL-SCNC: 3.4 MMOL/L — LOW (ref 3.5–5.3)
POTASSIUM SERPL-SCNC: 3.5 MMOL/L — SIGNIFICANT CHANGE UP (ref 3.5–5.3)
POTASSIUM SERPL-SCNC: 3.5 MMOL/L — SIGNIFICANT CHANGE UP (ref 3.5–5.3)
PROT SERPL-MCNC: 6.9 G/DL — SIGNIFICANT CHANGE UP (ref 6.6–8.7)
PROT UR-MCNC: 15
PROT UR-MCNC: 15
PROT UR-MCNC: NEGATIVE — SIGNIFICANT CHANGE UP
PROT UR-MCNC: NEGATIVE — SIGNIFICANT CHANGE UP
PROTHROM AB SERPL-ACNC: 12.2 SEC — SIGNIFICANT CHANGE UP (ref 9.5–13)
PROTHROM AB SERPL-ACNC: 12.2 SEC — SIGNIFICANT CHANGE UP (ref 9.5–13)
RBC # BLD: 4.36 M/UL — SIGNIFICANT CHANGE UP (ref 4.2–5.8)
RBC # BLD: 4.36 M/UL — SIGNIFICANT CHANGE UP (ref 4.2–5.8)
RBC # FLD: 12 % — SIGNIFICANT CHANGE UP (ref 10.3–14.5)
RBC # FLD: 12 % — SIGNIFICANT CHANGE UP (ref 10.3–14.5)
RBC BLD AUTO: ABNORMAL
RBC BLD AUTO: ABNORMAL
RBC CASTS # UR COMP ASSIST: NEGATIVE /HPF — SIGNIFICANT CHANGE UP (ref 0–4)
RBC CASTS # UR COMP ASSIST: NEGATIVE /HPF — SIGNIFICANT CHANGE UP (ref 0–4)
SAO2 % BLDV: 100 % — SIGNIFICANT CHANGE UP
SAO2 % BLDV: 100 % — SIGNIFICANT CHANGE UP
SAO2 % BLDV: 86.1 % — SIGNIFICANT CHANGE UP
SAO2 % BLDV: 86.1 % — SIGNIFICANT CHANGE UP
SMUDGE CELLS # BLD: PRESENT — SIGNIFICANT CHANGE UP
SMUDGE CELLS # BLD: PRESENT — SIGNIFICANT CHANGE UP
SODIUM SERPL-SCNC: 136 MMOL/L — SIGNIFICANT CHANGE UP (ref 135–145)
SODIUM SERPL-SCNC: 136 MMOL/L — SIGNIFICANT CHANGE UP (ref 135–145)
SODIUM SERPL-SCNC: 137 MMOL/L — SIGNIFICANT CHANGE UP (ref 135–145)
SODIUM SERPL-SCNC: 137 MMOL/L — SIGNIFICANT CHANGE UP (ref 135–145)
SP GR SPEC: 1.01 — SIGNIFICANT CHANGE UP (ref 1.01–1.02)
SP GR SPEC: 1.01 — SIGNIFICANT CHANGE UP (ref 1.01–1.02)
THC UR QL: POSITIVE
THC UR QL: POSITIVE
TROPONIN T SERPL-MCNC: <0.01 NG/ML — SIGNIFICANT CHANGE UP (ref 0–0.06)
TROPONIN T SERPL-MCNC: <0.01 NG/ML — SIGNIFICANT CHANGE UP (ref 0–0.06)
UROBILINOGEN FLD QL: NEGATIVE MG/DL — SIGNIFICANT CHANGE UP
UROBILINOGEN FLD QL: NEGATIVE MG/DL — SIGNIFICANT CHANGE UP
VARIANT LYMPHS # BLD: 1.7 % — SIGNIFICANT CHANGE UP (ref 0–6)
VARIANT LYMPHS # BLD: 1.7 % — SIGNIFICANT CHANGE UP (ref 0–6)
WBC # BLD: 13.16 K/UL — HIGH (ref 3.8–10.5)
WBC # BLD: 13.16 K/UL — HIGH (ref 3.8–10.5)
WBC # FLD AUTO: 13.16 K/UL — HIGH (ref 3.8–10.5)
WBC # FLD AUTO: 13.16 K/UL — HIGH (ref 3.8–10.5)
WBC UR QL: SIGNIFICANT CHANGE UP /HPF (ref 0–5)
WBC UR QL: SIGNIFICANT CHANGE UP /HPF (ref 0–5)

## 2023-10-17 PROCEDURE — 99285 EMERGENCY DEPT VISIT HI MDM: CPT

## 2023-10-17 PROCEDURE — G1004: CPT

## 2023-10-17 PROCEDURE — 73600 X-RAY EXAM OF ANKLE: CPT | Mod: 26,LT

## 2023-10-17 PROCEDURE — 74177 CT ABD & PELVIS W/CONTRAST: CPT | Mod: 26,ME

## 2023-10-17 PROCEDURE — 93010 ELECTROCARDIOGRAM REPORT: CPT | Mod: 76

## 2023-10-17 PROCEDURE — 93010 ELECTROCARDIOGRAM REPORT: CPT

## 2023-10-17 PROCEDURE — 99053 MED SERV 10PM-8AM 24 HR FAC: CPT

## 2023-10-17 RX ORDER — ACETAMINOPHEN 500 MG
650 TABLET ORAL ONCE
Refills: 0 | Status: DISCONTINUED | OUTPATIENT
Start: 2023-10-17 | End: 2023-10-24

## 2023-10-17 RX ORDER — FAMOTIDINE 10 MG/ML
1 INJECTION INTRAVENOUS
Qty: 20 | Refills: 0
Start: 2023-10-17 | End: 2023-10-26

## 2023-10-17 RX ORDER — ONDANSETRON 8 MG/1
1 TABLET, FILM COATED ORAL
Qty: 12 | Refills: 0
Start: 2023-10-17 | End: 2023-10-19

## 2023-10-17 RX ORDER — MORPHINE SULFATE 50 MG/1
4 CAPSULE, EXTENDED RELEASE ORAL ONCE
Refills: 0 | Status: DISCONTINUED | OUTPATIENT
Start: 2023-10-17 | End: 2023-10-17

## 2023-10-17 RX ORDER — SODIUM CHLORIDE 9 MG/ML
2000 INJECTION INTRAMUSCULAR; INTRAVENOUS; SUBCUTANEOUS ONCE
Refills: 0 | Status: COMPLETED | OUTPATIENT
Start: 2023-10-17 | End: 2023-10-17

## 2023-10-17 RX ORDER — DIAZEPAM 5 MG
5 TABLET ORAL ONCE
Refills: 0 | Status: DISCONTINUED | OUTPATIENT
Start: 2023-10-17 | End: 2023-10-17

## 2023-10-17 RX ORDER — DIPHENHYDRAMINE HCL 50 MG
25 CAPSULE ORAL ONCE
Refills: 0 | Status: COMPLETED | OUTPATIENT
Start: 2023-10-17 | End: 2023-10-17

## 2023-10-17 RX ORDER — SODIUM CHLORIDE 9 MG/ML
3000 INJECTION INTRAMUSCULAR; INTRAVENOUS; SUBCUTANEOUS ONCE
Refills: 0 | Status: COMPLETED | OUTPATIENT
Start: 2023-10-17 | End: 2023-10-17

## 2023-10-17 RX ORDER — ONDANSETRON 8 MG/1
4 TABLET, FILM COATED ORAL ONCE
Refills: 0 | Status: COMPLETED | OUTPATIENT
Start: 2023-10-17 | End: 2023-10-17

## 2023-10-17 RX ORDER — HYDROMORPHONE HYDROCHLORIDE 2 MG/ML
0.5 INJECTION INTRAMUSCULAR; INTRAVENOUS; SUBCUTANEOUS ONCE
Refills: 0 | Status: DISCONTINUED | OUTPATIENT
Start: 2023-10-17 | End: 2023-10-17

## 2023-10-17 RX ORDER — ACETAMINOPHEN 500 MG
650 TABLET ORAL ONCE
Refills: 0 | Status: COMPLETED | OUTPATIENT
Start: 2023-10-17 | End: 2023-10-17

## 2023-10-17 RX ORDER — FAMOTIDINE 10 MG/ML
20 INJECTION INTRAVENOUS ONCE
Refills: 0 | Status: COMPLETED | OUTPATIENT
Start: 2023-10-17 | End: 2023-10-17

## 2023-10-17 RX ORDER — KETOROLAC TROMETHAMINE 30 MG/ML
15 SYRINGE (ML) INJECTION ONCE
Refills: 0 | Status: DISCONTINUED | OUTPATIENT
Start: 2023-10-17 | End: 2023-10-17

## 2023-10-17 RX ADMIN — Medication 25 MILLIGRAM(S): at 05:55

## 2023-10-17 RX ADMIN — SODIUM CHLORIDE 3000 MILLILITER(S): 9 INJECTION INTRAMUSCULAR; INTRAVENOUS; SUBCUTANEOUS at 23:22

## 2023-10-17 RX ADMIN — HYDROMORPHONE HYDROCHLORIDE 0.5 MILLIGRAM(S): 2 INJECTION INTRAMUSCULAR; INTRAVENOUS; SUBCUTANEOUS at 23:23

## 2023-10-17 RX ADMIN — Medication 15 MILLIGRAM(S): at 06:15

## 2023-10-17 RX ADMIN — ONDANSETRON 4 MILLIGRAM(S): 8 TABLET, FILM COATED ORAL at 23:23

## 2023-10-17 RX ADMIN — Medication 5 MILLIGRAM(S): at 23:22

## 2023-10-17 RX ADMIN — MORPHINE SULFATE 4 MILLIGRAM(S): 50 CAPSULE, EXTENDED RELEASE ORAL at 06:15

## 2023-10-17 RX ADMIN — Medication 15 MILLIGRAM(S): at 05:54

## 2023-10-17 RX ADMIN — SODIUM CHLORIDE 2000 MILLILITER(S): 9 INJECTION INTRAMUSCULAR; INTRAVENOUS; SUBCUTANEOUS at 05:52

## 2023-10-17 RX ADMIN — MORPHINE SULFATE 4 MILLIGRAM(S): 50 CAPSULE, EXTENDED RELEASE ORAL at 06:04

## 2023-10-17 RX ADMIN — FAMOTIDINE 20 MILLIGRAM(S): 10 INJECTION INTRAVENOUS at 05:53

## 2023-10-17 RX ADMIN — Medication 15 MILLIGRAM(S): at 23:23

## 2023-10-17 RX ADMIN — ONDANSETRON 4 MILLIGRAM(S): 8 TABLET, FILM COATED ORAL at 05:55

## 2023-10-17 RX ADMIN — Medication 650 MILLIGRAM(S): at 09:44

## 2023-10-17 RX ADMIN — MORPHINE SULFATE 4 MILLIGRAM(S): 50 CAPSULE, EXTENDED RELEASE ORAL at 07:37

## 2023-10-17 RX ADMIN — MORPHINE SULFATE 4 MILLIGRAM(S): 50 CAPSULE, EXTENDED RELEASE ORAL at 08:15

## 2023-10-17 RX ADMIN — Medication 5 MILLIGRAM(S): at 07:37

## 2023-10-17 NOTE — ED ADULT NURSE NOTE - NSFALLUNIVINTERV_ED_ALL_ED
Bed/Stretcher in lowest position, wheels locked, appropriate side rails in place/Call bell, personal items and telephone in reach/Instruct patient to call for assistance before getting out of bed/chair/stretcher/Non-slip footwear applied when patient is off stretcher/[place] to call system/Physically safe environment - no spills, clutter or unnecessary equipment/Purposeful proactive rounding/Room/bathroom lighting operational, light cord in reach

## 2023-10-17 NOTE — ED PROVIDER NOTE - PATIENT PORTAL LINK FT
You can access the FollowMyHealth Patient Portal offered by [facility] by registering at the following website: [email or web address]. By joining [employer]’s FollowMyHealth portal, you will also be able to view your health information using other applications (apps) compatible with our system.

## 2023-10-17 NOTE — ED PROVIDER NOTE - NSFOLLOWUPINSTRUCTIONS_ED_ALL_ED_FT
zofran 4mg by mouth every 6 houirs  tylenol 650mg by mouth every 6 hours   avoid all drugs and alcohol  follow up with primary care doctor in 3 - 5 days

## 2023-10-17 NOTE — ED ADULT TRIAGE NOTE - CHIEF COMPLAINT QUOTE
pt presents c/o withdrawing from alcohol & xanax, last drink 4 days ago, last xanax 2 days ago. pt c/o nausea & back pain

## 2023-10-17 NOTE — ED PROVIDER NOTE - NS_EDPROVIDERDISPOUSERTYPE_ED_A_ED
Detail Level: Detailed Scribe Attestation (For Scribes USE Only)... Attending Attestation (For Attendings USE Only).../Scribe Attestation (For Scribes USE Only)...

## 2023-10-17 NOTE — ED ADULT TRIAGE NOTE - CHIEF COMPLAINT QUOTE
BIBEMS c/o severe nausea, vomiting, dry heaving in ambulance. pt states he feels like he is withdrawing. stopped alcohol and xanax 36 hours ago then took a xanax off the street at 1700 on 10/16/23. pt in CC, MD OGDEN at bedside for eval. BIBEMS c/o severe abdominal pain, nausea, vomiting, dry heaving in ambulance. pt states he feels like he is withdrawing. stopped alcohol and xanax 36 hours ago then took a xanax off the street at 1700 on 10/16/23. pt in CC, MD OGDEN at bedside for eval.

## 2023-10-17 NOTE — ED ADULT NURSE NOTE - CHIEF COMPLAINT QUOTE
BIBEMS c/o severe abdominal pain, nausea, vomiting, dry heaving in ambulance. pt states he feels like he is withdrawing. stopped alcohol and xanax 36 hours ago then took a xanax off the street at 1700 on 10/16/23. pt in CC, MD OGDEN at bedside for eval.

## 2023-10-17 NOTE — ED PROVIDER NOTE - OBJECTIVE STATEMENT
37 y/o male with PMHx of polysubstance abuse, pancreatitis, colitis presents to the ED c/o abdominal pain and N/V. Pt reports he stopped drinking and using a few days ago, took half  xanax two days ago. Pt states was in the ED yesterday for similar however still continues to have symptoms. Pt notes hx of cyclic vomiting and pancreatitis in the past however states this does not feel like his pancreatitis. 39 y/o male with PMHx of polysubstance abuse, pancreatitis, colitis presents to the ED c/o abdominal pain and N/V. Pt reports he stopped drinking and using a few days ago, took half  xanax two days ago. Pt states was in the ED yesterday for similar however still continues to have symptoms. Pt notes hx of cyclic vomiting and pancreatitis in the past however states this does not feel like his pancreatitis. Patient noted to have been in the ED earlier today and seen by myself. Labs and CT scan results reviewed.

## 2023-10-17 NOTE — ED PROVIDER NOTE - PROGRESS NOTE DETAILS
Patient states symptoms are persistent and is requesting additional medication for pain. CIWA is noted.

## 2023-10-17 NOTE — ED ADULT NURSE REASSESSMENT NOTE - NS ED NURSE REASSESS COMMENT FT1
assumed care of pt. Pt is A&Ox4 in severe abdominal pain rated 10/10. Pt states he took unprescribed xanax 12 hours ago and has had severe pain since. Pt is guarding abdominal area and reports pain radiates to rest of his body. Pt on CM and . VSS.

## 2023-10-17 NOTE — ED PROVIDER NOTE - CLINICAL SUMMARY MEDICAL DECISION MAKING FREE TEXT BOX
37 yo male with PMHX of etoh abuse, colitis, stomach ulcer BIBEMS with complaints of acute severe nausea, vomiting, and abdominal pain. Patient with acute s/p repeated nausea and vomiting. Unclear if pain is secondary to stomach pain from repeated vomiting and possible etoh withdrawal.

## 2023-10-17 NOTE — ED PROVIDER NOTE - CROS ED PSYCH ALL NEG
How Severe Is Your Skin Lesion?: mild
Is This A New Presentation, Or A Follow-Up?: Skin Lesion
- - -

## 2023-10-17 NOTE — ED PROVIDER NOTE - OBJECTIVE STATEMENT
39 yo male with BIBEMS c/o severe nausea, vomiting, and abdominal pain. Patient state he feels like he is withdrawi. stopped alcohol and xanax 36 hours ago then took a xanax off the street at 1700 on 10/16/23 39 yo male with BIBEMS c/o severe nausea, vomiting, and abdominal pain. Patient state he feels like he is in withdrawal. He drinks regularily. His last drink was the morning of 10/16.He states he took a xanax off the street at 1700 on 10/16/23. Then his symptoms started a few hoursago. 39 yo male with PMHX of etoh abuse, colitis, stomach ulcer BIBEMS with complaints of acute severe nausea, vomiting, and abdominal pain. Patient state he feels like he is in withdrawal. He drinks regularily. His last drink was the morning of 10/16 .He states he took a xanax off the street at 1700 on 10/16/23. Then his symptoms started a few hour sago.

## 2023-10-17 NOTE — ED PROVIDER NOTE - CLINICAL SUMMARY MEDICAL DECISION MAKING FREE TEXT BOX
Pt p/w abdominal pain, N/V and anxiety. will treat pain and anxiety symptoms, hydrate, obtain labs and dispo accordingly Pt p/w abdominal pain, N/V and anxiety. Recent results reviewed. Will treat pain and anxiety symptoms, hydrate, obtain labs and re-evaluate.    Nurse Jax informed me that the patient removed his own IV and walked out of the department not wanting to wait and discuss his concerns with me.

## 2023-10-17 NOTE — ED PROVIDER NOTE - PHYSICAL EXAMINATION
Gen: awake, alert, uncomfortable and anxious appearing  Head: NC/AT  Eyes: Clear bilaterally  ENT: Airway patent, trachea midline,   Card: tachycardic, regular rhythm   Resp:  CTAB  Abd: soft, + epigastric tenderness, non-distended, hyperactive jennifer sounds  Ext: no gross deformities  Skin: warm, dry and intact  Neuro: strength, motor and sensation grossly intact

## 2023-10-18 VITALS
TEMPERATURE: 98 F | HEART RATE: 80 BPM | DIASTOLIC BLOOD PRESSURE: 57 MMHG | OXYGEN SATURATION: 97 % | RESPIRATION RATE: 20 BRPM | SYSTOLIC BLOOD PRESSURE: 101 MMHG

## 2023-10-18 LAB
APPEARANCE UR: CLEAR — SIGNIFICANT CHANGE UP
APPEARANCE UR: CLEAR — SIGNIFICANT CHANGE UP
BASOPHILS # BLD AUTO: 0.16 K/UL — SIGNIFICANT CHANGE UP (ref 0–0.2)
BASOPHILS # BLD AUTO: 0.16 K/UL — SIGNIFICANT CHANGE UP (ref 0–0.2)
BASOPHILS NFR BLD AUTO: 1.7 % — SIGNIFICANT CHANGE UP (ref 0–2)
BASOPHILS NFR BLD AUTO: 1.7 % — SIGNIFICANT CHANGE UP (ref 0–2)
BILIRUB UR-MCNC: NEGATIVE — SIGNIFICANT CHANGE UP
BILIRUB UR-MCNC: NEGATIVE — SIGNIFICANT CHANGE UP
COLOR SPEC: YELLOW — SIGNIFICANT CHANGE UP
COLOR SPEC: YELLOW — SIGNIFICANT CHANGE UP
DIFF PNL FLD: NEGATIVE — SIGNIFICANT CHANGE UP
DIFF PNL FLD: NEGATIVE — SIGNIFICANT CHANGE UP
EOSINOPHIL # BLD AUTO: 0.16 K/UL — SIGNIFICANT CHANGE UP (ref 0–0.5)
EOSINOPHIL # BLD AUTO: 0.16 K/UL — SIGNIFICANT CHANGE UP (ref 0–0.5)
EOSINOPHIL NFR BLD AUTO: 1.7 % — SIGNIFICANT CHANGE UP (ref 0–6)
EOSINOPHIL NFR BLD AUTO: 1.7 % — SIGNIFICANT CHANGE UP (ref 0–6)
GLUCOSE UR QL: NEGATIVE MG/DL — SIGNIFICANT CHANGE UP
GLUCOSE UR QL: NEGATIVE MG/DL — SIGNIFICANT CHANGE UP
HCT VFR BLD CALC: 46.4 % — SIGNIFICANT CHANGE UP (ref 39–50)
HCT VFR BLD CALC: 46.4 % — SIGNIFICANT CHANGE UP (ref 39–50)
HGB BLD-MCNC: 17.1 G/DL — HIGH (ref 13–17)
HGB BLD-MCNC: 17.1 G/DL — HIGH (ref 13–17)
KETONES UR-MCNC: ABNORMAL
KETONES UR-MCNC: ABNORMAL
LEUKOCYTE ESTERASE UR-ACNC: NEGATIVE — SIGNIFICANT CHANGE UP
LEUKOCYTE ESTERASE UR-ACNC: NEGATIVE — SIGNIFICANT CHANGE UP
LYMPHOCYTES # BLD AUTO: 0.34 K/UL — LOW (ref 1–3.3)
LYMPHOCYTES # BLD AUTO: 0.34 K/UL — LOW (ref 1–3.3)
LYMPHOCYTES # BLD AUTO: 3.5 % — LOW (ref 13–44)
LYMPHOCYTES # BLD AUTO: 3.5 % — LOW (ref 13–44)
MANUAL SMEAR VERIFICATION: SIGNIFICANT CHANGE UP
MANUAL SMEAR VERIFICATION: SIGNIFICANT CHANGE UP
MCHC RBC-ENTMCNC: 35.3 PG — HIGH (ref 27–34)
MCHC RBC-ENTMCNC: 35.3 PG — HIGH (ref 27–34)
MCHC RBC-ENTMCNC: 36.9 GM/DL — HIGH (ref 32–36)
MCHC RBC-ENTMCNC: 36.9 GM/DL — HIGH (ref 32–36)
MCV RBC AUTO: 95.9 FL — SIGNIFICANT CHANGE UP (ref 80–100)
MCV RBC AUTO: 95.9 FL — SIGNIFICANT CHANGE UP (ref 80–100)
MONOCYTES # BLD AUTO: 0.68 K/UL — SIGNIFICANT CHANGE UP (ref 0–0.9)
MONOCYTES # BLD AUTO: 0.68 K/UL — SIGNIFICANT CHANGE UP (ref 0–0.9)
MONOCYTES NFR BLD AUTO: 7 % — SIGNIFICANT CHANGE UP (ref 2–14)
MONOCYTES NFR BLD AUTO: 7 % — SIGNIFICANT CHANGE UP (ref 2–14)
NEUTROPHILS # BLD AUTO: 8.26 K/UL — HIGH (ref 1.8–7.4)
NEUTROPHILS # BLD AUTO: 8.26 K/UL — HIGH (ref 1.8–7.4)
NEUTROPHILS NFR BLD AUTO: 85.2 % — HIGH (ref 43–77)
NEUTROPHILS NFR BLD AUTO: 85.2 % — HIGH (ref 43–77)
NITRITE UR-MCNC: NEGATIVE — SIGNIFICANT CHANGE UP
NITRITE UR-MCNC: NEGATIVE — SIGNIFICANT CHANGE UP
PH UR: 8 — SIGNIFICANT CHANGE UP (ref 5–8)
PH UR: 8 — SIGNIFICANT CHANGE UP (ref 5–8)
PLAT MORPH BLD: NORMAL — SIGNIFICANT CHANGE UP
PLAT MORPH BLD: NORMAL — SIGNIFICANT CHANGE UP
PLATELET # BLD AUTO: 309 K/UL — SIGNIFICANT CHANGE UP (ref 150–400)
PLATELET # BLD AUTO: 309 K/UL — SIGNIFICANT CHANGE UP (ref 150–400)
RAPID RVP RESULT: SIGNIFICANT CHANGE UP
RAPID RVP RESULT: SIGNIFICANT CHANGE UP
RBC # BLD: 4.84 M/UL — SIGNIFICANT CHANGE UP (ref 4.2–5.8)
RBC # BLD: 4.84 M/UL — SIGNIFICANT CHANGE UP (ref 4.2–5.8)
RBC # FLD: 12.2 % — SIGNIFICANT CHANGE UP (ref 10.3–14.5)
RBC # FLD: 12.2 % — SIGNIFICANT CHANGE UP (ref 10.3–14.5)
RBC BLD AUTO: NORMAL — SIGNIFICANT CHANGE UP
RBC BLD AUTO: NORMAL — SIGNIFICANT CHANGE UP
SARS-COV-2 RNA SPEC QL NAA+PROBE: SIGNIFICANT CHANGE UP
SARS-COV-2 RNA SPEC QL NAA+PROBE: SIGNIFICANT CHANGE UP
SP GR SPEC: 1.01 — SIGNIFICANT CHANGE UP (ref 1.01–1.02)
SP GR SPEC: 1.01 — SIGNIFICANT CHANGE UP (ref 1.01–1.02)
UROBILINOGEN FLD QL: NEGATIVE MG/DL — SIGNIFICANT CHANGE UP
UROBILINOGEN FLD QL: NEGATIVE MG/DL — SIGNIFICANT CHANGE UP
VARIANT LYMPHS # BLD: 0.9 % — SIGNIFICANT CHANGE UP (ref 0–6)
VARIANT LYMPHS # BLD: 0.9 % — SIGNIFICANT CHANGE UP (ref 0–6)
WBC # BLD: 9.69 K/UL — SIGNIFICANT CHANGE UP (ref 3.8–10.5)
WBC # BLD: 9.69 K/UL — SIGNIFICANT CHANGE UP (ref 3.8–10.5)
WBC # FLD AUTO: 9.69 K/UL — SIGNIFICANT CHANGE UP (ref 3.8–10.5)
WBC # FLD AUTO: 9.69 K/UL — SIGNIFICANT CHANGE UP (ref 3.8–10.5)

## 2023-10-18 PROCEDURE — 74177 CT ABD & PELVIS W/CONTRAST: CPT | Mod: ME

## 2023-10-18 PROCEDURE — 82330 ASSAY OF CALCIUM: CPT

## 2023-10-18 PROCEDURE — 36415 COLL VENOUS BLD VENIPUNCTURE: CPT

## 2023-10-18 PROCEDURE — 85730 THROMBOPLASTIN TIME PARTIAL: CPT

## 2023-10-18 PROCEDURE — 96361 HYDRATE IV INFUSION ADD-ON: CPT

## 2023-10-18 PROCEDURE — 84132 ASSAY OF SERUM POTASSIUM: CPT

## 2023-10-18 PROCEDURE — 80307 DRUG TEST PRSMV CHEM ANLYZR: CPT

## 2023-10-18 PROCEDURE — 85018 HEMOGLOBIN: CPT

## 2023-10-18 PROCEDURE — 84484 ASSAY OF TROPONIN QUANT: CPT

## 2023-10-18 PROCEDURE — 85014 HEMATOCRIT: CPT

## 2023-10-18 PROCEDURE — 86703 HIV-1/HIV-2 1 RESULT ANTBDY: CPT

## 2023-10-18 PROCEDURE — 96376 TX/PRO/DX INJ SAME DRUG ADON: CPT

## 2023-10-18 PROCEDURE — 93005 ELECTROCARDIOGRAM TRACING: CPT

## 2023-10-18 PROCEDURE — 80053 COMPREHEN METABOLIC PANEL: CPT

## 2023-10-18 PROCEDURE — 73600 X-RAY EXAM OF ANKLE: CPT

## 2023-10-18 PROCEDURE — 85610 PROTHROMBIN TIME: CPT

## 2023-10-18 PROCEDURE — 83690 ASSAY OF LIPASE: CPT

## 2023-10-18 PROCEDURE — 96374 THER/PROPH/DIAG INJ IV PUSH: CPT

## 2023-10-18 PROCEDURE — 96372 THER/PROPH/DIAG INJ SC/IM: CPT | Mod: XU

## 2023-10-18 PROCEDURE — 81001 URINALYSIS AUTO W/SCOPE: CPT

## 2023-10-18 PROCEDURE — 82435 ASSAY OF BLOOD CHLORIDE: CPT

## 2023-10-18 PROCEDURE — 99285 EMERGENCY DEPT VISIT HI MDM: CPT | Mod: 25

## 2023-10-18 PROCEDURE — 82962 GLUCOSE BLOOD TEST: CPT

## 2023-10-18 PROCEDURE — 99284 EMERGENCY DEPT VISIT MOD MDM: CPT | Mod: 25

## 2023-10-18 PROCEDURE — 83605 ASSAY OF LACTIC ACID: CPT

## 2023-10-18 PROCEDURE — 82947 ASSAY GLUCOSE BLOOD QUANT: CPT

## 2023-10-18 PROCEDURE — 85025 COMPLETE CBC W/AUTO DIFF WBC: CPT

## 2023-10-18 PROCEDURE — 0225U NFCT DS DNA&RNA 21 SARSCOV2: CPT

## 2023-10-18 PROCEDURE — G1004: CPT

## 2023-10-18 PROCEDURE — 84295 ASSAY OF SERUM SODIUM: CPT

## 2023-10-18 PROCEDURE — 82803 BLOOD GASES ANY COMBINATION: CPT

## 2023-10-18 PROCEDURE — 81003 URINALYSIS AUTO W/O SCOPE: CPT

## 2023-10-18 PROCEDURE — 96375 TX/PRO/DX INJ NEW DRUG ADDON: CPT

## 2023-10-18 RX ORDER — HALOPERIDOL DECANOATE 100 MG/ML
2.5 INJECTION INTRAMUSCULAR ONCE
Refills: 0 | Status: COMPLETED | OUTPATIENT
Start: 2023-10-18 | End: 2023-10-18

## 2023-10-18 RX ORDER — ACETAMINOPHEN 500 MG
1000 TABLET ORAL ONCE
Refills: 0 | Status: COMPLETED | OUTPATIENT
Start: 2023-10-18 | End: 2023-10-18

## 2023-10-18 RX ADMIN — Medication 15 MILLIGRAM(S): at 02:17

## 2023-10-18 RX ADMIN — HYDROMORPHONE HYDROCHLORIDE 0.5 MILLIGRAM(S): 2 INJECTION INTRAMUSCULAR; INTRAVENOUS; SUBCUTANEOUS at 02:17

## 2023-10-18 RX ADMIN — Medication 400 MILLIGRAM(S): at 02:26

## 2023-10-18 RX ADMIN — SODIUM CHLORIDE 3000 MILLILITER(S): 9 INJECTION INTRAMUSCULAR; INTRAVENOUS; SUBCUTANEOUS at 02:17

## 2023-10-18 RX ADMIN — HALOPERIDOL DECANOATE 2.5 MILLIGRAM(S): 100 INJECTION INTRAMUSCULAR at 02:27

## 2023-10-18 NOTE — ED ADULT NURSE NOTE - CHIEF COMPLAINT QUOTE
Identified pt with two pt identifiers(name and ). Reviewed record in preparation for visit and have obtained necessary documentation. Chief Complaint   Patient presents with    Hypertension     6 month follow up    Diabetes    Cholesterol Problem        Health Maintenance Due   Topic    DTaP/Tdap/Td series (1 - Tdap)    Shingrix Vaccine Age 50> (1 of 2)    FOOT EXAM Q1     HEMOGLOBIN A1C Q6M        Coordination of Care Questionnaire:  :   1) Have you been to an emergency room, urgent care, or hospitalized since your last visit? If yes, where when, and reason for visit? no       2. Have seen or consulted any other health care provider since your last visit? If yes, where when, and reason for visit? NO      3) Do you have an Advanced Directive/ Living Will in place? YES  If yes, do we have a copy on file NO, pt has it at home and will bring a copy to office. If no, would you like information NO    Patient is accompanied by self I have received verbal consent from Susan Gordon to discuss any/all medical information while they are present in the room. pt presents c/o withdrawing from alcohol & xanax, last drink 4 days ago, last xanax 2 days ago. pt c/o nausea & back pain

## 2023-10-18 NOTE — ED ADULT NURSE NOTE - OBJECTIVE STATEMENT
pt is 38 year old male who was d/c this AM around 10. pt states 2 days ago he bought a xanax off the street from someone he does not know and two hours after started experiencing 10/10 generalized abdominal pain and multiple episodes of vomiting. pt admits to drug and alcohol use frequently. Pt denies fevers. pt a&o and ambulating with steady gait upon arrival; breathing equal and unlabored.

## 2023-10-18 NOTE — ED ADULT NURSE NOTE - NSFALLUNIVINTERV_ED_ALL_ED
Bed/Stretcher in lowest position, wheels locked, appropriate side rails in place/Call bell, personal items and telephone in reach/Instruct patient to call for assistance before getting out of bed/chair/stretcher/Non-slip footwear applied when patient is off stretcher/[place] to call system/Physically safe environment - no spills, clutter or unnecessary equipment/Purposeful proactive rounding/Room/bathroom lighting operational, light cord in reach

## 2023-10-18 NOTE — ED ADULT NURSE REASSESSMENT NOTE - NS ED NURSE REASSESS COMMENT FT1
Pt became increasingly agitated when he was not receiving more pain medications. pt was instructed on the medications he was given and the dose regimens for each medication. pt refused to wait for Md to come speak with patient on updates. therapeutic communication attempted with patient. when RN was in medication room, pt ripped out his own IV and walked out of the Ed. pt was last seen around 3:30 am. pt did not announce his leaving or explain why he wanted to leave. pt was in gown and breathing equal and unlabored and pt ambulating with steady gait during length of stay in the ED. MD cisneros notified; charge RN notified and IV witnessed in bed.

## 2023-10-20 ENCOUNTER — EMERGENCY (EMERGENCY)
Facility: HOSPITAL | Age: 38
LOS: 1 days | Discharge: HOME | End: 2023-10-20
Attending: EMERGENCY MEDICINE
Payer: MEDICAID

## 2023-10-20 VITALS
DIASTOLIC BLOOD PRESSURE: 77 MMHG | TEMPERATURE: 98 F | OXYGEN SATURATION: 100 % | RESPIRATION RATE: 18 BRPM | HEIGHT: 71 IN | SYSTOLIC BLOOD PRESSURE: 136 MMHG | WEIGHT: 134.92 LBS | HEART RATE: 54 BPM

## 2023-10-20 VITALS
RESPIRATION RATE: 18 BRPM | HEIGHT: 71 IN | HEART RATE: 68 BPM | DIASTOLIC BLOOD PRESSURE: 84 MMHG | OXYGEN SATURATION: 100 % | WEIGHT: 169.98 LBS | SYSTOLIC BLOOD PRESSURE: 147 MMHG

## 2023-10-20 LAB
ALBUMIN SERPL ELPH-MCNC: 4.5 G/DL — SIGNIFICANT CHANGE UP (ref 3.3–5.2)
ALBUMIN SERPL ELPH-MCNC: 4.5 G/DL — SIGNIFICANT CHANGE UP (ref 3.3–5.2)
ALP SERPL-CCNC: 72 U/L — SIGNIFICANT CHANGE UP (ref 40–120)
ALP SERPL-CCNC: 72 U/L — SIGNIFICANT CHANGE UP (ref 40–120)
ALT FLD-CCNC: 22 U/L — SIGNIFICANT CHANGE UP
ALT FLD-CCNC: 22 U/L — SIGNIFICANT CHANGE UP
AMPHET UR-MCNC: NEGATIVE — SIGNIFICANT CHANGE UP
AMPHET UR-MCNC: NEGATIVE — SIGNIFICANT CHANGE UP
ANION GAP SERPL CALC-SCNC: 15 MMOL/L — SIGNIFICANT CHANGE UP (ref 5–17)
ANION GAP SERPL CALC-SCNC: 15 MMOL/L — SIGNIFICANT CHANGE UP (ref 5–17)
APAP SERPL-MCNC: <3 UG/ML — LOW (ref 10–26)
APAP SERPL-MCNC: <3 UG/ML — LOW (ref 10–26)
AST SERPL-CCNC: 27 U/L — SIGNIFICANT CHANGE UP
AST SERPL-CCNC: 27 U/L — SIGNIFICANT CHANGE UP
BARBITURATES UR SCN-MCNC: NEGATIVE — SIGNIFICANT CHANGE UP
BARBITURATES UR SCN-MCNC: NEGATIVE — SIGNIFICANT CHANGE UP
BENZODIAZ UR-MCNC: POSITIVE
BENZODIAZ UR-MCNC: POSITIVE
BILIRUB SERPL-MCNC: 0.5 MG/DL — SIGNIFICANT CHANGE UP (ref 0.4–2)
BILIRUB SERPL-MCNC: 0.5 MG/DL — SIGNIFICANT CHANGE UP (ref 0.4–2)
BUN SERPL-MCNC: 9.5 MG/DL — SIGNIFICANT CHANGE UP (ref 8–20)
BUN SERPL-MCNC: 9.5 MG/DL — SIGNIFICANT CHANGE UP (ref 8–20)
CALCIUM SERPL-MCNC: 9.6 MG/DL — SIGNIFICANT CHANGE UP (ref 8.4–10.5)
CALCIUM SERPL-MCNC: 9.6 MG/DL — SIGNIFICANT CHANGE UP (ref 8.4–10.5)
CHLORIDE SERPL-SCNC: 98 MMOL/L — SIGNIFICANT CHANGE UP (ref 96–108)
CHLORIDE SERPL-SCNC: 98 MMOL/L — SIGNIFICANT CHANGE UP (ref 96–108)
CO2 SERPL-SCNC: 25 MMOL/L — SIGNIFICANT CHANGE UP (ref 22–29)
CO2 SERPL-SCNC: 25 MMOL/L — SIGNIFICANT CHANGE UP (ref 22–29)
COCAINE METAB.OTHER UR-MCNC: POSITIVE
COCAINE METAB.OTHER UR-MCNC: POSITIVE
CREAT SERPL-MCNC: 0.83 MG/DL — SIGNIFICANT CHANGE UP (ref 0.5–1.3)
CREAT SERPL-MCNC: 0.83 MG/DL — SIGNIFICANT CHANGE UP (ref 0.5–1.3)
EGFR: 116 ML/MIN/1.73M2 — SIGNIFICANT CHANGE UP
EGFR: 116 ML/MIN/1.73M2 — SIGNIFICANT CHANGE UP
ETHANOL SERPL-MCNC: <10 MG/DL — SIGNIFICANT CHANGE UP (ref 0–9)
ETHANOL SERPL-MCNC: <10 MG/DL — SIGNIFICANT CHANGE UP (ref 0–9)
GLUCOSE SERPL-MCNC: 110 MG/DL — HIGH (ref 70–99)
GLUCOSE SERPL-MCNC: 110 MG/DL — HIGH (ref 70–99)
METHADONE UR-MCNC: NEGATIVE — SIGNIFICANT CHANGE UP
METHADONE UR-MCNC: NEGATIVE — SIGNIFICANT CHANGE UP
OPIATES UR-MCNC: NEGATIVE — SIGNIFICANT CHANGE UP
OPIATES UR-MCNC: NEGATIVE — SIGNIFICANT CHANGE UP
PCP SPEC-MCNC: SIGNIFICANT CHANGE UP
PCP SPEC-MCNC: SIGNIFICANT CHANGE UP
PCP UR-MCNC: NEGATIVE — SIGNIFICANT CHANGE UP
PCP UR-MCNC: NEGATIVE — SIGNIFICANT CHANGE UP
POTASSIUM SERPL-MCNC: 3.6 MMOL/L — SIGNIFICANT CHANGE UP (ref 3.5–5.3)
POTASSIUM SERPL-MCNC: 3.6 MMOL/L — SIGNIFICANT CHANGE UP (ref 3.5–5.3)
POTASSIUM SERPL-SCNC: 3.6 MMOL/L — SIGNIFICANT CHANGE UP (ref 3.5–5.3)
POTASSIUM SERPL-SCNC: 3.6 MMOL/L — SIGNIFICANT CHANGE UP (ref 3.5–5.3)
PROT SERPL-MCNC: 7.2 G/DL — SIGNIFICANT CHANGE UP (ref 6.6–8.7)
PROT SERPL-MCNC: 7.2 G/DL — SIGNIFICANT CHANGE UP (ref 6.6–8.7)
SALICYLATES SERPL-MCNC: <0.6 MG/DL — LOW (ref 10–20)
SALICYLATES SERPL-MCNC: <0.6 MG/DL — LOW (ref 10–20)
SODIUM SERPL-SCNC: 138 MMOL/L — SIGNIFICANT CHANGE UP (ref 135–145)
SODIUM SERPL-SCNC: 138 MMOL/L — SIGNIFICANT CHANGE UP (ref 135–145)
THC UR QL: POSITIVE
THC UR QL: POSITIVE

## 2023-10-20 PROCEDURE — 99283 EMERGENCY DEPT VISIT LOW MDM: CPT

## 2023-10-20 PROCEDURE — 99285 EMERGENCY DEPT VISIT HI MDM: CPT

## 2023-10-20 PROCEDURE — 93010 ELECTROCARDIOGRAM REPORT: CPT

## 2023-10-20 RX ORDER — KETOROLAC TROMETHAMINE 30 MG/ML
30 SYRINGE (ML) INJECTION ONCE
Refills: 0 | Status: DISCONTINUED | OUTPATIENT
Start: 2023-10-20 | End: 2023-10-20

## 2023-10-20 RX ORDER — METHOCARBAMOL 500 MG/1
1500 TABLET, FILM COATED ORAL ONCE
Refills: 0 | Status: COMPLETED | OUTPATIENT
Start: 2023-10-20 | End: 2023-10-20

## 2023-10-20 RX ORDER — SODIUM CHLORIDE 9 MG/ML
1000 INJECTION INTRAMUSCULAR; INTRAVENOUS; SUBCUTANEOUS ONCE
Refills: 0 | Status: COMPLETED | OUTPATIENT
Start: 2023-10-20 | End: 2023-10-20

## 2023-10-20 RX ORDER — ACETAMINOPHEN 500 MG
975 TABLET ORAL ONCE
Refills: 0 | Status: COMPLETED | OUTPATIENT
Start: 2023-10-20 | End: 2023-10-20

## 2023-10-20 RX ORDER — ONDANSETRON 8 MG/1
4 TABLET, FILM COATED ORAL ONCE
Refills: 0 | Status: COMPLETED | OUTPATIENT
Start: 2023-10-20 | End: 2023-10-20

## 2023-10-20 RX ADMIN — METHOCARBAMOL 1500 MILLIGRAM(S): 500 TABLET, FILM COATED ORAL at 23:11

## 2023-10-20 RX ADMIN — Medication 30 MILLIGRAM(S): at 23:11

## 2023-10-20 RX ADMIN — ONDANSETRON 4 MILLIGRAM(S): 8 TABLET, FILM COATED ORAL at 23:11

## 2023-10-20 RX ADMIN — SODIUM CHLORIDE 1000 MILLILITER(S): 9 INJECTION INTRAMUSCULAR; INTRAVENOUS; SUBCUTANEOUS at 23:12

## 2023-10-20 NOTE — ED ADULT TRIAGE NOTE - CHIEF COMPLAINT QUOTE
Pt recently d/c stated that he is going through withdrawl from Ketamine, alcohol and xanax. Pt stated that he used 36-44 hours ago. Complaining of back pain.

## 2023-10-20 NOTE — ED PROVIDER NOTE - PHYSICAL EXAMINATION
Const: Awake, alert and oriented.   HEENT: NC/AT. Moist mucous membranes.  Eyes: No scleral icterus. EOMI.  Neck:. Soft and supple. Full ROM without pain.  Cardiac: Regular rate and regular rhythm. +S1/S2. Peripheral pulses 2+ and symmetric. No LE edema.  Resp: Speaking in full sentences. No evidence of respiratory distress. No wheezes, rales or rhonchi.  Abd: Soft, non-tender, non-distended. Normal bowel sounds in all 4 quadrants. No guarding or rebound.  Back: Spine midline and non-tender. No CVAT.  Skin: No rashes, abrasions or lacerations.  Lymph: No cervical lymphadenopathy.  Neuro: A&Ox3, moving all extremities symmetrically, follows commands, motor guillermo upper and lower ext 5/5, sensory symm and intact CN 2-12 grossly intact, no ataxia, no nystagmus, no dysmetria, no ddk, symm guillermo, no pronator drift

## 2023-10-20 NOTE — ED PROVIDER NOTE - PATIENT PORTAL LINK FT
You can access the FollowMyHealth Patient Portal offered by [facility] by registering at the following website: [email or web address]. By joining [employer]’s FollowMyHealth portal, you will also be able to view your health information using other applications (apps) compatible with our system.

## 2023-10-20 NOTE — ED PROVIDER NOTE - OBJECTIVE STATEMENT
Patient presents to ED  stating " I did ketamine and cocaine today."   Describing mild intermittent achy back pain.  No fevers.  No chest pain or shortness of breath.  No abdominal pain.  No nausea vomiting diarrhea.  No suicidal homicidal ideation.  No visual auditory hallucinations.

## 2023-10-20 NOTE — ED PROVIDER NOTE - ATTENDING CONTRIBUTION TO CARE
I personally saw the patient with the resident, and completed the key components of the history and physical exam. I then discussed the management plan with the resident. I personally saw the patient with the resident, and completed the key components of the history and physical exam. I then discussed the management plan with the resident.    pt with polysubstance abuse requesting detox and family concerned pt has been using substances to self harm, pt not in active wd, medically stable for  eval, SBIRT consult

## 2023-10-20 NOTE — ED PROVIDER NOTE - CLINICAL SUMMARY MEDICAL DECISION MAKING FREE TEXT BOX
patient not suicidal or homicidal advised needs to discontinue his drug abuse refusing rehab likely sprain of lower back no high risk low back pain features including fever recent IV drug abuse recent lower back procedures no bowel or bladder incontinence or retention advised to take acetaminophen ibuprofen follow-up with PCP

## 2023-10-20 NOTE — ED PROVIDER NOTE - OBJECTIVE STATEMENT
38 male history of polysubstance abuse presents reporting body aches and nausea.  Per patient and brother Slade ([phone]) at bedside, patient has been seeking outpatient detox, and due to insurance issues has not been able to receive help until November 1 when his insurance begins.  Throughout this time, patient reports using multiple substances to combat his symptoms of withdrawal from his Xanax and alcohol, including cocaine and ketamine (last use reportedly 2 grams today).  Patient reports last use of Xanax and alcohol yesterday morning.  States he drinks about a pint a day 4 mg of Xanax, however brother at bedside states patient uses much more. They are concerned that pt has been using other substances recently in an attempt to self harm. Pt reports passive feelings of SI worsening over the past few weeks. Denies HI, hallucinations.

## 2023-10-20 NOTE — ED PROVIDER NOTE - CLINICAL SUMMARY MEDICAL DECISION MAKING FREE TEXT BOX
38M hx polysubstance abuse presents with passive SI. Reportedly last used multiple substances over the past day. Will obtain psych clearance labs,  consult, monitor for withdrawal signs while in ED

## 2023-10-21 VITALS
HEART RATE: 68 BPM | RESPIRATION RATE: 20 BRPM | DIASTOLIC BLOOD PRESSURE: 80 MMHG | SYSTOLIC BLOOD PRESSURE: 135 MMHG | OXYGEN SATURATION: 99 % | TEMPERATURE: 98 F

## 2023-10-21 DIAGNOSIS — F19.10 OTHER PSYCHOACTIVE SUBSTANCE ABUSE, UNCOMPLICATED: ICD-10-CM

## 2023-10-21 DIAGNOSIS — F19.930: ICD-10-CM

## 2023-10-21 LAB
BASOPHILS # BLD AUTO: 0.04 K/UL — SIGNIFICANT CHANGE UP (ref 0–0.2)
BASOPHILS # BLD AUTO: 0.04 K/UL — SIGNIFICANT CHANGE UP (ref 0–0.2)
BASOPHILS NFR BLD AUTO: 0.5 % — SIGNIFICANT CHANGE UP (ref 0–2)
BASOPHILS NFR BLD AUTO: 0.5 % — SIGNIFICANT CHANGE UP (ref 0–2)
EOSINOPHIL # BLD AUTO: 0.02 K/UL — SIGNIFICANT CHANGE UP (ref 0–0.5)
EOSINOPHIL # BLD AUTO: 0.02 K/UL — SIGNIFICANT CHANGE UP (ref 0–0.5)
EOSINOPHIL NFR BLD AUTO: 0.2 % — SIGNIFICANT CHANGE UP (ref 0–6)
EOSINOPHIL NFR BLD AUTO: 0.2 % — SIGNIFICANT CHANGE UP (ref 0–6)
HCT VFR BLD CALC: 39.7 % — SIGNIFICANT CHANGE UP (ref 39–50)
HCT VFR BLD CALC: 39.7 % — SIGNIFICANT CHANGE UP (ref 39–50)
HGB BLD-MCNC: 14.7 G/DL — SIGNIFICANT CHANGE UP (ref 13–17)
HGB BLD-MCNC: 14.7 G/DL — SIGNIFICANT CHANGE UP (ref 13–17)
IMM GRANULOCYTES NFR BLD AUTO: 0.2 % — SIGNIFICANT CHANGE UP (ref 0–0.9)
IMM GRANULOCYTES NFR BLD AUTO: 0.2 % — SIGNIFICANT CHANGE UP (ref 0–0.9)
LYMPHOCYTES # BLD AUTO: 0.74 K/UL — LOW (ref 1–3.3)
LYMPHOCYTES # BLD AUTO: 0.74 K/UL — LOW (ref 1–3.3)
LYMPHOCYTES # BLD AUTO: 8.5 % — LOW (ref 13–44)
LYMPHOCYTES # BLD AUTO: 8.5 % — LOW (ref 13–44)
MCHC RBC-ENTMCNC: 34.5 PG — HIGH (ref 27–34)
MCHC RBC-ENTMCNC: 34.5 PG — HIGH (ref 27–34)
MCHC RBC-ENTMCNC: 37 GM/DL — HIGH (ref 32–36)
MCHC RBC-ENTMCNC: 37 GM/DL — HIGH (ref 32–36)
MCV RBC AUTO: 93.2 FL — SIGNIFICANT CHANGE UP (ref 80–100)
MCV RBC AUTO: 93.2 FL — SIGNIFICANT CHANGE UP (ref 80–100)
MONOCYTES # BLD AUTO: 0.39 K/UL — SIGNIFICANT CHANGE UP (ref 0–0.9)
MONOCYTES # BLD AUTO: 0.39 K/UL — SIGNIFICANT CHANGE UP (ref 0–0.9)
MONOCYTES NFR BLD AUTO: 4.5 % — SIGNIFICANT CHANGE UP (ref 2–14)
MONOCYTES NFR BLD AUTO: 4.5 % — SIGNIFICANT CHANGE UP (ref 2–14)
NEUTROPHILS # BLD AUTO: 7.54 K/UL — HIGH (ref 1.8–7.4)
NEUTROPHILS # BLD AUTO: 7.54 K/UL — HIGH (ref 1.8–7.4)
NEUTROPHILS NFR BLD AUTO: 86.1 % — HIGH (ref 43–77)
NEUTROPHILS NFR BLD AUTO: 86.1 % — HIGH (ref 43–77)
PLATELET # BLD AUTO: 283 K/UL — SIGNIFICANT CHANGE UP (ref 150–400)
PLATELET # BLD AUTO: 283 K/UL — SIGNIFICANT CHANGE UP (ref 150–400)
RBC # BLD: 4.26 M/UL — SIGNIFICANT CHANGE UP (ref 4.2–5.8)
RBC # BLD: 4.26 M/UL — SIGNIFICANT CHANGE UP (ref 4.2–5.8)
RBC # FLD: 11.8 % — SIGNIFICANT CHANGE UP (ref 10.3–14.5)
RBC # FLD: 11.8 % — SIGNIFICANT CHANGE UP (ref 10.3–14.5)
SARS-COV-2 RNA SPEC QL NAA+PROBE: SIGNIFICANT CHANGE UP
SARS-COV-2 RNA SPEC QL NAA+PROBE: SIGNIFICANT CHANGE UP
T4 AB SER-ACNC: 8.2 UG/DL — SIGNIFICANT CHANGE UP (ref 4.5–12)
T4 AB SER-ACNC: 8.2 UG/DL — SIGNIFICANT CHANGE UP (ref 4.5–12)
TSH SERPL-MCNC: 0.89 UIU/ML — SIGNIFICANT CHANGE UP (ref 0.27–4.2)
TSH SERPL-MCNC: 0.89 UIU/ML — SIGNIFICANT CHANGE UP (ref 0.27–4.2)
WBC # BLD: 8.75 K/UL — SIGNIFICANT CHANGE UP (ref 3.8–10.5)
WBC # BLD: 8.75 K/UL — SIGNIFICANT CHANGE UP (ref 3.8–10.5)
WBC # FLD AUTO: 8.75 K/UL — SIGNIFICANT CHANGE UP (ref 3.8–10.5)
WBC # FLD AUTO: 8.75 K/UL — SIGNIFICANT CHANGE UP (ref 3.8–10.5)

## 2023-10-21 PROCEDURE — 87635 SARS-COV-2 COVID-19 AMP PRB: CPT

## 2023-10-21 PROCEDURE — 84436 ASSAY OF TOTAL THYROXINE: CPT

## 2023-10-21 PROCEDURE — 93005 ELECTROCARDIOGRAM TRACING: CPT

## 2023-10-21 PROCEDURE — 80053 COMPREHEN METABOLIC PANEL: CPT

## 2023-10-21 PROCEDURE — G0396: CPT

## 2023-10-21 PROCEDURE — 96374 THER/PROPH/DIAG INJ IV PUSH: CPT

## 2023-10-21 PROCEDURE — 99284 EMERGENCY DEPT VISIT MOD MDM: CPT | Mod: 25

## 2023-10-21 PROCEDURE — 99284 EMERGENCY DEPT VISIT MOD MDM: CPT

## 2023-10-21 PROCEDURE — 99283 EMERGENCY DEPT VISIT LOW MDM: CPT | Mod: 25

## 2023-10-21 PROCEDURE — 85025 COMPLETE CBC W/AUTO DIFF WBC: CPT

## 2023-10-21 PROCEDURE — 96375 TX/PRO/DX INJ NEW DRUG ADDON: CPT

## 2023-10-21 PROCEDURE — 99236 HOSP IP/OBS SAME DATE HI 85: CPT

## 2023-10-21 PROCEDURE — 84443 ASSAY THYROID STIM HORMONE: CPT

## 2023-10-21 PROCEDURE — 36415 COLL VENOUS BLD VENIPUNCTURE: CPT

## 2023-10-21 PROCEDURE — 96361 HYDRATE IV INFUSION ADD-ON: CPT

## 2023-10-21 PROCEDURE — G0378: CPT

## 2023-10-21 PROCEDURE — 96376 TX/PRO/DX INJ SAME DRUG ADON: CPT

## 2023-10-21 PROCEDURE — 80307 DRUG TEST PRSMV CHEM ANLYZR: CPT

## 2023-10-21 RX ORDER — METOCLOPRAMIDE HCL 10 MG
10 TABLET ORAL ONCE
Refills: 0 | Status: COMPLETED | OUTPATIENT
Start: 2023-10-21 | End: 2023-10-21

## 2023-10-21 RX ORDER — SODIUM CHLORIDE 9 MG/ML
1000 INJECTION, SOLUTION INTRAVENOUS ONCE
Refills: 0 | Status: COMPLETED | OUTPATIENT
Start: 2023-10-21 | End: 2023-10-21

## 2023-10-21 RX ORDER — IBUPROFEN 200 MG
600 TABLET ORAL ONCE
Refills: 0 | Status: COMPLETED | OUTPATIENT
Start: 2023-10-21 | End: 2023-10-21

## 2023-10-21 RX ORDER — PANTOPRAZOLE SODIUM 20 MG/1
40 TABLET, DELAYED RELEASE ORAL ONCE
Refills: 0 | Status: COMPLETED | OUTPATIENT
Start: 2023-10-21 | End: 2023-10-21

## 2023-10-21 RX ORDER — IBUPROFEN 200 MG
400 TABLET ORAL ONCE
Refills: 0 | Status: DISCONTINUED | OUTPATIENT
Start: 2023-10-21 | End: 2023-10-28

## 2023-10-21 RX ORDER — IBUPROFEN 200 MG
1 TABLET ORAL
Qty: 15 | Refills: 0
Start: 2023-10-21 | End: 2023-10-25

## 2023-10-21 RX ORDER — ONDANSETRON 8 MG/1
4 TABLET, FILM COATED ORAL ONCE
Refills: 0 | Status: COMPLETED | OUTPATIENT
Start: 2023-10-21 | End: 2023-10-21

## 2023-10-21 RX ORDER — PANTOPRAZOLE SODIUM 20 MG/1
1 TABLET, DELAYED RELEASE ORAL
Qty: 5 | Refills: 0
Start: 2023-10-21 | End: 2023-10-25

## 2023-10-21 RX ORDER — ONDANSETRON 8 MG/1
1 TABLET, FILM COATED ORAL
Qty: 9 | Refills: 0
Start: 2023-10-21 | End: 2023-10-23

## 2023-10-21 RX ORDER — DIPHENHYDRAMINE HCL 50 MG
50 CAPSULE ORAL ONCE
Refills: 0 | Status: COMPLETED | OUTPATIENT
Start: 2023-10-21 | End: 2023-10-21

## 2023-10-21 RX ORDER — ONDANSETRON 8 MG/1
8 TABLET, FILM COATED ORAL ONCE
Refills: 0 | Status: DISCONTINUED | OUTPATIENT
Start: 2023-10-21 | End: 2023-10-28

## 2023-10-21 RX ORDER — LIDOCAINE 4 G/100G
1 CREAM TOPICAL ONCE
Refills: 0 | Status: COMPLETED | OUTPATIENT
Start: 2023-10-21 | End: 2023-10-21

## 2023-10-21 RX ORDER — ACETAMINOPHEN 500 MG
1000 TABLET ORAL ONCE
Refills: 0 | Status: COMPLETED | OUTPATIENT
Start: 2023-10-21 | End: 2023-10-21

## 2023-10-21 RX ADMIN — Medication 600 MILLIGRAM(S): at 13:33

## 2023-10-21 RX ADMIN — Medication 50 MILLIGRAM(S): at 05:22

## 2023-10-21 RX ADMIN — ONDANSETRON 4 MILLIGRAM(S): 8 TABLET, FILM COATED ORAL at 03:00

## 2023-10-21 RX ADMIN — ONDANSETRON 4 MILLIGRAM(S): 8 TABLET, FILM COATED ORAL at 05:04

## 2023-10-21 RX ADMIN — Medication 10 MILLIGRAM(S): at 05:21

## 2023-10-21 RX ADMIN — LIDOCAINE 1 PATCH: 4 CREAM TOPICAL at 05:43

## 2023-10-21 RX ADMIN — SODIUM CHLORIDE 1000 MILLILITER(S): 9 INJECTION, SOLUTION INTRAVENOUS at 09:28

## 2023-10-21 RX ADMIN — ONDANSETRON 4 MILLIGRAM(S): 8 TABLET, FILM COATED ORAL at 08:30

## 2023-10-21 RX ADMIN — Medication 50 MILLIGRAM(S): at 03:00

## 2023-10-21 RX ADMIN — PANTOPRAZOLE SODIUM 40 MILLIGRAM(S): 20 TABLET, DELAYED RELEASE ORAL at 09:28

## 2023-10-21 RX ADMIN — Medication 400 MILLIGRAM(S): at 03:26

## 2023-10-21 RX ADMIN — SODIUM CHLORIDE 1000 MILLILITER(S): 9 INJECTION, SOLUTION INTRAVENOUS at 10:15

## 2023-10-21 RX ADMIN — Medication 10 MILLIGRAM(S): at 09:20

## 2023-10-21 RX ADMIN — ONDANSETRON 4 MILLIGRAM(S): 8 TABLET, FILM COATED ORAL at 13:33

## 2023-10-21 RX ADMIN — Medication 50 MILLIGRAM(S): at 09:28

## 2023-10-21 NOTE — ED CDU PROVIDER DISPOSITION NOTE - PATIENT PORTAL LINK FT
You can access the FollowMyHealth Patient Portal offered by [facility] by registering at the following website: [email or web address]. By joining [employer]’s FollowMyHealth portal, you will also be able to view your health information using other applications (apps) compatible with our system.

## 2023-10-21 NOTE — ED BEHAVIORAL HEALTH ASSESSMENT NOTE - HPI (INCLUDE ILLNESS QUALITY, SEVERITY, DURATION, TIMING, CONTEXT, MODIFYING FACTORS, ASSOCIATED SIGNS AND SYMPTOMS)
patient explains since September recurrent polysubstance abuse including alcohol - tequila- 1 pint daily, Xanax 4 mg daily, unspecified amounts of Ketamine, heroin / Fentanyl  attempting to detox himself at home but reports severe nausea, body aches, sweats He explains he contacted [facility] for recovery but his insurance lapsed until Nov 1  He denies active suicidal urges but admits to feeling hopeless and due to physical withdrawal symptoms "I want this to end" referring to withdrawal symptoms

## 2023-10-21 NOTE — ED ADULT NURSE REASSESSMENT NOTE - NS ED NURSE REASSESS COMMENT FT1
Assumed care 0700 received report from Charlie RN, pt a&ox4, VSS, respirations even and unlabored on room air, requesting Zofran made MD Aguilar aware, no distress noted.

## 2023-10-21 NOTE — ED CDU PROVIDER INITIAL DAY NOTE - OBJECTIVE STATEMENT
38 male history of polysubstance abuse presents reporting body aches and nausea.  Per patient and brother Slade ([phone]) at bedside, patient has been seeking outpatient detox, and due to insurance issues has not been able to receive help until November 1 when his insurance begins.  Throughout this time, patient reports using multiple substances to combat his symptoms of withdrawal from his Xanax and alcohol, including cocaine and ketamine (last use reportedly 2 grams today).  Patient reports last use of Xanax and alcohol yesterday morning.  States he drinks about a pint a day 4 mg of Xanax, however brother at bedside states patient uses much more. They are concerned that pt has been using other substances recently in an attempt to self harm. Pt reports passive feelings of SI worsening over the past few weeks. Denies HI, hallucinations.

## 2023-10-21 NOTE — SBIRT NOTE ADULT - NSSBIRTALCPASSREFTXDET_GEN_A_CORE
Patient reports he drinks a pint 3-4x a week. Pt reports he is open to Detox but does not have insurance until Nov 1st. SW provided pt list of facilities that provide substance use tx as well as those that can offer services for those uninsured.

## 2023-10-21 NOTE — ED ADULT NURSE REASSESSMENT NOTE - NS ED NURSE REASSESS COMMENT FT1
Pt complaining of nausea and abdominal pain, CIWAS score 7, Librium administered, respirations even and unlabored on room air, pt requested to speak to MD made MD Aguilar aware.

## 2023-10-21 NOTE — ED CDU PROVIDER DISPOSITION NOTE - CLINICAL COURSE
Patient presented for polysubstance abuse and withdrawal - requiring multiple rounds of medication for opioid, cocaine and ketamine withdrawal - no obvious alcohol withdrawal. patient does not require inpatient medical admission, no beds at [facility] - patient has no insurance - family agreeable to take patient to [facility] for possible detox bed.

## 2023-10-21 NOTE — ED CDU PROVIDER INITIAL DAY NOTE - PROGRESS NOTE DETAILS
Patient ate breakfast, now dry heaving. Will give reglan and reassess. I suspect symptoms are from cocaine and benzo withdrawal

## 2023-10-21 NOTE — ED ADULT NURSE NOTE - OBJECTIVE STATEMENT
Assumed care of pt at 2200 Pt A&Ox4 c/o ingestion of drugs and alcohol, when asked pt what drugs he stated "I don't know, ketamine." pt expresses desire for detox and looking to do so at facility. Family at bedside with pt. All belongings taken from pt and placed in ambulance triage. Resp even unlabored, abd soft but tender, endorses nausea and vomit but denies diarrhea. Yazan is a 3 at this time. MD at bedside.

## 2023-10-21 NOTE — CHART NOTE - NSCHARTNOTEFT_GEN_A_CORE
SW Note: SW was informed by MD that pt's family had expressed concerns for pt to get assistance for polysubstance use. SW was later informed by psych pt is cleared for Detox. SW met with pt at bedside - pt had to be repeatedly aroused to participate in conversation. Pt confirmed he is interested in Detox but that he does not have insurance until Nov 1st. SW completed SBIRT/DAST with pt. SW provided pt community resources as well as list of substance use tx facilities for uninsured. Pt was agreeable to pt reaching out to [facility]. SW reached out to [facility] - no beds for Detox or inpt sub use. SW reached out to [facility] reports they do not take phone referrals over the weekend. SW reached out to MD to inform them. MD shared family will be at bedside soon. SW confirmed they can speak to family. SW remains available as needed.

## 2023-10-21 NOTE — SBIRT NOTE ADULT - NSSBIRTDRGPASSREFTXDET_GEN_A_CORE
Pt reports to using ketamine and xanax. Pt reports he buys his Xanax from a friend who is prescribed. Pt open to Detox treatment but does not have insurance until Nov 1st. SW provided pt list of facilities that provide substance use tx as well as those that can offer services for those uninsured.

## 2023-10-21 NOTE — ED BEHAVIORAL HEALTH ASSESSMENT NOTE - DESCRIPTION
symptomatic relief of symptoms  Vital Signs Last 24 Hrs  T(C): 36.7 (21 Oct 2023 07:28), Max: 37.1 (21 Oct 2023 05:30)  T(F): 98.1 (21 Oct 2023 07:28), Max: 98.7 (21 Oct 2023 05:30)  HR: 68 (21 Oct 2023 07:28) (54 - 68)  BP: 135/80 (21 Oct 2023 07:28) (135/80 - 149/83)  BP(mean): --  RR: 20 (21 Oct 2023 07:28) (18 - 20)  SpO2: 99% (21 Oct 2023 07:28) (98% - 100%)    Parameters below as of 21 Oct 2023 05:30  Patient On (Oxygen Delivery Method): room air none unemployed

## 2023-10-21 NOTE — ED CDU PROVIDER DISPOSITION NOTE - NSFOLLOWUPINSTRUCTIONS_ED_ALL_ED_FT
- Go directly to [facility].  - Medication was sent to your pharmacy - take it as directed as needed.

## 2023-11-12 NOTE — ED PROVIDER NOTE - NSICDXPASTMEDICALHX_GEN_ALL_CORE_FT
SURGERY PROGRESS NOTE  11/12/2023 10:37 AM     SUBJECTIVE    NAEO   Tolerating sips of water  Pain well controlled    ROS otherwise negative for chest pain, SOB, headache, upper respiratory symptoms.      OBJECTIVE    Visit Vitals  BP 99/62 (BP Location: RUE - Right upper extremity, Patient Position: Supine)   Pulse 70   Temp 97.5 °F (36.4 °C) (Oral)   Resp 18   Ht 5' 5\" (1.651 m)   Wt 62.4 kg (137 lb 9.1 oz)   SpO2 96%   BMI 22.89 kg/m²       General: AOx4. No acute distress. Cooperative.  Neuro: Moving all extremities equally.   HEENT: Normocephalic. No scleral icterus. No rhinorrhea.   Cardiovascular: Extremities warm and pink. Peripheral pulses strong and equal.  Respiratory: Non labored breathing. Symmetric chest rise.   Gastrointestinal: Non-distended. Appropriately tender to palpation. Incisions clean, dry, intact. No rigidity, rebound, or guarding.    Skin: Warm. Dry. Intact.    Recent Labs   Lab 11/12/23  0611 11/11/23  0656 11/10/23  2111   WBC 10.8 13.9* 12.6*   RBC 4.17* 4.38* 4.99   HGB 12.6* 13.6 15.3   HCT 39.3 40.6 45.3    154 188     Recent Labs   Lab 11/12/23 0612 11/11/23  0656 11/10/23  2111   SODIUM 138 138 137   POTASSIUM 4.5 4.0 4.0   CHLORIDE 105 103 98   CO2 25 22 29   BUN 9 9 11   CREATININE 0.67 0.77 0.82   CALCIUM 8.0* 8.4 9.4   ALBUMIN  --   --  3.9   BILIRUBIN  --   --  1.1*   ALKPT  --   --  68   GPT  --   --  23   AST  --   --  32   GLUCOSE 109* 77 118*     Recent Labs   Lab 11/11/23 0656   PTT 31   PT 12.4*   INR 1.2       ASSESMENT AND PLAN    44M POD1 s/p laparoscopic appendectomy with abdominal washout for perforated appendicitis with feculent peritonitis.     -Pain control  -Advance to full liquid diet  -Monitor NICK drain output  -Remove Doran  -Continue antibiotics  -DVT ppx: Lovenox    Will discuss with attending.    Anoop Adames PGY4  Surgery      PAST MEDICAL HISTORY:  Colitis     ETOH abuse     Gastritis     Pancreatitis     Stomach ulcer

## 2023-12-20 NOTE — ED PROVIDER NOTE - NS ED ATTENDING STATEMENT MOD
[Home] : at home, [unfilled] , at the time of the visit. [Medical Office: (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  \par  \par    [de-identified] : Pts   in 2022.  She has been grieving, good family support.  Reviewed labs. Creatinine 1.5.   She has been suffering from back pain, still gets fatigue rather quickly but all stable since transplant.  Feels worse since cyclo increased to 75/75 and last trough 150 with increase in creatinine.   I have personally seen and examined this patient.  I have fully participated in the care of this patient. I have reviewed all pertinent clinical information, including history, physical exam, plan and the Resident’s note and agree except as noted.

## 2024-01-01 NOTE — ED ADULT TRIAGE NOTE - CCCP TRG CHIEF CMPLNT
Problem: PAIN -   Goal: Displays adequate comfort level or baseline comfort level  Description: INTERVENTIONS:  - Perform pain scoring using age-appropriate tool with hands-on care as needed.  Notify physician/AP of high pain scores not responsive to comfort measures  - Administer analgesics based on type and severity of pain and evaluate response  - Sucrose analgesia per protocol for brief minor painful procedures  - Teach parents interventions for comforting infant  Outcome: Progressing     Problem: THERMOREGULATION - PEDIATRICS  Goal: Maintains normal body temperature  Description: Interventions:  - Monitor temperature (axillary for Newborns) as ordered  - Monitor for signs of hypothermia or hyperthermia  - Provide thermal support measures  - Wean to open crib when appropriate  Outcome: Progressing     Problem: INFECTION -   Goal: No evidence of infection  Description: INTERVENTIONS:  - Instruct family/visitors to use good hand hygiene technique  - Identify and instruct in appropriate isolation precautions for identified infection/condition  - Change incubator every 2 weeks or as needed.  - Monitor for symptoms of infection  - Monitor surgical sites and insertion sites for all indwelling lines, tubes, and drains for drainage, redness, or edema.  - Monitor endotracheal and nasal secretions for changes in amount and color  - Monitor culture and CBC results  - Administer antibiotics as ordered.  Monitor drug levels  Outcome: Progressing     Problem: RISK FOR INFECTION (RISK FACTORS FOR MATERNAL CHORIOAMNIOITIS - )  Goal: No evidence of infection  Description: INTERVENTIONS:  - Instruct family/visitors to use good hand hygiene technique  - Monitor for symptoms of infection  - Monitor culture and CBC results  - Administer antibiotics as ordered.  Monitor drug levels  Outcome: Progressing     Problem: SAFETY -   Goal: Patient will remain free from falls  Description: INTERVENTIONS:  -  Instruct family/caregiver on patient safety  - Keep incubator doors and portholes closed when unattended  - Keep radiant warmer side rails and crib rails up when unattended  - Based on caregiver fall risk screen, instruct family/caregiver to ask for assistance with transferring infant if caregiver noted to have fall risk factors  Outcome: Progressing     Problem: Knowledge Deficit  Goal: Patient/family/caregiver demonstrates understanding of disease process, treatment plan, medications, and discharge instructions  Description: Complete learning assessment and assess knowledge base.  Interventions:  - Provide teaching at level of understanding  - Provide teaching via preferred learning methods  Outcome: Progressing  Goal: Infant caregiver verbalizes understanding of benefits of skin-to-skin with healthy   Description: Prior to delivery, educate patient regarding skin-to-skin practice and its benefits  Initiate immediate and uninterrupted skin-to-skin contact after birth until breastfeeding is initiated or a minimum of one hour  Encourage continued skin-to-skin contact throughout the post partum stay    Outcome: Progressing  Goal: Infant caregiver verbalizes understanding of benefits and management of breastfeeding their healthy   Description: Help initiate breastfeeding within one hour of birth  Educate/assist with breastfeeding positioning and latch  Educate on safe positioning and to monitor their  for safety  Educate on how to maintain lactation even if they are  from their   Educate/initiate pumping for a mom with a baby in the NICU within 6 hours after birth  Give infants no food or drink other than breast milk unless medically indicated  Educate on feeding cues and encourage breastfeeding on demand    Outcome: Progressing  Goal: Infant caregiver verbalizes understanding of benefits to rooming-in with their healthy   Description: Promote rooming in 23 out of 24 hours  per day  Educate on benefits to rooming-in  Provide  care in room with parents as long as infant and mother condition allow    Outcome: Progressing  Goal: Provide formula feeding instructions and preparation information to caregivers who do not wish to breastfeed their   Description: Provide one on one information on frequency, amount, and burping for formula feeding caregivers throughout their stay and at discharge.  Provide written information/video on formula preparation.    Outcome: Progressing  Goal: Infant caregiver verbalizes understanding of support and resources for follow up after discharge  Description: Provide individual discharge education on when to call the doctor.  Provide resources and contact information for post-discharge support.    Outcome: Progressing     Problem: DISCHARGE PLANNING  Goal: Discharge to home or other facility with appropriate resources  Description: INTERVENTIONS:  - Identify barriers to discharge w/patient and caregiver  - Arrange for needed discharge resources and transportation as appropriate  - Identify discharge learning needs (meds, wound care, etc.)  - Arrange for interpretive services to assist at discharge as needed  - Refer to Case Management Department for coordinating discharge planning if the patient needs post-hospital services based on physician/advanced practitioner order or complex needs related to functional status, cognitive ability, or social support system  Outcome: Progressing     Problem: Adequate NUTRIENT INTAKE -   Goal: Nutrient/Hydration intake appropriate for improving, restoring or maintaining nutritional needs  Description: INTERVENTIONS:  - Assess growth and nutritional status of patients and recommend course of action  - Monitor nutrient intake, labs, and treatment plans  - Recommend appropriate diets and vitamin/mineral supplements  - Monitor and recommend adjustments to tube feedings and TPN/PPN based on assessed needs  -  Provide specific nutrition education as appropriate  Outcome: Progressing  Goal: Breast feeding baby will demonstrate adequate intake  Description: Interventions:  - Monitor/record daily weights and I&O  - Monitor milk transfer  - Increase maternal fluid intake  - Increase breastfeeding frequency and duration  - Teach mother to massage breast before feeding/during infant pauses during feeding  - Pump breast after feeding  - Review breastfeeding discharge plan with mother. Refer to breast feeding support groups  - Initiate discussion/inform physician of weight loss and interventions taken  - Help mother initiate breast feeding within an hour of birth  - Encourage skin to skin time with  within 5 minutes of birth  - Give  no food or drink other than breast milk  - Encourage rooming in  - Encourage breast feeding on demand  - Initiate SLP consult as needed  Outcome: Progressing  Goal: Bottle fed baby will demonstrate adequate intake  Description: Interventions:  - Monitor/record daily weights and I&O  - Increase feeding frequency and volume  - Teach bottle feeding techniques to care provider/s  - Initiate discussion/inform physician of weight loss and interventions taken  - Initiate SLP consult as needed  Outcome: Progressing     Problem: NORMAL   Goal: Experiences normal transition  Description: INTERVENTIONS:  - Monitor vital signs  - Maintain thermoregulation  - Assess for hypoglycemia risk factors or signs and symptoms  - Assess for sepsis risk factors or signs and symptoms  - Assess for jaundice risk and/or signs and symptoms  Outcome: Progressing  Goal: Total weight loss less than 10% of birth weight  Description: INTERVENTIONS:  - Assess feeding patterns  - Weigh daily  Outcome: Progressing      abdominal pain

## 2024-05-15 NOTE — ED ADULT TRIAGE NOTE - CADM TRG TX PRIOR TO ARRIVAL
Detail Level: Zone Photo Preface (Leave Blank If You Do Not Want): Photographs were obtained today none

## 2024-05-31 NOTE — ED PROVIDER NOTE - NSTIMEPROVIDERCAREINITIATE_GEN_ER
OFFICE VISIT     Verbal permission granted by patient to leave a detailed message with medical information at phone number listed in Epic. yes    Patient is male    Patient is here today with Self    No chief complaint on file.    HISTORY OF PRESENT ILLNESS: The patient is a 56 year old  male here today for follow up of a wart on his right thumb. Last visit was 04/17/2024.      Lesion of concern located on the right thumb wart  Duration: ***  Changes: ***  Bleeding: ***  Painful: ***  Itching: ***       No other skin concerns; nothing on the skin that is bleeding, ulcerating, painful, growing or changing.     He has Crohn disease, has treated with Remicade for 20+ years.  Treated with 6-mercaptopurine in the past.     DERM-RELEVANT HISTORY:  History of skin cancer-  -SCCis right nasal sidewall, MMS Dr. Nicolas 2/4/21  -BCC left forehead, excision 11/2013    Hx of dysplastic nevi    History of actinic keratoses treated with cryotherapy    Family history of skin cancer--type unknown-- father   History of tanning bed use- no   Outdoor hobbies-in the sun very little, gardening and mowing lawn  Sun protective measures-SPF 50  Occupation-IT, also background in socialwork  Verbal consent obtained to examine genitals, buttocks yes.    PAST MEDICAL HISTORY:  []  None  []  Melanoma    []  Asthma [x]  Skin Cancer  []  Eczema []  Keloids  [x]  Allergies []  Psoriasis  []  Vitiligo        REVIEW OF SYSTEMS:  Yes No  []  [x]  Other skin concerns, rash, or other changing lesions  []  [x]  Fevers, current, or recent illness  []  [x]  Easy bruising or bleeding    I have reviewed the past medical history, family history, social history, medications and allergies listed in the medical record as obtained by my nursing staff and support staff and agree with their documentation      PHYSICAL EXAM:   There were no vitals filed for this visit.  GENERAL:  Pleasant, alert, no acute distress, well-groomed.    Skin: Examination of  areas noted was performed.     Findings include:   -brown stuck on papules and plaques face  -hyperkeratotic/verrucous papule right thumb    Findings include:  -***  -***  -***  -***  -***  -***  -***  -***        ASSESSMENT AND PLAN:   Inflamed seborrheic keratosis/es: As noted below. Discussed benign nature, provided reassurance. As irritated, treated with cryotherapy as detailed below.     Verruca:  As noted in exam. On Remicade. Discussed benign, persistent, viral nature. Discussed treatment options. Treated 1 lesion with cryotherapy after use of curette as detailed below.     Patient scheduled for total skin exam on 10/02/2024.     CRYOTHERAPY PROCEDURE NOTE     {Lesions:401401} Location *** .     Total number of lesions treated:  ***.    After verbal informed consent with discussion of wound formation, potential incomplete resolution of lesion(s), blistering, pain, and dyspigmentation, the lesions (as described above in the physical examination and assessment and plan) were treated individually with liquid nitrogen 1-2 times depending on size of lesion and for 5-10 seconds with each freeze as necessitated by the lesion.  Wound care instructions given.        On 5/31/2024, Dagmar VALLEJO RN scribed the services personally performed by MD GENEVIEVE Garcias, Naty Mock MD, attest that the documentation recorded by the scribe accurately and completely reflects the service(s) I personally performed and the decisions made by me. I also reviewed and verified the scribe's note, which I edited as appropriate.                     21-Dec-2020 02:07

## 2024-06-25 NOTE — ED PROVIDER NOTE - OBJECTIVE STATEMENT
From: Temi Palomares  To: Yemi Spears DO  Sent: 8/1/2021 10:31 PM EDT  Subject: Non-Urgent Medical Question    Please re-fill     Invokana Tabs  100 mg  90 qty  45-day supply    to [facility]    Thanks!     Cele Murphy
38 yo male with PMHx marijuana use, cyclic vomiting syndrome, pancreatitis who presents with abdominal pain. Patient states that he stopped drinking alcohol and smoking marijuana 1 month ago. Today he developed severe abdominal pain "all over" with accompanying nausea and vomiting.  Has chills from the pain but no fevers, diarrhea, or other complaints. He has not taken anything for his pain. States his pain feels similar to prior episodes of pancreatitis.
25-Jun-2024 07:40

## 2024-07-09 NOTE — ED PROVIDER NOTE - PROGRESS NOTE DETAILS
patient not suicidal or homicidal advised needs to discontinue his drug abuse refusing rehab likely sprain of lower back no high risk low back pain features including fever recent IV drug abuse recent lower back procedures no bowel or bladder incontinence or retention advised to take acetaminophen ibuprofen follow-up with PCP
No
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
## 2024-09-19 NOTE — ED ADULT NURSE NOTE - CAS TRG GEN SKIN CONDITION
Patient calling to see if the upcoming appointment on October 3rd could potentially be a phone appointment. Please advise.    Warm
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
## 2025-08-05 NOTE — ED ADULT TRIAGE NOTE - NS ED TRIAGE AVPU SCALE
Your discharge plan includes access to our free Care Transitions program.     As your Care Transition Nurse I will contact you via phone within 2 business days after your discharge from the hospital. Please have your discharge instructions and medications ready for review. Over the next 30 days I will call you at least once a week. I am able to assist with arranging follow-up appointments. I have direct contact with your physicians and will alert them with any health or medication concerns or relay your questions.     Your Care Transitions Nurse is KEVIN Henry    If you have any questions or concerns after your discharge and prior to our first call, you can reach me at [phone].    
Alert-The patient is alert, awake and responds to voice. The patient is oriented to time, place, and person. The triage nurse is able to obtain subjective information.